# Patient Record
Sex: MALE | Race: WHITE | NOT HISPANIC OR LATINO | Employment: OTHER | URBAN - METROPOLITAN AREA
[De-identification: names, ages, dates, MRNs, and addresses within clinical notes are randomized per-mention and may not be internally consistent; named-entity substitution may affect disease eponyms.]

---

## 2017-05-25 ENCOUNTER — HISTORICAL (OUTPATIENT)
Dept: ADMINISTRATIVE | Facility: HOSPITAL | Age: 42
End: 2017-05-25

## 2017-05-25 LAB
ALBUMIN SERPL-MCNC: 3.9 GM/DL (ref 3.4–5)
ALBUMIN/GLOB SERPL: 1 {RATIO}
ALP SERPL-CCNC: 59 UNIT/L (ref 20–120)
ALT SERPL-CCNC: 127 UNIT/L
APPEARANCE, UA: CLEAR
AST SERPL-CCNC: 83 UNIT/L
BACTERIA #/AREA URNS AUTO: ABNORMAL /[HPF]
BILIRUB SERPL-MCNC: 0.7 MG/DL
BILIRUB UR QL STRIP: NEGATIVE
BILIRUBIN DIRECT+TOT PNL SERPL-MCNC: <0.1 MG/DL
BILIRUBIN DIRECT+TOT PNL SERPL-MCNC: >0.6 MG/DL
BUN SERPL-MCNC: 13 MG/DL (ref 7–25)
CALCIUM SERPL-MCNC: 9 MG/DL (ref 8.4–10.3)
CHLORIDE SERPL-SCNC: 103 MMOL/L (ref 96–110)
CO2 SERPL-SCNC: 28 MMOL/L (ref 24–32)
COLOR UR: YELLOW
CREAT SERPL-MCNC: 0.96 MG/DL (ref 0.7–1.4)
CREAT UR-MCNC: 427.7 MG/DL
EST. AVERAGE GLUCOSE BLD GHB EST-MCNC: 217 MG/DL
GLOBULIN SER-MCNC: 3.8 GM/ML (ref 2.3–3.5)
GLUCOSE (UA): 300 MG/DL
GLUCOSE SERPL-MCNC: 230 MG/DL (ref 65–99)
HBA1C MFR BLD: 9.2 % (ref 4.7–5.6)
HGB UR QL STRIP: 0.03 MG/DL
HYALINE CASTS #/AREA URNS LPF: ABNORMAL /[LPF]
KETONES UR QL STRIP: ABNORMAL
LEUKOCYTE ESTERASE UR QL STRIP: NEGATIVE
MICROALBUMIN UR-MCNC: 22.1 MG/L (ref 0–19)
MICROALBUMIN/CREAT RATIO PNL UR: 5.2 MCG/MG CR (ref 0–29)
NITRITE UR QL STRIP: NEGATIVE
PH UR STRIP: 5.5 [PH] (ref 4.5–8)
POTASSIUM SERPL-SCNC: 4 MMOL/L (ref 3.6–5.2)
PROT SERPL-MCNC: 7.7 GM/DL (ref 6–8)
PROT UR QL STRIP: 50 MG/DL
RBC #/AREA URNS AUTO: ABNORMAL /[HPF]
SODIUM SERPL-SCNC: 137 MMOL/L (ref 135–146)
SP GR UR STRIP: 1.04 (ref 1–1.03)
SQUAMOUS #/AREA URNS LPF: ABNORMAL /[LPF]
UROBILINOGEN UR STRIP-ACNC: 2 MG/DL
WBC #/AREA URNS AUTO: ABNORMAL /HPF

## 2019-10-31 ENCOUNTER — HISTORICAL (OUTPATIENT)
Dept: ENDOSCOPY | Facility: HOSPITAL | Age: 44
End: 2019-10-31

## 2020-01-22 ENCOUNTER — HISTORICAL (OUTPATIENT)
Dept: INFUSION THERAPY | Facility: HOSPITAL | Age: 45
End: 2020-01-22

## 2020-02-05 ENCOUNTER — HISTORICAL (OUTPATIENT)
Dept: INFUSION THERAPY | Facility: HOSPITAL | Age: 45
End: 2020-02-05

## 2020-03-04 ENCOUNTER — HISTORICAL (OUTPATIENT)
Dept: INFUSION THERAPY | Facility: HOSPITAL | Age: 45
End: 2020-03-04

## 2020-04-29 ENCOUNTER — HISTORICAL (OUTPATIENT)
Dept: INFUSION THERAPY | Facility: HOSPITAL | Age: 45
End: 2020-04-29

## 2020-06-24 ENCOUNTER — HISTORICAL (OUTPATIENT)
Dept: INFUSION THERAPY | Facility: HOSPITAL | Age: 45
End: 2020-06-24

## 2020-07-22 ENCOUNTER — HISTORICAL (OUTPATIENT)
Dept: INFUSION THERAPY | Facility: HOSPITAL | Age: 45
End: 2020-07-22

## 2020-08-19 ENCOUNTER — HISTORICAL (OUTPATIENT)
Dept: INFUSION THERAPY | Facility: HOSPITAL | Age: 45
End: 2020-08-19

## 2020-08-25 ENCOUNTER — HISTORICAL (OUTPATIENT)
Dept: ADMINISTRATIVE | Facility: HOSPITAL | Age: 45
End: 2020-08-25

## 2020-08-31 ENCOUNTER — HISTORICAL (OUTPATIENT)
Dept: RADIOLOGY | Facility: HOSPITAL | Age: 45
End: 2020-08-31

## 2020-08-31 LAB
FINAL CULTURE: NORMAL
FINAL CULTURE: NORMAL

## 2020-09-16 ENCOUNTER — HISTORICAL (OUTPATIENT)
Dept: INFUSION THERAPY | Facility: HOSPITAL | Age: 45
End: 2020-09-16

## 2020-09-29 ENCOUNTER — HISTORICAL (OUTPATIENT)
Dept: ADMINISTRATIVE | Facility: HOSPITAL | Age: 45
End: 2020-09-29

## 2020-10-14 ENCOUNTER — HISTORICAL (OUTPATIENT)
Dept: INFUSION THERAPY | Facility: HOSPITAL | Age: 45
End: 2020-10-14

## 2020-11-12 ENCOUNTER — HISTORICAL (OUTPATIENT)
Dept: INFUSION THERAPY | Facility: HOSPITAL | Age: 45
End: 2020-11-12

## 2020-12-09 ENCOUNTER — HISTORICAL (OUTPATIENT)
Dept: INFUSION THERAPY | Facility: HOSPITAL | Age: 45
End: 2020-12-09

## 2021-01-06 ENCOUNTER — HISTORICAL (OUTPATIENT)
Dept: INFUSION THERAPY | Facility: HOSPITAL | Age: 46
End: 2021-01-06

## 2021-02-03 ENCOUNTER — HISTORICAL (OUTPATIENT)
Dept: CARDIOLOGY | Facility: HOSPITAL | Age: 46
End: 2021-02-03

## 2021-02-03 ENCOUNTER — HISTORICAL (OUTPATIENT)
Dept: INFUSION THERAPY | Facility: HOSPITAL | Age: 46
End: 2021-02-03

## 2021-03-03 ENCOUNTER — HISTORICAL (OUTPATIENT)
Dept: INFUSION THERAPY | Facility: HOSPITAL | Age: 46
End: 2021-03-03

## 2021-03-31 ENCOUNTER — HISTORICAL (OUTPATIENT)
Dept: INFUSION THERAPY | Facility: HOSPITAL | Age: 46
End: 2021-03-31

## 2021-04-29 ENCOUNTER — HISTORICAL (OUTPATIENT)
Dept: INFUSION THERAPY | Facility: HOSPITAL | Age: 46
End: 2021-04-29

## 2021-05-26 ENCOUNTER — HISTORICAL (OUTPATIENT)
Dept: INFUSION THERAPY | Facility: HOSPITAL | Age: 46
End: 2021-05-26

## 2021-06-23 ENCOUNTER — HISTORICAL (OUTPATIENT)
Dept: INFUSION THERAPY | Facility: HOSPITAL | Age: 46
End: 2021-06-23

## 2021-07-16 ENCOUNTER — HISTORICAL (OUTPATIENT)
Dept: CARDIOLOGY | Facility: HOSPITAL | Age: 46
End: 2021-07-16

## 2021-07-21 ENCOUNTER — HISTORICAL (OUTPATIENT)
Dept: INFUSION THERAPY | Facility: HOSPITAL | Age: 46
End: 2021-07-21

## 2021-08-19 ENCOUNTER — HISTORICAL (OUTPATIENT)
Dept: ADMINISTRATIVE | Facility: HOSPITAL | Age: 46
End: 2021-08-19

## 2021-08-20 ENCOUNTER — HISTORICAL (OUTPATIENT)
Dept: INFUSION THERAPY | Facility: HOSPITAL | Age: 46
End: 2021-08-20

## 2021-08-21 LAB — FINAL CULTURE: NORMAL

## 2021-09-15 ENCOUNTER — HISTORICAL (OUTPATIENT)
Dept: INFUSION THERAPY | Facility: HOSPITAL | Age: 46
End: 2021-09-15

## 2021-10-13 ENCOUNTER — HISTORICAL (OUTPATIENT)
Dept: INFUSION THERAPY | Facility: HOSPITAL | Age: 46
End: 2021-10-13

## 2021-11-09 ENCOUNTER — HISTORICAL (OUTPATIENT)
Dept: INFUSION THERAPY | Facility: HOSPITAL | Age: 46
End: 2021-11-09

## 2021-12-08 ENCOUNTER — HISTORICAL (OUTPATIENT)
Dept: INFUSION THERAPY | Facility: HOSPITAL | Age: 46
End: 2021-12-08

## 2022-01-06 ENCOUNTER — HISTORICAL (OUTPATIENT)
Dept: INFUSION THERAPY | Facility: HOSPITAL | Age: 47
End: 2022-01-06

## 2022-02-07 ENCOUNTER — HISTORICAL (OUTPATIENT)
Dept: INFUSION THERAPY | Facility: HOSPITAL | Age: 47
End: 2022-02-07

## 2022-03-07 ENCOUNTER — HISTORICAL (OUTPATIENT)
Dept: INFUSION THERAPY | Facility: HOSPITAL | Age: 47
End: 2022-03-07

## 2022-03-15 ENCOUNTER — HISTORICAL (OUTPATIENT)
Dept: INFUSION THERAPY | Facility: HOSPITAL | Age: 47
End: 2022-03-15

## 2022-04-06 ENCOUNTER — HISTORICAL (OUTPATIENT)
Dept: ADMINISTRATIVE | Facility: HOSPITAL | Age: 47
End: 2022-04-06

## 2022-04-09 ENCOUNTER — HISTORICAL (OUTPATIENT)
Dept: ADMINISTRATIVE | Facility: HOSPITAL | Age: 47
End: 2022-04-09
Payer: MEDICARE

## 2022-04-11 ENCOUNTER — HISTORICAL (OUTPATIENT)
Dept: INFUSION THERAPY | Facility: HOSPITAL | Age: 47
End: 2022-04-11

## 2022-04-21 ENCOUNTER — HISTORICAL (OUTPATIENT)
Dept: ADMINISTRATIVE | Facility: HOSPITAL | Age: 47
End: 2022-04-21
Payer: MEDICARE

## 2022-04-25 VITALS
WEIGHT: 270.75 LBS | BODY MASS INDEX: 37.9 KG/M2 | SYSTOLIC BLOOD PRESSURE: 145 MMHG | HEIGHT: 71 IN | DIASTOLIC BLOOD PRESSURE: 83 MMHG | OXYGEN SATURATION: 97 %

## 2022-05-02 NOTE — HISTORICAL [facility] CERNER
This is a historical note converted from [employer]. Formatting and pictures may have been removed.  Please reference [employer] for original formatting and attached multimedia. Chief Complaint  check up - rx refills  History of Present Illness  41 year old  male here today for F/U. DM last A1c not at goal 8.4 previously at goal pt was walking alot then stopped. HTN at goal. Ulcerative colitis pt seen in gastro. Pt has no complaints today.  Review of Systems  All Systems Negative Except: See HPI  Physical Exam  Vitals & Measurements  T:?37? ?C ?(Oral)? HR:?69?(Peripheral)? RR:?18? BP:?127/83? HT:?177?cm? HT:?177?cm? HT:?177?cm? WT:?119?kg? WT:?119?kg? WT:?119?kg? BMI:?37.98?  General:? Alert and oriented, No acute distress.?  Eye:? Pupils are equal, round and reactive to light, Normal conjunctiva.?  HENT:? Normocephalic, Normal hearing, Oral mucosa is moist, No pharyngeal erythema.?  Neck:? Supple, Non-tender, No carotid bruit, No jugular venous distention, No lymphadenopathy, No thyromegaly.?  Respiratory:? Lungs are clear to auscultation, Respirations are non-labored, Breath sounds are equal, Symmetrical chest wall expansion.?  Cardiovascular:? Normal rate, Regular rhythm, No murmur, Good pulses equal in all extremities, Normal peripheral perfusion, No edema.?  Gastrointestinal:? Soft, Non-tender, Non-distended, Normal bowel sounds, No organomegaly.?Colostomy bag  Musculoskeletal: Normal range of motion, Normal strength, No tenderness, No deformity, Normal gait.?  Integumentary:? Warm, Dry.?  Neurologic:? Alert, Oriented.?  Psychiatric:? Cooperative, Appropriate mood & affect.?  ?  ?  Assessment/Plan  Alopecia areata  Seen in Dermatology  Ordered:  Clinic Follow up, *Est. 08/25/17 3:00:00 CDT, F/U 3 month, Order for future visit, Alopecia areata  Diabetes  Hypertension  Ulcerative colitis, [facility]  ?  Diabetes  A1c today  Last labs 8.4  Continue meds  Ordered:  Clinic Follow up, *Est. 08/25/17 3:00:00  CDT, F/U 3 month, Order for future visit, Alopecia areata  Diabetes  Hypertension  Ulcerative colitis, [facility]  Comprehensive Metabolic Panel, Routine collect, *Est. 05/25/17 3:00:00 CDT, Blood, Order for future visit, *Est. Stop date 05/25/17 3:00:00 CDT, Lab Collect, Diabetes, On Exactly, 05/25/17 7:39:00 CDT  Hemoglobin A1C [facility], Routine collect, *Est. 05/25/17 3:00:00 CDT, Blood, Order for future visit, *Est. Stop date 05/25/17 3:00:00 CDT, Lab Collect, Diabetes, On Exactly, 05/25/17 7:39:00 CDT  Microalbumin Level Urine, Routine collect, Urine, Order for future visit, *Est. 05/25/17 3:00:00 CDT, *Est. Stop date 05/25/17 3:00:00 CDT, Nurse collect, Diabetes, On Exactly, 05/25/17 7:39:00 CDT  Urinalysis with Microscopic if Indicated, Routine collect, Urine, Order for future visit, *Est. 05/25/17 3:00:00 CDT, *Est. Stop date 05/25/17 3:00:00 CDT, Nurse collect, Diabetes, On Exactly, 05/25/17 7:39:00 CDT  ?  Hypertension  ?At goal  ?Low sodium diet  Ordered:  Clinic Follow up, *Est. 08/25/17 3:00:00 CDT, F/U 3 month, Order for future visit, Alopecia areata  Diabetes  Hypertension  Ulcerative colitis, [facility]  ?  Ulcerative colitis  ?Keep F/U with gatro  Ordered:  Clinic Follow up, *Est. 08/25/17 3:00:00 CDT, F/U 3 month, Order for future visit, Alopecia areata  Diabetes  Hypertension  Ulcerative colitis, [facility]  ?   Problem List/Past Medical History  Colitis, ulcerative  Depression  Diabetes mellitus  Hypertension  Ileostomy - stoma  Obesity(  Probable Diagnosis  )  Ulcerative colitis  Historical  No historical problems  Procedure/Surgical History  Esophagogastroduodenoscopy (08/21/2015), Other endoscopy of small intestine (08/21/2015), Small intestinal endoscopy, enteroscopy beyond second portion of duodenum, including ileum; diagnostic, with or without collection of specimen(s) by brushing or washing (separate procedure) (08/21/2015), Colon resection, iliostomy, removal of large  colon.  Medications  Bentyl 20 mg oral tablet, 20 mg, 1 tab(s), Oral, QID, PRN, 3 refills  fluticasone 50 mcg/inh nasal spray, 2 spray(s), Nasal, BID, 5 refills  FLUTICASONE PROP 0.05% CREAM  glipiZIDE 10 mg oral tablet, 10 mg, 1 tab(s), Oral, Daily, 3 refills  lancets and strips for glucomter, See Instructions, 3 refills  Lidocaine Viscous 2% mucous membrane solution, 15 mL, N/A, Once  lisinopril 20 mg oral tablet, 20 mg, 1 tab(s), Oral, Daily, 3 refills  metFORMIN 1000 mg oral tablet, 1000 mg, 1 tab(s), Oral, BID, 3 refills  mometasone 0.1% topical ointment, 1 nat, TOP, Daily, 4 refills  multivitamin with minerals (Adult Tab), 1 tab(s), Oral, Daily  Pantoprazole 40 mg ORAL EC-Tablet, 40 mg, 1 tab(s), Oral, Daily, 3 refills  Vitamin B Complex oral tablet, 1 tab(s), Oral, Daily  Zantac 150 oral tablet, 150 mg, 1 tab(s), Oral, Once a day (at bedtime), 3 refills  Allergies  Bee Stings  No Known Medication Allergies  Social History  Alcohol  Past  Employment/School  Disabled, Highest education level: High school.  Exercise - Does not exercise  Home/Environment  Lives with Children, Spouse. Living situation: Home/Independent. Home equipment: Glucose monitoring. Alcohol abuse in household: No. Substance abuse in household: No. Smoker in household: No. Injuries/Abuse/Neglect in household: No. Feels unsafe at home: No. Safe place to go: Yes. Family/Friends available for support: Yes. Concern for family members at home: No. Major illness in household: No. Uatsdin restrictions/concerns: Blood products. Risks in environment: Pets/Animal exposure, Pool/[place]ake, Smoke/CO detectors absent.  Nutrition/Health  Type of diet: LOW RESIDUE. Diabetic, Caffeine intake amount: 1 TO 2 CUPS OF COFFEE 12 TO 20 OZ OF SODA A DAY. Wants to lose weight: Yes. Sleeping concerns: Yes. Feels highly stressed: Yes.  Substance Abuse  Never  Tobacco  Never smoker  Family History  Breast cancer: Grandmother.  Diabetes mellitus type 2: Mother and  Brother.  Heart disease: Mother.  Hyperlipidemia.: Mother.  Hypertension.: Sister and Brother.

## 2022-05-03 DIAGNOSIS — E11.9 TYPE 2 DIABETES MELLITUS WITHOUT COMPLICATION, UNSPECIFIED WHETHER LONG TERM INSULIN USE: Primary | ICD-10-CM

## 2022-05-04 DIAGNOSIS — R79.89 LOW TESTOSTERONE: Primary | ICD-10-CM

## 2022-05-04 RX ORDER — TESTOSTERONE CYPIONATE 200 MG/ML
200 INJECTION, SOLUTION INTRAMUSCULAR
Qty: 2 ML | Refills: 0 | Status: SHIPPED | OUTPATIENT
Start: 2022-05-04 | End: 2022-06-01

## 2022-05-06 DIAGNOSIS — K50.919 CROHN'S DISEASE WITH COMPLICATION, UNSPECIFIED GASTROINTESTINAL TRACT LOCATION: ICD-10-CM

## 2022-05-06 PROBLEM — K50.90 CROHN DISEASE: Status: ACTIVE | Noted: 2022-05-06

## 2022-05-06 RX ORDER — EPINEPHRINE 1 MG/ML
0.3 INJECTION, SOLUTION, CONCENTRATE INTRAVENOUS
Status: CANCELLED | OUTPATIENT
Start: 2022-05-09

## 2022-05-06 RX ORDER — DIPHENHYDRAMINE HYDROCHLORIDE 50 MG/ML
25 INJECTION INTRAMUSCULAR; INTRAVENOUS
Status: CANCELLED | OUTPATIENT
Start: 2022-05-09

## 2022-05-06 RX ORDER — IPRATROPIUM BROMIDE AND ALBUTEROL SULFATE 2.5; .5 MG/3ML; MG/3ML
3 SOLUTION RESPIRATORY (INHALATION)
Status: CANCELLED | OUTPATIENT
Start: 2022-05-09

## 2022-05-06 RX ORDER — METHYLPREDNISOLONE SOD SUCC 125 MG
40 VIAL (EA) INJECTION
Status: CANCELLED | OUTPATIENT
Start: 2022-05-09

## 2022-05-06 RX ORDER — HEPARIN 100 UNIT/ML
500 SYRINGE INTRAVENOUS
Status: CANCELLED | OUTPATIENT
Start: 2022-05-09

## 2022-05-06 RX ORDER — ACETAMINOPHEN 325 MG/1
650 TABLET ORAL
Status: CANCELLED | OUTPATIENT
Start: 2022-05-09

## 2022-05-06 RX ORDER — SODIUM CHLORIDE 0.9 % (FLUSH) 0.9 %
10 SYRINGE (ML) INJECTION
Status: CANCELLED | OUTPATIENT
Start: 2022-05-09

## 2022-05-09 ENCOUNTER — INFUSION (OUTPATIENT)
Dept: INFUSION THERAPY | Facility: HOSPITAL | Age: 47
End: 2022-05-09
Attending: STUDENT IN AN ORGANIZED HEALTH CARE EDUCATION/TRAINING PROGRAM
Payer: MEDICARE

## 2022-05-09 VITALS
DIASTOLIC BLOOD PRESSURE: 80 MMHG | BODY MASS INDEX: 41.31 KG/M2 | SYSTOLIC BLOOD PRESSURE: 145 MMHG | WEIGHT: 288.56 LBS | TEMPERATURE: 98 F | HEART RATE: 93 BPM | HEIGHT: 70 IN

## 2022-05-09 DIAGNOSIS — K50.919 CROHN'S DISEASE WITH COMPLICATION, UNSPECIFIED GASTROINTESTINAL TRACT LOCATION: Primary | ICD-10-CM

## 2022-05-09 PROCEDURE — 63600175 PHARM REV CODE 636 W HCPCS: Mod: JG | Performed by: INTERNAL MEDICINE

## 2022-05-09 PROCEDURE — 96365 THER/PROPH/DIAG IV INF INIT: CPT

## 2022-05-09 PROCEDURE — 96360 HYDRATION IV INFUSION INIT: CPT

## 2022-05-09 PROCEDURE — 25000003 PHARM REV CODE 250: Performed by: INTERNAL MEDICINE

## 2022-05-09 RX ORDER — SODIUM CHLORIDE 0.9 % (FLUSH) 0.9 %
10 SYRINGE (ML) INJECTION
Status: CANCELLED | OUTPATIENT
Start: 2022-06-06

## 2022-05-09 RX ORDER — METHYLPREDNISOLONE SOD SUCC 125 MG
40 VIAL (EA) INJECTION
Status: CANCELLED | OUTPATIENT
Start: 2022-06-06

## 2022-05-09 RX ORDER — EPINEPHRINE 1 MG/ML
0.3 INJECTION, SOLUTION, CONCENTRATE INTRAVENOUS
Status: CANCELLED | OUTPATIENT
Start: 2022-06-06

## 2022-05-09 RX ORDER — ACETAMINOPHEN 325 MG/1
650 TABLET ORAL
Status: CANCELLED | OUTPATIENT
Start: 2022-06-06

## 2022-05-09 RX ORDER — EPINEPHRINE 1 MG/ML
0.3 INJECTION, SOLUTION, CONCENTRATE INTRAVENOUS
Status: ACTIVE | OUTPATIENT
Start: 2022-05-09 | End: 2022-05-09

## 2022-05-09 RX ORDER — DIPHENHYDRAMINE HYDROCHLORIDE 50 MG/ML
25 INJECTION INTRAMUSCULAR; INTRAVENOUS
Status: CANCELLED | OUTPATIENT
Start: 2022-06-06

## 2022-05-09 RX ORDER — ACETAMINOPHEN 325 MG/1
650 TABLET ORAL
Status: ACTIVE | OUTPATIENT
Start: 2022-05-09 | End: 2022-05-09

## 2022-05-09 RX ORDER — SODIUM CHLORIDE 0.9 % (FLUSH) 0.9 %
10 SYRINGE (ML) INJECTION
Status: DISCONTINUED | OUTPATIENT
Start: 2022-05-09 | End: 2022-05-09 | Stop reason: HOSPADM

## 2022-05-09 RX ORDER — HEPARIN 100 UNIT/ML
500 SYRINGE INTRAVENOUS
Status: DISCONTINUED | OUTPATIENT
Start: 2022-05-09 | End: 2022-05-09 | Stop reason: HOSPADM

## 2022-05-09 RX ORDER — DIPHENHYDRAMINE HYDROCHLORIDE 50 MG/ML
25 INJECTION INTRAMUSCULAR; INTRAVENOUS
Status: ACTIVE | OUTPATIENT
Start: 2022-05-09 | End: 2022-05-09

## 2022-05-09 RX ORDER — IPRATROPIUM BROMIDE AND ALBUTEROL SULFATE 2.5; .5 MG/3ML; MG/3ML
3 SOLUTION RESPIRATORY (INHALATION)
Status: ACTIVE | OUTPATIENT
Start: 2022-05-09 | End: 2022-05-09

## 2022-05-09 RX ORDER — HEPARIN 100 UNIT/ML
500 SYRINGE INTRAVENOUS
Status: CANCELLED | OUTPATIENT
Start: 2022-06-06

## 2022-05-09 RX ORDER — IPRATROPIUM BROMIDE AND ALBUTEROL SULFATE 2.5; .5 MG/3ML; MG/3ML
3 SOLUTION RESPIRATORY (INHALATION)
Status: CANCELLED | OUTPATIENT
Start: 2022-06-06

## 2022-05-09 RX ADMIN — VEDOLIZUMAB 300 MG: 300 INJECTION, POWDER, LYOPHILIZED, FOR SOLUTION INTRAVENOUS at 01:05

## 2022-05-24 ENCOUNTER — NUTRITION (OUTPATIENT)
Dept: DIABETES | Facility: CLINIC | Age: 47
End: 2022-05-24
Payer: MEDICARE

## 2022-05-24 DIAGNOSIS — E11.9 TYPE 2 DIABETES MELLITUS WITHOUT COMPLICATION, UNSPECIFIED WHETHER LONG TERM INSULIN USE: ICD-10-CM

## 2022-05-24 PROCEDURE — G0108 DIAB MANAGE TRN  PER INDIV: HCPCS | Mod: PBBFAC | Performed by: DIETITIAN, REGISTERED

## 2022-05-24 PROCEDURE — 99212 OFFICE O/P EST SF 10 MIN: CPT | Mod: PBBFAC | Performed by: DIETITIAN, REGISTERED

## 2022-05-24 NOTE — PROGRESS NOTES
Diabetes Care Specialist Progress Note  Author: Trina Orellana RD  Date: 5/24/2022    Program Intake  Reason for Diabetes Program Visit:: Initial Diabetes Assessment  Current diabetes risk level::  (A1C unavailable)    Lab Results   Component Value Date    HGBA1C 9.2 (H) 05/25/2017       Clinical         Problem Review  Active comorbidities affecting diabetes self-care.: yes  Comorbidities: Other (comment) (Chron's disease)  Reviewed health maintenance: no (see comments) (reviewed in initial visit)    Clinical Assessment  Current Diabetes Treatment: Oral Medication, Injectable  Have you ever experienced hypoglycemia (low blood sugar)?: yes  In the last month, how often have you experienced low blood sugar?: once every other week  Are you able to tell when your blood sugar is low?: Yes  What symptoms do you experience?: Dizzy/Light-headed, Sweaty, Shaky  How do you treat hypoglycemia (low blood sugar)?: 4 glucose tablets  Have you ever experienced hyperglycemia (high blood sugar)?: yes  In the last month, how often have you experienced high blood sugar?: once every other week  Are you able to tell when your blood sugar is high?: Yes  What are your symptoms?: blurry vision    Medication Information  How do you obtain your medications?: Patient drives  How many days a week do you miss your medications?: Never  Do you sometimes have difficulty refilling your medications?: No  Medication adherence impacting ability to self-manage diabetes?: No    Labs  Do you have regular lab work to monitor your medications?: Yes  Type of Regular Lab Work: CBC  Lab Compliance Barriers: No    Nutritional Status  Diet: Diabetic diet  Meal Plan 24 Hour Recall: Breakfast, Lunch, Dinner  Meal Plan 24 Hour Recall - Breakfast: coffee with cream and sugar or 1/2 cup juice  Meal Plan 24 Hour Recall - Lunch: slim fast or glucerna  Meal Plan 24 Hour Recall - Dinner: meat sauce w/out spaghetti  Change in appetite?: Yes  Recent Changes in Weight:  Weight Loss (UBW - 266# in December 2021)  Was weight loss intentional or unintentional?: Unintentional  Current nutritional status an area of need that is impacting patient's ability to self-manage diabetes?: No    Additional Social History    Support  Does anyone support you with your diabetes care?: yes  Who supports you?: son/daughter, friend, spouse  Who takes you to your medical appointments?: self  Does the current support meet the patient's needs?: Yes  Is Support an area impacting ability to self-manage diabetes?: No    Access to Mass Media & Technology  Does the patient have access to any of the following devices or technologies?: Internet Access, Smart phone  Media or technology needs impacting ability to self-manage diabetes?: No    Cognitive/Behavioral Health  Alert and Oriented: Yes  Difficulty Thinking: No  Requires Prompting: No  Requires assistance for routine expression?: No  Cognitive or behavioral barriers impacting ability to self-manage diabetes?: No    Culture/Oriental orthodox  Culture or Baptist beliefs that may impact ability to access healthcare: No    Communication  Language preference: English  Hearing Problems: No  Vision Problems: Yes  Vision problem type:: Decreased Vision  Vision Assistance: Glasses    Health Literacy  Preferred Learning Method: Face to Face, Reading Materials, Hands On, Demonstration  How often do you need to have someone help you read instructions, pamphlets, or written material from your doctor or pharmacy?: Never  Health literacy needs impacting ability to self-manage diabetes?: No      Diabetes Self-Management Skills Assessment    Diabetes Disease Process/Treatment Options  Patient/caregiver able to state what happens when someone has diabetes.: yes  Patient/caregiver knows what type of diabetes they have.: yes  Diabetes Type : Type II  Patient/caregiver able to identify at least three signs and symptoms of diabetes.: yes  Identified signs and symptoms:: blurred  vision, frequent urination, fatigue  Diabetes Disease Process/Treatment Options: Skills Assessment Completed: No (completed on initial visit)  Area of need?: No    Nutrition/Healthy Eating  Challenges to healthy eating:: other (see comments) (binges on occassion)  Patient can identify foods that impact blood sugar.: yes  Patient-identified foods:: fruit/fruit juice, starchy vegetables (corn, peas, beans), starches (bread, pasta, rice, cereal), soda, sweets  Nutrition/Healthy Eating Skills Assessment Completed:: Yes  Assessment indicates:: Adequate understanding  Area of need?: No    Physical Activity/Exercise  Patient's daily activity level:: lightly active  Patient formally exercises outside of work.: no  Reasons for not exercising:: physically unable to exercise currently  Physical Activity/Exercise Skills Assessment Completed: : Yes  Assessment indicates:: Adequate understanding  Area of need?: No    Medications  Patient is able to describe current diabetes management routine.: yes  Diabetes management routine:: diet, injectable medications, oral medications  Patient is able to identify current diabetes medications, dosages, and appropriate timing of medications.: yes (Trulicity weekly, Janumet BID)  Patient understands the purpose of the medications taken for diabetes.: yes  Patient reports problems or concerns with current medication regimen.: no  Medication Skills Assessment Completed:: Yes  Assessment indicates:: Adequate understanding  Area of need?: No    Home Blood Glucose Monitoring  Patient states that blood sugar is checked at home daily.: no  Reasons for not monitoring:: finger pain  Home Blood Glucose Monitoring Skills Assessment Completed: : Yes  Assessment indicates:: Adequate understanding  Area of need?: No    Acute Complications  Patient is able to identify types of acute complications: Yes  Patient Identified:: Hypoglycemia  Patient is able to state the basic meaning of hypoglycemia?: Yes  Able  to state the blood sugar range for hypoglycemia?: yes  Patient stated range:: <70  Patient can identify general symptoms of hypoglycemia: yes  Patient identified:: dizziness, sweating  Able to state proper treatment of hypoglycemia?: yes  Patient identified:: 4 glucose tablets  Acute Complications Skills Assessment Completed: : Yes  Assessment indicates:: Adequate understanding  Area of need?: No    Chronic Complications  Chronic Complications Skills Assessment Completed: : No (completed at initial visit)  Area of need?: No    Psychosocial/Coping  Patient can identify ways of coping with chronic disease.: yes  Patient-stated ways of coping with chronic disease:: support from loved ones  Psychosocial/Coping Skills Assessment Completed: : Yes  Assessment indicates:: Adequate understanding  Area of need?: No      Diabetes Self Support Plan         Assessment Summary and Plan    Based on today's diabetes care assessment, the following areas of need were identified:      Social 5/24/2022   Support No   Access to Mass Media/Tech No   Cognitive/Behavioral Health No   Culture/[place] No   Health Literacy No        Clinical 5/24/2022   Medication Adherence No   Lab Compliance No   Nutritional Status No        Diabetes Self-Management Skills 5/24/2022   Diabetes Disease Process/Treatment Options No   Nutrition/Healthy Eating No   Physical Activity/Exercise No   Medication No   Home Blood Glucose Monitoring No   Acute Complications No   Chronic Complications No   Psychosocial/Coping No          Today's interventions were provided through individual discussion, instruction, and written materials were provided.      Patient verbalized understanding of instruction and written materials.  Pt was able to return back demonstration of instructions today. Patient understood key points, needs reinforcement and further instruction.     Diabetes Self-Management Care Plan:    Today's Diabetes Self-Management Care Plan was developed with  Sheila's input. Sheila has agreed to work toward the following goal(s) to improve his/her overall diabetes control.      There are no recently modified care plans to display for this patient.      Follow Up Plan     Follow up if symptoms worsen or fail to improve. Pt declines follow-up with diabetes ed and will reach out to diabetes education, as needed.    Today's care plan and follow up schedule was discussed with patient.  Sheila verbalized understanding of the care plan, goals, and agrees to follow up plan.        The patient was encouraged to communicate with his/her health care provider/physician and care team regarding his/her condition(s) and treatment.  I provided the patient with my contact information today and encouraged to contact me via phone or [facility] Patient Portal as needed.     Length of Visit   Total Time: 30 Minutes

## 2022-06-06 ENCOUNTER — INFUSION (OUTPATIENT)
Dept: INFUSION THERAPY | Facility: HOSPITAL | Age: 47
End: 2022-06-06
Attending: INTERNAL MEDICINE
Payer: MEDICARE

## 2022-06-06 VITALS — DIASTOLIC BLOOD PRESSURE: 76 MMHG | HEART RATE: 85 BPM | SYSTOLIC BLOOD PRESSURE: 123 MMHG | TEMPERATURE: 99 F

## 2022-06-06 DIAGNOSIS — K50.919 CROHN'S DISEASE WITH COMPLICATION, UNSPECIFIED GASTROINTESTINAL TRACT LOCATION: Primary | ICD-10-CM

## 2022-06-06 PROCEDURE — 96365 THER/PROPH/DIAG IV INF INIT: CPT

## 2022-06-06 PROCEDURE — 63600175 PHARM REV CODE 636 W HCPCS: Mod: JG | Performed by: INTERNAL MEDICINE

## 2022-06-06 PROCEDURE — 25000003 PHARM REV CODE 250: Performed by: INTERNAL MEDICINE

## 2022-06-06 RX ORDER — IPRATROPIUM BROMIDE AND ALBUTEROL SULFATE 2.5; .5 MG/3ML; MG/3ML
3 SOLUTION RESPIRATORY (INHALATION)
Status: DISCONTINUED | OUTPATIENT
Start: 2022-06-06 | End: 2022-06-06

## 2022-06-06 RX ORDER — HEPARIN 100 UNIT/ML
500 SYRINGE INTRAVENOUS
Status: CANCELLED | OUTPATIENT
Start: 2022-07-04

## 2022-06-06 RX ORDER — SODIUM CHLORIDE 0.9 % (FLUSH) 0.9 %
10 SYRINGE (ML) INJECTION
Status: CANCELLED | OUTPATIENT
Start: 2022-07-04

## 2022-06-06 RX ORDER — EPINEPHRINE 0.3 MG/.3ML
0.3 INJECTION SUBCUTANEOUS
Status: ACTIVE | OUTPATIENT
Start: 2022-06-06 | End: 2022-06-06

## 2022-06-06 RX ORDER — METHYLPREDNISOLONE SOD SUCC 125 MG
40 VIAL (EA) INJECTION
Status: CANCELLED | OUTPATIENT
Start: 2022-07-04

## 2022-06-06 RX ORDER — ACETAMINOPHEN 325 MG/1
650 TABLET ORAL
Status: CANCELLED | OUTPATIENT
Start: 2022-07-04

## 2022-06-06 RX ORDER — ACETAMINOPHEN 325 MG/1
650 TABLET ORAL
Status: ACTIVE | OUTPATIENT
Start: 2022-06-06 | End: 2022-06-06

## 2022-06-06 RX ORDER — EPINEPHRINE 1 MG/ML
0.3 INJECTION, SOLUTION, CONCENTRATE INTRAVENOUS
Status: CANCELLED | OUTPATIENT
Start: 2022-07-04

## 2022-06-06 RX ORDER — IPRATROPIUM BROMIDE AND ALBUTEROL SULFATE 2.5; .5 MG/3ML; MG/3ML
3 SOLUTION RESPIRATORY (INHALATION)
Status: CANCELLED | OUTPATIENT
Start: 2022-07-04

## 2022-06-06 RX ORDER — DIPHENHYDRAMINE HYDROCHLORIDE 50 MG/ML
25 INJECTION INTRAMUSCULAR; INTRAVENOUS
Status: CANCELLED | OUTPATIENT
Start: 2022-07-04

## 2022-06-06 RX ORDER — DIPHENHYDRAMINE HYDROCHLORIDE 50 MG/ML
25 INJECTION INTRAMUSCULAR; INTRAVENOUS
Status: ACTIVE | OUTPATIENT
Start: 2022-06-06 | End: 2022-06-06

## 2022-06-06 RX ADMIN — VEDOLIZUMAB 300 MG: 300 INJECTION, POWDER, LYOPHILIZED, FOR SOLUTION INTRAVENOUS at 12:06

## 2022-06-08 ENCOUNTER — TELEPHONE (OUTPATIENT)
Dept: FAMILY MEDICINE | Facility: CLINIC | Age: 47
End: 2022-06-08

## 2022-06-13 RX ORDER — DULAGLUTIDE 0.75 MG/.5ML
INJECTION, SOLUTION SUBCUTANEOUS
COMMUNITY
Start: 2022-06-04 | End: 2022-06-13 | Stop reason: SDUPTHER

## 2022-06-13 RX ORDER — TESTOSTERONE CYPIONATE 200 MG/ML
100 INJECTION, SOLUTION INTRAMUSCULAR
COMMUNITY
End: 2022-06-13 | Stop reason: SDUPTHER

## 2022-06-13 RX ORDER — TESTOSTERONE CYPIONATE 200 MG/ML
100 INJECTION, SOLUTION INTRAMUSCULAR
Qty: 1 ML | Refills: 3 | OUTPATIENT
Start: 2022-06-13

## 2022-06-13 RX ORDER — DULAGLUTIDE 0.75 MG/.5ML
0.75 INJECTION, SOLUTION SUBCUTANEOUS
Qty: 4 PEN | Refills: 3 | Status: SHIPPED | OUTPATIENT
Start: 2022-06-13 | End: 2022-11-09

## 2022-06-14 RX ORDER — TESTOSTERONE CYPIONATE 200 MG/ML
100 INJECTION, SOLUTION INTRAMUSCULAR
Qty: 4 ML | Refills: 4 | Status: SHIPPED | OUTPATIENT
Start: 2022-06-14 | End: 2022-06-20 | Stop reason: SDUPTHER

## 2022-06-16 ENCOUNTER — TELEPHONE (OUTPATIENT)
Dept: FAMILY MEDICINE | Facility: CLINIC | Age: 47
End: 2022-06-16
Payer: MEDICARE

## 2022-06-16 NOTE — TELEPHONE ENCOUNTER
"Spoke with [facility]" will resend for refill Testosterone Cypionate .  "

## 2022-06-20 DIAGNOSIS — R79.89 LOW TESTOSTERONE: Primary | ICD-10-CM

## 2022-06-20 RX ORDER — TESTOSTERONE CYPIONATE 200 MG/ML
100 INJECTION, SOLUTION INTRAMUSCULAR
Qty: 4 ML | Refills: 4 | Status: SHIPPED | OUTPATIENT
Start: 2022-06-20 | End: 2022-06-30 | Stop reason: SDUPTHER

## 2022-06-22 ENCOUNTER — TELEPHONE (OUTPATIENT)
Dept: FAMILY MEDICINE | Facility: CLINIC | Age: 47
End: 2022-06-22

## 2022-06-29 DIAGNOSIS — Z79.4 TYPE 2 DIABETES MELLITUS WITHOUT COMPLICATION, WITH LONG-TERM CURRENT USE OF INSULIN: Primary | ICD-10-CM

## 2022-06-29 DIAGNOSIS — E11.9 TYPE 2 DIABETES MELLITUS WITHOUT COMPLICATION, WITH LONG-TERM CURRENT USE OF INSULIN: Primary | ICD-10-CM

## 2022-06-29 RX ORDER — DULAGLUTIDE 0.75 MG/.5ML
0.75 INJECTION, SOLUTION SUBCUTANEOUS
Qty: 12 PEN | Refills: 3 | Status: SHIPPED | OUTPATIENT
Start: 2022-06-29 | End: 2022-11-09

## 2022-06-30 ENCOUNTER — TELEPHONE (OUTPATIENT)
Dept: FAMILY MEDICINE | Facility: CLINIC | Age: 47
End: 2022-06-30

## 2022-06-30 DIAGNOSIS — R79.89 LOW TESTOSTERONE: ICD-10-CM

## 2022-06-30 RX ORDER — TESTOSTERONE CYPIONATE 200 MG/ML
100 INJECTION, SOLUTION INTRAMUSCULAR
Qty: 4 ML | Refills: 4 | Status: SHIPPED | OUTPATIENT
Start: 2022-06-30 | End: 2023-09-29

## 2022-07-25 ENCOUNTER — OFFICE VISIT (OUTPATIENT)
Dept: FAMILY MEDICINE | Facility: CLINIC | Age: 47
End: 2022-07-25
Payer: MEDICARE

## 2022-07-25 VITALS
WEIGHT: 219.38 LBS | HEART RATE: 93 BPM | TEMPERATURE: 98 F | DIASTOLIC BLOOD PRESSURE: 76 MMHG | SYSTOLIC BLOOD PRESSURE: 109 MMHG | BODY MASS INDEX: 31.47 KG/M2 | OXYGEN SATURATION: 100 %

## 2022-07-25 DIAGNOSIS — Z79.4 TYPE 2 DIABETES MELLITUS WITHOUT COMPLICATION, WITH LONG-TERM CURRENT USE OF INSULIN: ICD-10-CM

## 2022-07-25 DIAGNOSIS — R79.89 LOW TESTOSTERONE: Primary | ICD-10-CM

## 2022-07-25 DIAGNOSIS — E11.9 TYPE 2 DIABETES MELLITUS WITHOUT COMPLICATION, WITHOUT LONG-TERM CURRENT USE OF INSULIN: ICD-10-CM

## 2022-07-25 DIAGNOSIS — E03.4 HYPOTHYROIDISM DUE TO ACQUIRED ATROPHY OF THYROID: ICD-10-CM

## 2022-07-25 DIAGNOSIS — E11.9 TYPE 2 DIABETES MELLITUS WITHOUT COMPLICATION, WITH LONG-TERM CURRENT USE OF INSULIN: ICD-10-CM

## 2022-07-25 DIAGNOSIS — E03.9 HYPOTHYROIDISM, UNSPECIFIED TYPE: ICD-10-CM

## 2022-07-25 LAB
EST. AVERAGE GLUCOSE BLD GHB EST-MCNC: 125.5 MG/DL
HBA1C MFR BLD: 6 %
T4 FREE SERPL-MCNC: 1.16 NG/DL (ref 0.7–1.48)
TSH SERPL-ACNC: 2.82 UIU/ML (ref 0.35–4.94)

## 2022-07-25 PROCEDURE — 36415 COLL VENOUS BLD VENIPUNCTURE: CPT | Performed by: STUDENT IN AN ORGANIZED HEALTH CARE EDUCATION/TRAINING PROGRAM

## 2022-07-25 PROCEDURE — 1159F PR MEDICATION LIST DOCUMENTED IN MEDICAL RECORD: ICD-10-PCS | Mod: CPTII,,, | Performed by: STUDENT IN AN ORGANIZED HEALTH CARE EDUCATION/TRAINING PROGRAM

## 2022-07-25 PROCEDURE — 3078F DIAST BP <80 MM HG: CPT | Mod: CPTII,,, | Performed by: STUDENT IN AN ORGANIZED HEALTH CARE EDUCATION/TRAINING PROGRAM

## 2022-07-25 PROCEDURE — 99214 OFFICE O/P EST MOD 30 MIN: CPT | Mod: S$PBB,,, | Performed by: STUDENT IN AN ORGANIZED HEALTH CARE EDUCATION/TRAINING PROGRAM

## 2022-07-25 PROCEDURE — 3008F BODY MASS INDEX DOCD: CPT | Mod: CPTII,,, | Performed by: STUDENT IN AN ORGANIZED HEALTH CARE EDUCATION/TRAINING PROGRAM

## 2022-07-25 PROCEDURE — 3074F PR MOST RECENT SYSTOLIC BLOOD PRESSURE < 130 MM HG: ICD-10-PCS | Mod: CPTII,,, | Performed by: STUDENT IN AN ORGANIZED HEALTH CARE EDUCATION/TRAINING PROGRAM

## 2022-07-25 PROCEDURE — 1159F MED LIST DOCD IN RCRD: CPT | Mod: CPTII,,, | Performed by: STUDENT IN AN ORGANIZED HEALTH CARE EDUCATION/TRAINING PROGRAM

## 2022-07-25 PROCEDURE — 99214 PR OFFICE/OUTPT VISIT, EST, LEVL IV, 30-39 MIN: ICD-10-PCS | Mod: S$PBB,,, | Performed by: STUDENT IN AN ORGANIZED HEALTH CARE EDUCATION/TRAINING PROGRAM

## 2022-07-25 PROCEDURE — 3008F PR BODY MASS INDEX (BMI) DOCUMENTED: ICD-10-PCS | Mod: CPTII,,, | Performed by: STUDENT IN AN ORGANIZED HEALTH CARE EDUCATION/TRAINING PROGRAM

## 2022-07-25 PROCEDURE — 3078F PR MOST RECENT DIASTOLIC BLOOD PRESSURE < 80 MM HG: ICD-10-PCS | Mod: CPTII,,, | Performed by: STUDENT IN AN ORGANIZED HEALTH CARE EDUCATION/TRAINING PROGRAM

## 2022-07-25 PROCEDURE — 84439 ASSAY OF FREE THYROXINE: CPT | Performed by: STUDENT IN AN ORGANIZED HEALTH CARE EDUCATION/TRAINING PROGRAM

## 2022-07-25 PROCEDURE — 83036 HEMOGLOBIN GLYCOSYLATED A1C: CPT | Performed by: STUDENT IN AN ORGANIZED HEALTH CARE EDUCATION/TRAINING PROGRAM

## 2022-07-25 PROCEDURE — 99214 OFFICE O/P EST MOD 30 MIN: CPT | Mod: PBBFAC,PN | Performed by: STUDENT IN AN ORGANIZED HEALTH CARE EDUCATION/TRAINING PROGRAM

## 2022-07-25 PROCEDURE — 3074F SYST BP LT 130 MM HG: CPT | Mod: CPTII,,, | Performed by: STUDENT IN AN ORGANIZED HEALTH CARE EDUCATION/TRAINING PROGRAM

## 2022-07-25 PROCEDURE — 84443 ASSAY THYROID STIM HORMONE: CPT | Performed by: STUDENT IN AN ORGANIZED HEALTH CARE EDUCATION/TRAINING PROGRAM

## 2022-07-25 PROCEDURE — 4010F PR ACE/ARB THEARPY RXD/TAKEN: ICD-10-PCS | Mod: CPTII,,, | Performed by: STUDENT IN AN ORGANIZED HEALTH CARE EDUCATION/TRAINING PROGRAM

## 2022-07-25 PROCEDURE — 4010F ACE/ARB THERAPY RXD/TAKEN: CPT | Mod: CPTII,,, | Performed by: STUDENT IN AN ORGANIZED HEALTH CARE EDUCATION/TRAINING PROGRAM

## 2022-07-25 RX ORDER — FUROSEMIDE 40 MG/1
40 TABLET ORAL DAILY
COMMUNITY
Start: 2022-02-17

## 2022-07-25 RX ORDER — ROSUVASTATIN CALCIUM 10 MG/1
10 TABLET, COATED ORAL
COMMUNITY
End: 2023-09-28 | Stop reason: SDUPTHER

## 2022-07-25 RX ORDER — CETIRIZINE HYDROCHLORIDE 10 MG/1
10 TABLET ORAL
COMMUNITY
Start: 2022-07-12 | End: 2023-01-08

## 2022-07-25 RX ORDER — AZELASTINE 1 MG/ML
SPRAY, METERED NASAL
COMMUNITY
Start: 2022-06-27

## 2022-07-25 RX ORDER — OXYCODONE AND ACETAMINOPHEN 10; 325 MG/1; MG/1
1 TABLET ORAL EVERY 4 HOURS PRN
COMMUNITY
Start: 2022-07-12

## 2022-07-25 RX ORDER — OXCARBAZEPINE 300 MG/1
300 TABLET, FILM COATED ORAL 2 TIMES DAILY
COMMUNITY
Start: 2022-07-10

## 2022-07-25 RX ORDER — ALBUTEROL SULFATE 90 UG/1
AEROSOL, METERED RESPIRATORY (INHALATION)
COMMUNITY
Start: 2022-07-10

## 2022-07-25 RX ORDER — HYDROCODONE BITARTRATE AND ACETAMINOPHEN 5; 325 MG/1; MG/1
1 TABLET ORAL EVERY 4 HOURS PRN
COMMUNITY
Start: 2022-07-01

## 2022-07-25 RX ORDER — METRONIDAZOLE 500 MG/1
500 TABLET ORAL
COMMUNITY
Start: 2022-07-23 | End: 2022-07-30

## 2022-07-25 RX ORDER — PROMETHAZINE HYDROCHLORIDE 25 MG/1
25 TABLET ORAL EVERY 6 HOURS PRN
COMMUNITY
Start: 2022-07-12

## 2022-07-25 RX ORDER — GLIPIZIDE 10 MG/1
10 TABLET ORAL
COMMUNITY
Start: 2021-07-16 | End: 2023-09-28

## 2022-07-25 RX ORDER — CLOPIDOGREL BISULFATE 75 MG/1
75 TABLET ORAL
COMMUNITY
Start: 2021-11-12

## 2022-07-25 RX ORDER — PANTOPRAZOLE SODIUM 40 MG/1
40 TABLET, DELAYED RELEASE ORAL
COMMUNITY
Start: 2021-11-12 | End: 2023-11-14 | Stop reason: SDUPTHER

## 2022-07-25 RX ORDER — ESCITALOPRAM OXALATE 10 MG/1
10 TABLET ORAL
COMMUNITY
Start: 2021-07-16

## 2022-07-25 RX ORDER — TESTOSTERONE CYPIONATE 200 MG/ML
100 INJECTION, SOLUTION INTRAMUSCULAR
COMMUNITY
End: 2022-10-21 | Stop reason: SDUPTHER

## 2022-07-25 RX ORDER — CIPROFLOXACIN 500 MG/1
500 TABLET ORAL
COMMUNITY
Start: 2022-07-23 | End: 2022-07-30

## 2022-07-25 RX ORDER — ONDANSETRON 4 MG/1
4 TABLET, ORALLY DISINTEGRATING ORAL EVERY 8 HOURS PRN
COMMUNITY
Start: 2022-07-12

## 2022-07-25 RX ORDER — LISINOPRIL 20 MG/1
20 TABLET ORAL
COMMUNITY
End: 2022-08-22 | Stop reason: SDUPTHER

## 2022-07-25 RX ORDER — DULAGLUTIDE 0.75 MG/.5ML
0.75 INJECTION, SOLUTION SUBCUTANEOUS
COMMUNITY
Start: 2022-06-13 | End: 2022-11-09

## 2022-07-25 RX ORDER — DICYCLOMINE HYDROCHLORIDE 20 MG/1
20 TABLET ORAL 3 TIMES DAILY
COMMUNITY
Start: 2022-04-02

## 2022-07-25 RX ORDER — LEVOTHYROXINE SODIUM 150 UG/1
150 TABLET ORAL
COMMUNITY
End: 2023-05-17

## 2022-07-25 RX ORDER — BUPROPION HYDROCHLORIDE 150 MG/1
150 TABLET, EXTENDED RELEASE ORAL 2 TIMES DAILY
COMMUNITY
Start: 2022-07-21

## 2022-07-25 RX ORDER — FLUTICASONE PROPIONATE 50 MCG
2 SPRAY, SUSPENSION (ML) NASAL
COMMUNITY

## 2022-07-25 RX ORDER — TRAZODONE HYDROCHLORIDE 100 MG/1
100 TABLET ORAL
COMMUNITY

## 2022-07-25 RX ORDER — POTASSIUM CHLORIDE 750 MG/1
20 CAPSULE, EXTENDED RELEASE ORAL
COMMUNITY

## 2022-07-25 RX ORDER — FLUCONAZOLE 150 MG/1
TABLET ORAL
COMMUNITY
Start: 2022-06-05

## 2022-07-25 RX ORDER — ARIPIPRAZOLE 10 MG/1
10 TABLET ORAL DAILY
COMMUNITY
Start: 2022-06-29

## 2022-07-25 NOTE — PROGRESS NOTES
Subjective:       Patient ID: Sheila Hamilton is a 46 y.o. male.    Chief Complaint: Follow-up    HPI   Patient was seen nurse practitioner Jalil moved away BLANCA has been signed  Patient is still receiving Testosterone injections at clinic in [place]. States that they are more convenient than for him to come here.   Patient lives in [place]  Interval history  Patient states he recently hospital stay secondary to bowel surgery and cholecystectomy    Crohn's disease  sees Dr. Trent has infusion every month at [facility] has an ileostomy  States he does not need any medical supplies at this time but his PCP was the one who ordered all of his ostomy supplies for him  Protonix 40 meq daily      Hypothyroidism  takes levothyroxine 125 mcg states he has an endocrinologist does not recall name and sees him once a year  TSH 4.168    diabetes  Reports last A1c 8.1 which was taken last week is on glipizide 10 mg twice daily  Janumet 50/1000 mg twice daily  States he was given prescription for insulin but cannot bring himself to use his needle  Is amenable to starting a weekly injection    Anxiety  Sees a psychiatrist who has prescribed diazepam 10 mg 3 times daily  Bupropion 150 mg twice daily  Trileptal 300 mg twice daily  Trazodone 50 mg at bedtime    Reactive airway disease  Uses albuterol as needed  Also uses Flonase twice daily for allergies    Hypertension  Is on lisinopril 20  States that this was recently changed however he does not know the name of the new medication until it comes in the mail  Also takes potassium chloride 20 mEq a day    History of stent placement in legs  sees [facility]  Takes clopidogrel 75 mg daily    HLD  Rosuvastatin 10 at bedtime    Low testosterone  testosterone 200 mg IN. Patient reports that he takes every week.  Long discussion as patient receiving these injections from provider in [place]. Does not manage just gives injections.  States that the drive here is too far. Has  been getting them weekly but reports missing several due to hospitalizations.     yeast infection  still present  around urethra and groin states previous physician has given him a Diflucan tablet and cream to put on area  Also reports having urologist that he sees and had recent cystoscope    HM  Will revisit once records arrive    Review of Systems      Objective:      Physical Exam    Assessment:       Problem List Items Addressed This Visit    None     Visit Diagnoses     Low testosterone    -  Primary    Relevant Orders    Testosterone    Hypothyroidism, unspecified type        Relevant Orders    T4, Free    TSH    Type 2 diabetes mellitus without complication, with long-term current use of insulin        Relevant Medications    glipiZIDE (GLUCOTROL) 10 MG tablet    SITagliptan-metformin (JANUMET) 50-1,000 mg per tablet    dulaglutide (TRULICITY) 0.75 mg/0.5 mL pen injector    Other Relevant Orders    Hemoglobin A1C    Testosterone          Plan:       Problem List Items Addressed This Visit        Endocrine    Type 2 diabetes mellitus without complication, without long-term current use of insulin    Overview     A1c today   Glipizide,  Janumet  Trulicity            Relevant Medications    glipiZIDE (GLUCOTROL) 10 MG tablet    SITagliptan-metformin (JANUMET) 50-1,000 mg per tablet    dulaglutide (TRULICITY) 0.75 mg/0.5 mL pen injector    Low testosterone - Primary    Overview     Testosterone level today            Relevant Orders    Testosterone    Hypothyroidism due to acquired atrophy of thyroid    Overview     Continue synthroid 150 mg  TSH and T4 today. Unclear why patient reported endocrinologist but does not see for diabetes             Other Visit Diagnoses     Hypothyroidism, unspecified type        Relevant Orders    T4, Free    TSH    Type 2 diabetes mellitus without complication, with long-term current use of insulin        Relevant Medications    glipiZIDE (GLUCOTROL) 10 MG tablet     SITagliptan-metformin (JANUMET) 50-1,000 mg per tablet    dulaglutide (TRULICITY) 0.75 mg/0.5 mL pen injector    Other Relevant Orders    Hemoglobin A1C    Testosterone      Follow up in about 3 months (around 10/25/2022) for Testosterone.

## 2022-08-11 ENCOUNTER — INFUSION (OUTPATIENT)
Dept: INFUSION THERAPY | Facility: HOSPITAL | Age: 47
End: 2022-08-11
Attending: INTERNAL MEDICINE
Payer: MEDICARE

## 2022-08-11 VITALS
OXYGEN SATURATION: 98 % | DIASTOLIC BLOOD PRESSURE: 82 MMHG | RESPIRATION RATE: 18 BRPM | TEMPERATURE: 99 F | HEART RATE: 84 BPM | SYSTOLIC BLOOD PRESSURE: 137 MMHG | WEIGHT: 216.69 LBS | BODY MASS INDEX: 31.09 KG/M2

## 2022-08-11 DIAGNOSIS — K50.919 CROHN'S DISEASE WITH COMPLICATION, UNSPECIFIED GASTROINTESTINAL TRACT LOCATION: Primary | ICD-10-CM

## 2022-08-11 PROCEDURE — 63600175 PHARM REV CODE 636 W HCPCS: Mod: JG | Performed by: INTERNAL MEDICINE

## 2022-08-11 PROCEDURE — 96365 THER/PROPH/DIAG IV INF INIT: CPT

## 2022-08-11 PROCEDURE — 25000003 PHARM REV CODE 250: Performed by: INTERNAL MEDICINE

## 2022-08-11 RX ORDER — EPINEPHRINE 1 MG/ML
0.3 INJECTION, SOLUTION, CONCENTRATE INTRAVENOUS
Status: CANCELLED | OUTPATIENT
Start: 2022-09-01

## 2022-08-11 RX ORDER — HEPARIN 100 UNIT/ML
500 SYRINGE INTRAVENOUS
Status: CANCELLED | OUTPATIENT
Start: 2022-09-01

## 2022-08-11 RX ORDER — SODIUM CHLORIDE 0.9 % (FLUSH) 0.9 %
10 SYRINGE (ML) INJECTION
Status: CANCELLED | OUTPATIENT
Start: 2022-09-01

## 2022-08-11 RX ORDER — EPINEPHRINE 0.3 MG/.3ML
0.3 INJECTION SUBCUTANEOUS
Status: ACTIVE | OUTPATIENT
Start: 2022-08-11 | End: 2022-08-11

## 2022-08-11 RX ORDER — HEPARIN 100 UNIT/ML
500 SYRINGE INTRAVENOUS
Status: DISCONTINUED | OUTPATIENT
Start: 2022-08-11 | End: 2022-08-11 | Stop reason: HOSPADM

## 2022-08-11 RX ORDER — METHYLPREDNISOLONE SOD SUCC 125 MG
40 VIAL (EA) INJECTION
Status: CANCELLED | OUTPATIENT
Start: 2022-09-01

## 2022-08-11 RX ORDER — IPRATROPIUM BROMIDE AND ALBUTEROL SULFATE 2.5; .5 MG/3ML; MG/3ML
3 SOLUTION RESPIRATORY (INHALATION)
Status: CANCELLED | OUTPATIENT
Start: 2022-09-01

## 2022-08-11 RX ORDER — SODIUM CHLORIDE 0.9 % (FLUSH) 0.9 %
10 SYRINGE (ML) INJECTION
Status: DISCONTINUED | OUTPATIENT
Start: 2022-08-11 | End: 2022-08-11 | Stop reason: HOSPADM

## 2022-08-11 RX ORDER — ACETAMINOPHEN 325 MG/1
650 TABLET ORAL
Status: ACTIVE | OUTPATIENT
Start: 2022-08-11 | End: 2022-08-11

## 2022-08-11 RX ORDER — IPRATROPIUM BROMIDE AND ALBUTEROL SULFATE 2.5; .5 MG/3ML; MG/3ML
3 SOLUTION RESPIRATORY (INHALATION)
Status: DISCONTINUED | OUTPATIENT
Start: 2022-08-11 | End: 2022-08-11

## 2022-08-11 RX ORDER — ACETAMINOPHEN 325 MG/1
650 TABLET ORAL
Status: CANCELLED | OUTPATIENT
Start: 2022-09-01

## 2022-08-11 RX ORDER — DIPHENHYDRAMINE HYDROCHLORIDE 50 MG/ML
25 INJECTION INTRAMUSCULAR; INTRAVENOUS
Status: ACTIVE | OUTPATIENT
Start: 2022-08-11 | End: 2022-08-11

## 2022-08-11 RX ORDER — METHYLPREDNISOLONE SOD SUCC 125 MG
40 VIAL (EA) INJECTION
Status: ACTIVE | OUTPATIENT
Start: 2022-08-11 | End: 2022-08-11

## 2022-08-11 RX ORDER — DIPHENHYDRAMINE HYDROCHLORIDE 50 MG/ML
25 INJECTION INTRAMUSCULAR; INTRAVENOUS
Status: CANCELLED | OUTPATIENT
Start: 2022-09-01

## 2022-08-11 RX ADMIN — VEDOLIZUMAB 300 MG: 300 INJECTION, POWDER, LYOPHILIZED, FOR SOLUTION INTRAVENOUS at 12:08

## 2022-08-22 RX ORDER — LISINOPRIL 20 MG/1
20 TABLET ORAL DAILY
Qty: 30 TABLET | Refills: 11 | Status: SHIPPED | OUTPATIENT
Start: 2022-08-22 | End: 2023-03-27

## 2022-09-19 ENCOUNTER — INFUSION (OUTPATIENT)
Dept: INFUSION THERAPY | Facility: HOSPITAL | Age: 47
End: 2022-09-19
Attending: INTERNAL MEDICINE
Payer: MEDICARE

## 2022-09-19 VITALS
SYSTOLIC BLOOD PRESSURE: 146 MMHG | OXYGEN SATURATION: 97 % | RESPIRATION RATE: 18 BRPM | DIASTOLIC BLOOD PRESSURE: 79 MMHG | HEART RATE: 79 BPM | TEMPERATURE: 98 F

## 2022-09-19 DIAGNOSIS — K50.919 CROHN'S DISEASE WITH COMPLICATION, UNSPECIFIED GASTROINTESTINAL TRACT LOCATION: Primary | ICD-10-CM

## 2022-09-19 PROCEDURE — 63600175 PHARM REV CODE 636 W HCPCS: Mod: JG | Performed by: INTERNAL MEDICINE

## 2022-09-19 PROCEDURE — 96365 THER/PROPH/DIAG IV INF INIT: CPT

## 2022-09-19 PROCEDURE — 25000003 PHARM REV CODE 250: Performed by: INTERNAL MEDICINE

## 2022-09-19 RX ORDER — HEPARIN 100 UNIT/ML
500 SYRINGE INTRAVENOUS
Status: DISCONTINUED | OUTPATIENT
Start: 2022-09-19 | End: 2022-09-19 | Stop reason: HOSPADM

## 2022-09-19 RX ORDER — EPINEPHRINE 1 MG/ML
0.3 INJECTION, SOLUTION, CONCENTRATE INTRAVENOUS
Status: CANCELLED | OUTPATIENT
Start: 2022-10-03

## 2022-09-19 RX ORDER — DIPHENHYDRAMINE HYDROCHLORIDE 50 MG/ML
25 INJECTION INTRAMUSCULAR; INTRAVENOUS
Status: ACTIVE | OUTPATIENT
Start: 2022-09-19 | End: 2022-09-19

## 2022-09-19 RX ORDER — METHYLPREDNISOLONE SOD SUCC 125 MG
40 VIAL (EA) INJECTION
Status: CANCELLED | OUTPATIENT
Start: 2022-10-03

## 2022-09-19 RX ORDER — SODIUM CHLORIDE 0.9 % (FLUSH) 0.9 %
10 SYRINGE (ML) INJECTION
Status: DISCONTINUED | OUTPATIENT
Start: 2022-09-19 | End: 2022-09-19 | Stop reason: HOSPADM

## 2022-09-19 RX ORDER — HEPARIN 100 UNIT/ML
500 SYRINGE INTRAVENOUS
Status: CANCELLED | OUTPATIENT
Start: 2022-10-03

## 2022-09-19 RX ORDER — METHYLPREDNISOLONE SOD SUCC 125 MG
40 VIAL (EA) INJECTION
Status: ACTIVE | OUTPATIENT
Start: 2022-09-19 | End: 2022-09-19

## 2022-09-19 RX ORDER — ACETAMINOPHEN 325 MG/1
650 TABLET ORAL
Status: ACTIVE | OUTPATIENT
Start: 2022-09-19 | End: 2022-09-19

## 2022-09-19 RX ORDER — ACETAMINOPHEN 325 MG/1
650 TABLET ORAL
Status: CANCELLED | OUTPATIENT
Start: 2022-10-03

## 2022-09-19 RX ORDER — SODIUM CHLORIDE 0.9 % (FLUSH) 0.9 %
10 SYRINGE (ML) INJECTION
Status: CANCELLED | OUTPATIENT
Start: 2022-10-03

## 2022-09-19 RX ORDER — DIPHENHYDRAMINE HYDROCHLORIDE 50 MG/ML
25 INJECTION INTRAMUSCULAR; INTRAVENOUS
Status: CANCELLED | OUTPATIENT
Start: 2022-10-03

## 2022-09-19 RX ORDER — EPINEPHRINE 1 MG/ML
0.3 INJECTION, SOLUTION, CONCENTRATE INTRAVENOUS
Status: ACTIVE | OUTPATIENT
Start: 2022-09-19 | End: 2022-09-19

## 2022-09-19 RX ADMIN — VEDOLIZUMAB 300 MG: 300 INJECTION, POWDER, LYOPHILIZED, FOR SOLUTION INTRAVENOUS at 10:09

## 2022-09-19 NOTE — PLAN OF CARE
Pt tolerated infusion well. Next appt reviewed with pt. Pt denied questions or further needs at the time of discharge.

## 2022-10-17 ENCOUNTER — INFUSION (OUTPATIENT)
Dept: INFUSION THERAPY | Facility: HOSPITAL | Age: 47
End: 2022-10-17
Attending: INTERNAL MEDICINE
Payer: MEDICARE

## 2022-10-17 VITALS
HEART RATE: 74 BPM | DIASTOLIC BLOOD PRESSURE: 66 MMHG | SYSTOLIC BLOOD PRESSURE: 138 MMHG | RESPIRATION RATE: 20 BRPM | OXYGEN SATURATION: 98 % | TEMPERATURE: 99 F

## 2022-10-17 DIAGNOSIS — K50.919 CROHN'S DISEASE WITH COMPLICATION, UNSPECIFIED GASTROINTESTINAL TRACT LOCATION: Primary | ICD-10-CM

## 2022-10-17 PROCEDURE — 63600175 PHARM REV CODE 636 W HCPCS: Mod: JG | Performed by: INTERNAL MEDICINE

## 2022-10-17 PROCEDURE — 96413 CHEMO IV INFUSION 1 HR: CPT

## 2022-10-17 PROCEDURE — 25000003 PHARM REV CODE 250: Performed by: INTERNAL MEDICINE

## 2022-10-17 RX ORDER — ACETAMINOPHEN 325 MG/1
650 TABLET ORAL
Status: ACTIVE | OUTPATIENT
Start: 2022-10-17 | End: 2022-10-17

## 2022-10-17 RX ORDER — EPINEPHRINE 1 MG/ML
0.3 INJECTION, SOLUTION, CONCENTRATE INTRAVENOUS
Status: DISPENSED | OUTPATIENT
Start: 2022-10-17 | End: 2022-10-17

## 2022-10-17 RX ORDER — HEPARIN 100 UNIT/ML
500 SYRINGE INTRAVENOUS
Status: DISCONTINUED | OUTPATIENT
Start: 2022-10-17 | End: 2022-10-17 | Stop reason: HOSPADM

## 2022-10-17 RX ORDER — DIPHENHYDRAMINE HYDROCHLORIDE 50 MG/ML
25 INJECTION INTRAMUSCULAR; INTRAVENOUS
Status: ACTIVE | OUTPATIENT
Start: 2022-10-17 | End: 2022-10-17

## 2022-10-17 RX ORDER — DIPHENHYDRAMINE HYDROCHLORIDE 50 MG/ML
25 INJECTION INTRAMUSCULAR; INTRAVENOUS
Status: CANCELLED | OUTPATIENT
Start: 2022-11-14

## 2022-10-17 RX ORDER — METHYLPREDNISOLONE SOD SUCC 125 MG
40 VIAL (EA) INJECTION
Status: ACTIVE | OUTPATIENT
Start: 2022-10-17 | End: 2022-10-17

## 2022-10-17 RX ORDER — HEPARIN 100 UNIT/ML
500 SYRINGE INTRAVENOUS
Status: CANCELLED | OUTPATIENT
Start: 2022-11-14

## 2022-10-17 RX ORDER — EPINEPHRINE 1 MG/ML
0.3 INJECTION, SOLUTION, CONCENTRATE INTRAVENOUS
Status: CANCELLED | OUTPATIENT
Start: 2022-11-14

## 2022-10-17 RX ORDER — SODIUM CHLORIDE 0.9 % (FLUSH) 0.9 %
10 SYRINGE (ML) INJECTION
Status: DISCONTINUED | OUTPATIENT
Start: 2022-10-17 | End: 2022-10-17 | Stop reason: HOSPADM

## 2022-10-17 RX ORDER — ACETAMINOPHEN 325 MG/1
650 TABLET ORAL
Status: CANCELLED | OUTPATIENT
Start: 2022-11-14

## 2022-10-17 RX ORDER — METHYLPREDNISOLONE SOD SUCC 125 MG
40 VIAL (EA) INJECTION
Status: CANCELLED | OUTPATIENT
Start: 2022-11-14

## 2022-10-17 RX ORDER — SODIUM CHLORIDE 0.9 % (FLUSH) 0.9 %
10 SYRINGE (ML) INJECTION
Status: CANCELLED | OUTPATIENT
Start: 2022-11-14

## 2022-10-17 RX ADMIN — SODIUM CHLORIDE: 9 INJECTION, SOLUTION INTRAVENOUS at 11:10

## 2022-10-17 RX ADMIN — VEDOLIZUMAB 300 MG: 300 INJECTION, POWDER, LYOPHILIZED, FOR SOLUTION INTRAVENOUS at 10:10

## 2022-10-21 DIAGNOSIS — R79.89 LOW TESTOSTERONE IN MALE: Primary | ICD-10-CM

## 2022-10-21 RX ORDER — TESTOSTERONE CYPIONATE 200 MG/ML
100 INJECTION, SOLUTION INTRAMUSCULAR
Qty: 5 ML | Refills: 2 | Status: SHIPPED | OUTPATIENT
Start: 2022-10-21 | End: 2022-10-21 | Stop reason: SDUPTHER

## 2022-10-21 RX ORDER — TESTOSTERONE CYPIONATE 200 MG/ML
100 INJECTION, SOLUTION INTRAMUSCULAR
Qty: 5 ML | Refills: 2 | Status: SHIPPED | OUTPATIENT
Start: 2022-10-21 | End: 2023-05-05 | Stop reason: SDUPTHER

## 2022-10-25 ENCOUNTER — OFFICE VISIT (OUTPATIENT)
Dept: FAMILY MEDICINE | Facility: CLINIC | Age: 47
End: 2022-10-25
Payer: MEDICARE

## 2022-10-25 VITALS
BODY MASS INDEX: 34.36 KG/M2 | HEIGHT: 70 IN | RESPIRATION RATE: 20 BRPM | WEIGHT: 240 LBS | TEMPERATURE: 99 F | OXYGEN SATURATION: 96 % | SYSTOLIC BLOOD PRESSURE: 127 MMHG | HEART RATE: 81 BPM | DIASTOLIC BLOOD PRESSURE: 86 MMHG

## 2022-10-25 DIAGNOSIS — E11.59 TYPE 2 DIABETES MELLITUS WITH OTHER CIRCULATORY COMPLICATION, WITHOUT LONG-TERM CURRENT USE OF INSULIN: ICD-10-CM

## 2022-10-25 DIAGNOSIS — E03.4 HYPOTHYROIDISM DUE TO ACQUIRED ATROPHY OF THYROID: ICD-10-CM

## 2022-10-25 DIAGNOSIS — R35.0 URINARY FREQUENCY: ICD-10-CM

## 2022-10-25 DIAGNOSIS — Z00.00 WELLNESS EXAMINATION: ICD-10-CM

## 2022-10-25 DIAGNOSIS — R79.89 LOW TESTOSTERONE: Primary | ICD-10-CM

## 2022-10-25 DIAGNOSIS — R79.9 ABNORMAL FINDING OF BLOOD CHEMISTRY, UNSPECIFIED: ICD-10-CM

## 2022-10-25 DIAGNOSIS — E03.9 HYPOTHYROIDISM, UNSPECIFIED TYPE: ICD-10-CM

## 2022-10-25 LAB
ALBUMIN SERPL-MCNC: 4.1 GM/DL (ref 3.5–5)
ALBUMIN/GLOB SERPL: 1.1 RATIO (ref 1.1–2)
ALP SERPL-CCNC: 62 UNIT/L (ref 40–150)
ALT SERPL-CCNC: 80 UNIT/L (ref 0–55)
AMORPH URATE CRY URNS QL MICRO: ABNORMAL /UL
APPEARANCE UR: ABNORMAL
AST SERPL-CCNC: 54 UNIT/L (ref 5–34)
BACTERIA #/AREA URNS AUTO: ABNORMAL /HPF
BASOPHILS # BLD AUTO: 0.07 X10(3)/MCL (ref 0–0.2)
BASOPHILS NFR BLD AUTO: 0.9 %
BILIRUB UR QL STRIP.AUTO: NEGATIVE MG/DL
BILIRUBIN DIRECT+TOT PNL SERPL-MCNC: 0.4 MG/DL
BUN SERPL-MCNC: 13.5 MG/DL (ref 8.9–20.6)
CALCIUM SERPL-MCNC: 9.3 MG/DL (ref 8.4–10.2)
CHLORIDE SERPL-SCNC: 106 MMOL/L (ref 98–107)
CHOLEST SERPL-MCNC: 124 MG/DL
CHOLEST/HDLC SERPL: 4 {RATIO} (ref 0–5)
CO2 SERPL-SCNC: 25 MMOL/L (ref 22–29)
COLOR UR AUTO: YELLOW
CREAT SERPL-MCNC: 1.02 MG/DL (ref 0.73–1.18)
EOSINOPHIL # BLD AUTO: 0.53 X10(3)/MCL (ref 0–0.9)
EOSINOPHIL NFR BLD AUTO: 6.9 %
ERYTHROCYTE [DISTWIDTH] IN BLOOD BY AUTOMATED COUNT: 15.5 % (ref 11.5–17)
EST. AVERAGE GLUCOSE BLD GHB EST-MCNC: 148.5 MG/DL
GFR SERPLBLD CREATININE-BSD FMLA CKD-EPI: >60 MLS/MIN/1.73/M2
GLOBULIN SER-MCNC: 3.6 GM/DL (ref 2.4–3.5)
GLUCOSE SERPL-MCNC: 89 MG/DL (ref 74–100)
GLUCOSE UR QL STRIP.AUTO: NEGATIVE MG/DL
HBA1C MFR BLD: 6.8 %
HCT VFR BLD AUTO: 38.3 % (ref 42–52)
HDLC SERPL-MCNC: 32 MG/DL (ref 35–60)
HGB BLD-MCNC: 12.1 GM/DL (ref 14–18)
HIV 1+2 AB+HIV1 P24 AG SERPL QL IA: NONREACTIVE
IMM GRANULOCYTES # BLD AUTO: 0.06 X10(3)/MCL (ref 0–0.04)
IMM GRANULOCYTES NFR BLD AUTO: 0.8 %
KETONES UR QL STRIP.AUTO: NEGATIVE MG/DL
LDLC SERPL CALC-MCNC: 57 MG/DL (ref 50–140)
LEUKOCYTE ESTERASE UR QL STRIP.AUTO: NEGATIVE UNIT/L
LYMPHOCYTES # BLD AUTO: 2.47 X10(3)/MCL (ref 0.6–4.6)
LYMPHOCYTES NFR BLD AUTO: 32.1 %
MCH RBC QN AUTO: 24.4 PG (ref 27–31)
MCHC RBC AUTO-ENTMCNC: 31.6 MG/DL (ref 33–36)
MCV RBC AUTO: 77.4 FL (ref 80–94)
MONOCYTES # BLD AUTO: 0.45 X10(3)/MCL (ref 0.1–1.3)
MONOCYTES NFR BLD AUTO: 5.9 %
MUCOUS THREADS URNS QL MICRO: ABNORMAL /LPF
NEUTROPHILS # BLD AUTO: 4.1 X10(3)/MCL (ref 2.1–9.2)
NEUTROPHILS NFR BLD AUTO: 53.4 %
NITRITE UR QL STRIP.AUTO: NEGATIVE
NRBC BLD AUTO-RTO: 0 %
PH UR STRIP.AUTO: 5.5 [PH]
PLATELET # BLD AUTO: 238 X10(3)/MCL (ref 130–400)
PMV BLD AUTO: 9.4 FL (ref 7.4–10.4)
POTASSIUM SERPL-SCNC: 4.8 MMOL/L (ref 3.5–5.1)
PROT SERPL-MCNC: 7.7 GM/DL (ref 6.4–8.3)
PROT UR QL STRIP.AUTO: NEGATIVE MG/DL
RBC # BLD AUTO: 4.95 X10(6)/MCL (ref 4.7–6.1)
RBC #/AREA URNS AUTO: ABNORMAL /HPF
RBC UR QL AUTO: NEGATIVE UNIT/L
SODIUM SERPL-SCNC: 140 MMOL/L (ref 136–145)
SP GR UR STRIP.AUTO: 1.02
SQUAMOUS #/AREA URNS LPF: ABNORMAL /HPF
T4 FREE SERPL-MCNC: 0.81 NG/DL (ref 0.7–1.48)
TRIGL SERPL-MCNC: 177 MG/DL (ref 34–140)
TSH SERPL-ACNC: 2.56 UIU/ML (ref 0.35–4.94)
UROBILINOGEN UR STRIP-ACNC: NORMAL MG/DL
VLDLC SERPL CALC-MCNC: 35 MG/DL
WBC # SPEC AUTO: 7.7 X10(3)/MCL (ref 4.5–11.5)
WBC #/AREA URNS AUTO: ABNORMAL /HPF

## 2022-10-25 PROCEDURE — 80061 LIPID PANEL: CPT | Performed by: STUDENT IN AN ORGANIZED HEALTH CARE EDUCATION/TRAINING PROGRAM

## 2022-10-25 PROCEDURE — 87389 HIV-1 AG W/HIV-1&-2 AB AG IA: CPT | Performed by: STUDENT IN AN ORGANIZED HEALTH CARE EDUCATION/TRAINING PROGRAM

## 2022-10-25 PROCEDURE — 36415 COLL VENOUS BLD VENIPUNCTURE: CPT | Performed by: STUDENT IN AN ORGANIZED HEALTH CARE EDUCATION/TRAINING PROGRAM

## 2022-10-25 PROCEDURE — 3074F SYST BP LT 130 MM HG: CPT | Mod: CPTII,,, | Performed by: STUDENT IN AN ORGANIZED HEALTH CARE EDUCATION/TRAINING PROGRAM

## 2022-10-25 PROCEDURE — 83036 HEMOGLOBIN GLYCOSYLATED A1C: CPT | Performed by: STUDENT IN AN ORGANIZED HEALTH CARE EDUCATION/TRAINING PROGRAM

## 2022-10-25 PROCEDURE — 3079F DIAST BP 80-89 MM HG: CPT | Mod: CPTII,,, | Performed by: STUDENT IN AN ORGANIZED HEALTH CARE EDUCATION/TRAINING PROGRAM

## 2022-10-25 PROCEDURE — 99214 PR OFFICE/OUTPT VISIT, EST, LEVL IV, 30-39 MIN: ICD-10-PCS | Mod: S$PBB,,, | Performed by: STUDENT IN AN ORGANIZED HEALTH CARE EDUCATION/TRAINING PROGRAM

## 2022-10-25 PROCEDURE — 99214 OFFICE O/P EST MOD 30 MIN: CPT | Mod: S$PBB,,, | Performed by: STUDENT IN AN ORGANIZED HEALTH CARE EDUCATION/TRAINING PROGRAM

## 2022-10-25 PROCEDURE — 85025 COMPLETE CBC W/AUTO DIFF WBC: CPT | Performed by: STUDENT IN AN ORGANIZED HEALTH CARE EDUCATION/TRAINING PROGRAM

## 2022-10-25 PROCEDURE — 3079F PR MOST RECENT DIASTOLIC BLOOD PRESSURE 80-89 MM HG: ICD-10-PCS | Mod: CPTII,,, | Performed by: STUDENT IN AN ORGANIZED HEALTH CARE EDUCATION/TRAINING PROGRAM

## 2022-10-25 PROCEDURE — 84443 ASSAY THYROID STIM HORMONE: CPT | Performed by: STUDENT IN AN ORGANIZED HEALTH CARE EDUCATION/TRAINING PROGRAM

## 2022-10-25 PROCEDURE — 1159F PR MEDICATION LIST DOCUMENTED IN MEDICAL RECORD: ICD-10-PCS | Mod: CPTII,,, | Performed by: STUDENT IN AN ORGANIZED HEALTH CARE EDUCATION/TRAINING PROGRAM

## 2022-10-25 PROCEDURE — 84439 ASSAY OF FREE THYROXINE: CPT | Performed by: STUDENT IN AN ORGANIZED HEALTH CARE EDUCATION/TRAINING PROGRAM

## 2022-10-25 PROCEDURE — 81001 URINALYSIS AUTO W/SCOPE: CPT | Performed by: STUDENT IN AN ORGANIZED HEALTH CARE EDUCATION/TRAINING PROGRAM

## 2022-10-25 PROCEDURE — 4010F PR ACE/ARB THEARPY RXD/TAKEN: ICD-10-PCS | Mod: CPTII,,, | Performed by: STUDENT IN AN ORGANIZED HEALTH CARE EDUCATION/TRAINING PROGRAM

## 2022-10-25 PROCEDURE — 3074F PR MOST RECENT SYSTOLIC BLOOD PRESSURE < 130 MM HG: ICD-10-PCS | Mod: CPTII,,, | Performed by: STUDENT IN AN ORGANIZED HEALTH CARE EDUCATION/TRAINING PROGRAM

## 2022-10-25 PROCEDURE — 4010F ACE/ARB THERAPY RXD/TAKEN: CPT | Mod: CPTII,,, | Performed by: STUDENT IN AN ORGANIZED HEALTH CARE EDUCATION/TRAINING PROGRAM

## 2022-10-25 PROCEDURE — 80053 COMPREHEN METABOLIC PANEL: CPT | Performed by: STUDENT IN AN ORGANIZED HEALTH CARE EDUCATION/TRAINING PROGRAM

## 2022-10-25 PROCEDURE — 1159F MED LIST DOCD IN RCRD: CPT | Mod: CPTII,,, | Performed by: STUDENT IN AN ORGANIZED HEALTH CARE EDUCATION/TRAINING PROGRAM

## 2022-10-25 PROCEDURE — 99215 OFFICE O/P EST HI 40 MIN: CPT | Mod: PBBFAC,PN | Performed by: STUDENT IN AN ORGANIZED HEALTH CARE EDUCATION/TRAINING PROGRAM

## 2022-10-25 RX ORDER — BUSPIRONE HYDROCHLORIDE 7.5 MG/1
7.5 TABLET ORAL 3 TIMES DAILY
COMMUNITY

## 2022-10-26 PROBLEM — R35.0 URINARY FREQUENCY: Status: ACTIVE | Noted: 2022-10-26

## 2022-10-26 NOTE — PROGRESS NOTES
Subjective:       Patient ID: Sheila Hamilton is a 46 y.o. male.    Chief Complaint: Follow-up    HPI   Patient was seen nurse practitioner Jalil moved away BLANCA has been signed  Patient is still receiving Testosterone injections at clinic in [place]. States that they are more convenient than for him to come here.   Patient lives in [place]  Interval history  Patient states he recently hospital stay secondary to bowel surgery and cholecystectomy    Crohn's disease  sees Dr. Trent has infusion every month at [facility] has an ileostomy  States he does not need any medical supplies at this time but his PCP was the one who ordered all of his ostomy supplies for him  Protonix 40 meq daily      Hypothyroidism  takes levothyroxine 125 mcg states he has an endocrinologist does not recall name. This visit states he does not have an endocrinologist    diabetes  Reports last A1c 8.1 which was taken last week is on glipizide 10 mg twice daily  Janumet 50/1000 mg twice daily  States he was given prescription for insulin has only been able to use Trulicty 0.75 mg weekly  Does not check blood sugar at home and has had urinary frequency    Anxiety  Sees a psychiatrist who has prescribed diazepam 10 mg 3 times daily  Bupropion 150 mg twice daily  Trileptal 300 mg twice daily  Trazodone 50 mg at bedtime  Bsupar 5 mg     Reactive airway disease  Uses albuterol as needed  Also uses Flonase twice daily for allergies    Hypertension  Is on lisinopril 20  States that this was recently changed however he does not know the name of the new medication until it comes in the mail  Also takes potassium chloride 20 mEq a day    History of stent placement in legs  sees [facility]  Takes clopidogrel 75 mg daily    HLD  Rosuvastatin 10 at bedtime    Low testosterone  testosterone 200 mg IN. Patient reports that he takes every week.  Long discussion as patient receiving these injections from provider in [place]. Does not manage just  gives injections.  States that the drive here is too far. Has been getting them weekly but reports missing several due to hospitalizations.         Will revisit once records arrive    Review of Systems      Objective:      Physical Exam  Constitutional:       General: He is not in acute distress.     Appearance: He is ill-appearing.   Cardiovascular:      Rate and Rhythm: Normal rate and regular rhythm.      Heart sounds: No murmur heard.  Abdominal:      Comments: colostomy   Musculoskeletal:         General: Normal range of motion.   Skin:     General: Skin is warm and dry.   Neurological:      General: No focal deficit present.      Mental Status: He is oriented to person, place, and time.   Psychiatric:         Mood and Affect: Mood normal.       Assessment:       Problem List Items Addressed This Visit          Renal/    Urinary frequency    Relevant Orders    Urinalysis (Completed)       Endocrine    Low testosterone - Primary    Relevant Orders    Testosterone    Hypothyroidism due to acquired atrophy of thyroid     Other Visit Diagnoses       Wellness examination        Relevant Orders    HIV 1/2 Ag/Ab (4th Gen) (Completed)    CBC Auto Differential (Completed)    Comprehensive Metabolic Panel (Completed)    Lipid Panel (Completed)    Hypothyroidism, unspecified type        Relevant Orders    T4, Free (Completed)    TSH (Completed)    Type 2 diabetes mellitus with other circulatory complication, without long-term current use of insulin        Relevant Orders    Hemoglobin A1C (Completed)    Body mass index (BMI) 30.0-30.9, adult        Relevant Orders    CBC Auto Differential (Completed)    Abnormal finding of blood chemistry, unspecified        Relevant Orders    Lipid Panel (Completed)            Plan:       Problem List Items Addressed This Visit          Renal/    Urinary frequency    Overview     UA today          Relevant Orders    Urinalysis (Completed)       Endocrine    Low testosterone -  Primary    Overview     Testosterone level today          Relevant Orders    Testosterone    Hypothyroidism due to acquired atrophy of thyroid    Overview     Continue synthroid 150 mg  TSH and T4 today. Unclear why patient reported endocrinologist          Other Visit Diagnoses       Wellness examination        Relevant Orders    HIV 1/2 Ag/Ab (4th Gen) (Completed)    CBC Auto Differential (Completed)    Comprehensive Metabolic Panel (Completed)    Lipid Panel (Completed)    Hypothyroidism, unspecified type        Relevant Orders    T4, Free (Completed)    TSH (Completed)    Type 2 diabetes mellitus with other circulatory complication, without long-term current use of insulin        Relevant Orders    Hemoglobin A1C (Completed)    Body mass index (BMI) 30.0-30.9, adult        Relevant Orders    CBC Auto Differential (Completed)    Abnormal finding of blood chemistry, unspecified        Relevant Orders    Lipid Panel (Completed)           Problem List Items Addressed This Visit          Renal/    Urinary frequency    Overview     UA today          Relevant Orders    Urinalysis (Completed)       Endocrine    Low testosterone - Primary    Overview     Testosterone level today          Relevant Orders    Testosterone    Hypothyroidism due to acquired atrophy of thyroid    Overview     Continue synthroid 150 mg  TSH and T4 today. Unclear why patient reported endocrinologist          Other Visit Diagnoses       Wellness examination        Relevant Orders    HIV 1/2 Ag/Ab (4th Gen) (Completed)    CBC Auto Differential (Completed)    Comprehensive Metabolic Panel (Completed)    Lipid Panel (Completed)    Hypothyroidism, unspecified type        Relevant Orders    T4, Free (Completed)    TSH (Completed)    Type 2 diabetes mellitus with other circulatory complication, without long-term current use of insulin        Relevant Orders    Hemoglobin A1C (Completed)    Body mass index (BMI) 30.0-30.9, adult        Relevant Orders     CBC Auto Differential (Completed)    Abnormal finding of blood chemistry, unspecified        Relevant Orders    Lipid Panel (Completed)        Follow up in about 3 months (around 1/25/2023) for Diabetes.

## 2022-11-09 ENCOUNTER — OFFICE VISIT (OUTPATIENT)
Dept: FAMILY MEDICINE | Facility: CLINIC | Age: 47
End: 2022-11-09
Payer: MEDICARE

## 2022-11-09 DIAGNOSIS — E03.4 HYPOTHYROIDISM DUE TO ACQUIRED ATROPHY OF THYROID: ICD-10-CM

## 2022-11-09 DIAGNOSIS — E11.59 TYPE 2 DIABETES MELLITUS WITH OTHER CIRCULATORY COMPLICATION, WITHOUT LONG-TERM CURRENT USE OF INSULIN: ICD-10-CM

## 2022-11-09 DIAGNOSIS — R79.89 LOW TESTOSTERONE: Primary | ICD-10-CM

## 2022-11-09 DIAGNOSIS — E11.9 TYPE 2 DIABETES MELLITUS WITHOUT COMPLICATION, WITHOUT LONG-TERM CURRENT USE OF INSULIN: ICD-10-CM

## 2022-11-09 PROBLEM — R35.0 URINARY FREQUENCY: Status: RESOLVED | Noted: 2022-10-26 | Resolved: 2022-11-09

## 2022-11-09 PROCEDURE — 4010F ACE/ARB THERAPY RXD/TAKEN: CPT | Mod: CPTII,95,, | Performed by: STUDENT IN AN ORGANIZED HEALTH CARE EDUCATION/TRAINING PROGRAM

## 2022-11-09 PROCEDURE — 99214 OFFICE O/P EST MOD 30 MIN: CPT | Mod: 95,,, | Performed by: STUDENT IN AN ORGANIZED HEALTH CARE EDUCATION/TRAINING PROGRAM

## 2022-11-09 PROCEDURE — 99214 PR OFFICE/OUTPT VISIT, EST, LEVL IV, 30-39 MIN: ICD-10-PCS | Mod: 95,,, | Performed by: STUDENT IN AN ORGANIZED HEALTH CARE EDUCATION/TRAINING PROGRAM

## 2022-11-09 PROCEDURE — 4010F PR ACE/ARB THEARPY RXD/TAKEN: ICD-10-PCS | Mod: CPTII,95,, | Performed by: STUDENT IN AN ORGANIZED HEALTH CARE EDUCATION/TRAINING PROGRAM

## 2022-11-09 RX ORDER — DULAGLUTIDE 0.75 MG/.5ML
1.5 INJECTION, SOLUTION SUBCUTANEOUS
Qty: 4 PEN | Refills: 11 | Status: SHIPPED | OUTPATIENT
Start: 2022-11-09 | End: 2022-11-09

## 2022-11-09 RX ORDER — DULAGLUTIDE 1.5 MG/.5ML
1.5 INJECTION, SOLUTION SUBCUTANEOUS
Qty: 4 PEN | Refills: 11 | Status: SHIPPED | OUTPATIENT
Start: 2022-11-09 | End: 2023-06-27

## 2022-11-09 NOTE — PROGRESS NOTES
Audio Only Telehealth Visit     The patient location is: home  The chief complaint leading to consultation is: Lab results, Testosterone  Visit type: Virtual visit with audio only (telephone)  Total time spent with patient: 15 minutes      The reason for the audio only service rather than synchronous audio and video virtual visit was related to technical difficulties or patient preference/necessity.     Each patient to whom I provide medical services by telemedicine is:  (1) informed of the relationship between the physician and patient and the respective role of any other health care provider with respect to management of the patient; and (2) notified that they may decline to receive medical services by telemedicine and may withdraw from such care at any time. Patient verbally consented to receive this service via voice-only telephone call.       HPI:   Patient was seen nurse practitioner Jalil moved away BLANCA has been signed  Patient is still receiving Testosterone injections at clinic in [place]. States that they are more convenient than for him to come here.   Patient lives in [place]    Crohn's disease  sees Dr. Trent has infusion every month at [facility] has an ileostomy  States he does not need any medical supplies at this time but his PCP was the one who ordered all of his ostomy supplies for him  Protonix 40 meq daily      Hypothyroidism  takes levothyroxine 125 mcg   TSH and T4 WNL    diabetes  A1c 6.8   week is on glipizide 10 mg twice daily  Janumet 50/1000 mg twice daily  States he was given prescription for insulin has only been able to use Trulicty 0.75 mg weekly  Does not check blood sugar at home     Anxiety  Sees a psychiatrist who has prescribed diazepam 10 mg 3 times daily  Bupropion 150 mg twice daily  Trileptal 300 mg twice daily  Trazodone 50 mg at bedtime  Bsupar 5 mg     Reactive airway disease  Uses albuterol as needed  Also uses Flonase twice daily for  allergies    Hypertension  Is on lisinopril 20  States that this was recently changed however he does not know the name of the new medication until it comes in the mail  Also takes potassium chloride 20 mEq a day    History of stent placement in legs  sees [facility]  Takes clopidogrel 75 mg daily    HLD  Rosuvastatin 10 at bedtime    Low testosterone  testosterone 200 mg IN. Patient reports that he takes every week.  Long discussion as patient receiving these injections from provider in [place]. Does not manage just gives injections.  States that the drive here is too far. Has been getting them weekly but reports missing several due to hospitalizations.         Assessment and plan:         Problem List Items Addressed This Visit          Endocrine    Type 2 diabetes mellitus without complication, without long-term current use of insulin    Overview     A1c 6.8  Glipizide,  Janumet  Trulicity increasing to 1.5 mg weekly. Encouraged CBG checks         Relevant Medications    dulaglutide (TRULICITY) 1.5 mg/0.5 mL pen injector    Low testosterone - Primary    Overview     Testosterone level not drawn.  Have placed order for patient to have completed at next infusion         Relevant Orders    Testosterone    Hypothyroidism due to acquired atrophy of thyroid    Overview     Continue synthroid 150 mg  TSH and T4 WNL          Other Visit Diagnoses       Type 2 diabetes mellitus with other circulatory complication, without long-term current use of insulin        Relevant Medications    dulaglutide (TRULICITY) 1.5 mg/0.5 mL pen injector               Follow up in about 6 weeks (around 12/21/2022) for Diabetes foot exam, eye exam, lab results .            This service was not originating from a related E/M service provided within the previous 7 days nor will  to an E/M service or procedure within the next 24 hours or my soonest available appointment.  Prevailing standard of care was able to be met in this audio-only visit.

## 2022-11-14 ENCOUNTER — INFUSION (OUTPATIENT)
Dept: INFUSION THERAPY | Facility: HOSPITAL | Age: 47
End: 2022-11-14
Attending: INTERNAL MEDICINE
Payer: MEDICARE

## 2022-11-14 VITALS
WEIGHT: 240 LBS | HEIGHT: 70 IN | HEART RATE: 67 BPM | SYSTOLIC BLOOD PRESSURE: 113 MMHG | DIASTOLIC BLOOD PRESSURE: 68 MMHG | RESPIRATION RATE: 18 BRPM | TEMPERATURE: 99 F | BODY MASS INDEX: 34.36 KG/M2

## 2022-11-14 DIAGNOSIS — K50.919 CROHN'S DISEASE WITH COMPLICATION, UNSPECIFIED GASTROINTESTINAL TRACT LOCATION: Primary | ICD-10-CM

## 2022-11-14 PROCEDURE — 25000003 PHARM REV CODE 250: Performed by: INTERNAL MEDICINE

## 2022-11-14 PROCEDURE — 96365 THER/PROPH/DIAG IV INF INIT: CPT

## 2022-11-14 PROCEDURE — 63600175 PHARM REV CODE 636 W HCPCS: Mod: JG | Performed by: INTERNAL MEDICINE

## 2022-11-14 RX ORDER — METHYLPREDNISOLONE SOD SUCC 125 MG
40 VIAL (EA) INJECTION
Status: CANCELLED | OUTPATIENT
Start: 2022-12-12

## 2022-11-14 RX ORDER — ACETAMINOPHEN 325 MG/1
650 TABLET ORAL
Status: ACTIVE | OUTPATIENT
Start: 2022-11-14 | End: 2022-11-14

## 2022-11-14 RX ORDER — SODIUM CHLORIDE 0.9 % (FLUSH) 0.9 %
10 SYRINGE (ML) INJECTION
Status: DISCONTINUED | OUTPATIENT
Start: 2022-11-14 | End: 2022-11-14 | Stop reason: HOSPADM

## 2022-11-14 RX ORDER — EPINEPHRINE 1 MG/ML
0.3 INJECTION, SOLUTION, CONCENTRATE INTRAVENOUS
Status: CANCELLED | OUTPATIENT
Start: 2022-12-12

## 2022-11-14 RX ORDER — SODIUM CHLORIDE 0.9 % (FLUSH) 0.9 %
10 SYRINGE (ML) INJECTION
Status: CANCELLED | OUTPATIENT
Start: 2022-12-12

## 2022-11-14 RX ORDER — EPINEPHRINE 1 MG/ML
0.3 INJECTION, SOLUTION, CONCENTRATE INTRAVENOUS
Status: ACTIVE | OUTPATIENT
Start: 2022-11-14 | End: 2022-11-14

## 2022-11-14 RX ORDER — HEPARIN 100 UNIT/ML
500 SYRINGE INTRAVENOUS
Status: DISCONTINUED | OUTPATIENT
Start: 2022-11-14 | End: 2022-11-14 | Stop reason: HOSPADM

## 2022-11-14 RX ORDER — METHYLPREDNISOLONE SOD SUCC 125 MG
40 VIAL (EA) INJECTION
Status: ACTIVE | OUTPATIENT
Start: 2022-11-14 | End: 2022-11-14

## 2022-11-14 RX ORDER — ACETAMINOPHEN 325 MG/1
650 TABLET ORAL
Status: CANCELLED | OUTPATIENT
Start: 2022-12-12

## 2022-11-14 RX ORDER — DIPHENHYDRAMINE HYDROCHLORIDE 50 MG/ML
25 INJECTION INTRAMUSCULAR; INTRAVENOUS
Status: ACTIVE | OUTPATIENT
Start: 2022-11-14 | End: 2022-11-14

## 2022-11-14 RX ORDER — DIPHENHYDRAMINE HYDROCHLORIDE 50 MG/ML
25 INJECTION INTRAMUSCULAR; INTRAVENOUS
Status: CANCELLED | OUTPATIENT
Start: 2022-12-12

## 2022-11-14 RX ORDER — HEPARIN 100 UNIT/ML
500 SYRINGE INTRAVENOUS
Status: CANCELLED | OUTPATIENT
Start: 2022-12-12

## 2022-11-14 RX ADMIN — VEDOLIZUMAB 300 MG: 300 INJECTION, POWDER, LYOPHILIZED, FOR SOLUTION INTRAVENOUS at 10:11

## 2022-12-12 ENCOUNTER — INFUSION (OUTPATIENT)
Dept: INFUSION THERAPY | Facility: HOSPITAL | Age: 47
End: 2022-12-12
Attending: INTERNAL MEDICINE
Payer: MEDICARE

## 2022-12-12 VITALS
HEART RATE: 83 BPM | OXYGEN SATURATION: 98 % | TEMPERATURE: 97 F | DIASTOLIC BLOOD PRESSURE: 80 MMHG | SYSTOLIC BLOOD PRESSURE: 135 MMHG | RESPIRATION RATE: 18 BRPM

## 2022-12-12 DIAGNOSIS — K50.919 CROHN'S DISEASE WITH COMPLICATION, UNSPECIFIED GASTROINTESTINAL TRACT LOCATION: Primary | ICD-10-CM

## 2022-12-12 PROCEDURE — 63600175 PHARM REV CODE 636 W HCPCS: Mod: JG | Performed by: INTERNAL MEDICINE

## 2022-12-12 PROCEDURE — 96365 THER/PROPH/DIAG IV INF INIT: CPT

## 2022-12-12 PROCEDURE — 25000003 PHARM REV CODE 250: Performed by: INTERNAL MEDICINE

## 2022-12-12 RX ORDER — METHYLPREDNISOLONE SOD SUCC 125 MG
40 VIAL (EA) INJECTION
Status: CANCELLED | OUTPATIENT
Start: 2023-01-09

## 2022-12-12 RX ORDER — DIPHENHYDRAMINE HYDROCHLORIDE 50 MG/ML
25 INJECTION INTRAMUSCULAR; INTRAVENOUS
Status: ACTIVE | OUTPATIENT
Start: 2022-12-12 | End: 2022-12-12

## 2022-12-12 RX ORDER — SODIUM CHLORIDE 0.9 % (FLUSH) 0.9 %
10 SYRINGE (ML) INJECTION
Status: DISCONTINUED | OUTPATIENT
Start: 2022-12-12 | End: 2022-12-12 | Stop reason: HOSPADM

## 2022-12-12 RX ORDER — EPINEPHRINE 1 MG/ML
0.3 INJECTION, SOLUTION, CONCENTRATE INTRAVENOUS
Status: CANCELLED | OUTPATIENT
Start: 2023-01-09

## 2022-12-12 RX ORDER — HEPARIN 100 UNIT/ML
500 SYRINGE INTRAVENOUS
Status: CANCELLED | OUTPATIENT
Start: 2023-01-09

## 2022-12-12 RX ORDER — EPINEPHRINE 1 MG/ML
0.3 INJECTION, SOLUTION, CONCENTRATE INTRAVENOUS
Status: DISPENSED | OUTPATIENT
Start: 2022-12-12 | End: 2022-12-12

## 2022-12-12 RX ORDER — ACETAMINOPHEN 325 MG/1
650 TABLET ORAL
Status: CANCELLED | OUTPATIENT
Start: 2023-01-09

## 2022-12-12 RX ORDER — HEPARIN 100 UNIT/ML
500 SYRINGE INTRAVENOUS
Status: DISCONTINUED | OUTPATIENT
Start: 2022-12-12 | End: 2022-12-12 | Stop reason: HOSPADM

## 2022-12-12 RX ORDER — DIPHENHYDRAMINE HYDROCHLORIDE 50 MG/ML
25 INJECTION INTRAMUSCULAR; INTRAVENOUS
Status: CANCELLED | OUTPATIENT
Start: 2023-01-09

## 2022-12-12 RX ORDER — SODIUM CHLORIDE 0.9 % (FLUSH) 0.9 %
10 SYRINGE (ML) INJECTION
Status: CANCELLED | OUTPATIENT
Start: 2023-01-09

## 2022-12-12 RX ORDER — ACETAMINOPHEN 325 MG/1
650 TABLET ORAL
Status: ACTIVE | OUTPATIENT
Start: 2022-12-12 | End: 2022-12-12

## 2022-12-12 RX ADMIN — VEDOLIZUMAB 300 MG: 300 INJECTION, POWDER, LYOPHILIZED, FOR SOLUTION INTRAVENOUS at 11:12

## 2022-12-23 ENCOUNTER — OFFICE VISIT (OUTPATIENT)
Dept: FAMILY MEDICINE | Facility: CLINIC | Age: 47
End: 2022-12-23
Payer: MEDICARE

## 2022-12-23 DIAGNOSIS — B37.9 YEAST INFECTION: Primary | ICD-10-CM

## 2022-12-23 DIAGNOSIS — B37.2 SKIN YEAST INFECTION: ICD-10-CM

## 2022-12-23 PROCEDURE — 4010F ACE/ARB THERAPY RXD/TAKEN: CPT | Mod: CPTII,95,, | Performed by: STUDENT IN AN ORGANIZED HEALTH CARE EDUCATION/TRAINING PROGRAM

## 2022-12-23 PROCEDURE — 4010F PR ACE/ARB THEARPY RXD/TAKEN: ICD-10-PCS | Mod: CPTII,95,, | Performed by: STUDENT IN AN ORGANIZED HEALTH CARE EDUCATION/TRAINING PROGRAM

## 2022-12-23 PROCEDURE — 99214 PR OFFICE/OUTPT VISIT, EST, LEVL IV, 30-39 MIN: ICD-10-PCS | Mod: 95,,, | Performed by: STUDENT IN AN ORGANIZED HEALTH CARE EDUCATION/TRAINING PROGRAM

## 2022-12-23 PROCEDURE — 99214 OFFICE O/P EST MOD 30 MIN: CPT | Mod: 95,,, | Performed by: STUDENT IN AN ORGANIZED HEALTH CARE EDUCATION/TRAINING PROGRAM

## 2022-12-23 RX ORDER — FLUCONAZOLE 150 MG/1
TABLET ORAL
Qty: 1 TABLET | Refills: 1 | Status: SHIPPED | OUTPATIENT
Start: 2022-12-23

## 2022-12-23 RX ORDER — CLOTRIMAZOLE 1 %
CREAM (GRAM) TOPICAL 2 TIMES DAILY
Qty: 113 G | Refills: 1 | Status: SHIPPED | OUTPATIENT
Start: 2022-12-23 | End: 2023-06-27

## 2022-12-23 NOTE — PROGRESS NOTES
Audio Only Telehealth Visit     The patient location is: home  The chief complaint leading to consultation is:  yeast infection/lab results  Visit type: Virtual visit with audio only (telephone)  Total time spent with patient: 15 minutes  The reason for the audio only service rather than synchronous audio and video virtual visit was related to technical difficulties or patient preference/necessity.     Each patient to whom I provide medical services by telemedicine is:  (1) informed of the relationship between the physician and patient and the respective role of any other health care provider with respect to management of the patient; and (2) notified that they may decline to receive medical services by telemedicine and may withdraw from such care at any time. Patient verbally consented to receive this service via voice-only telephone call.       HPI:     Patient with DM and has had URI but no abx use. Presenting via telemed for issues with yeast infection around penis. Cracked and peeling, itchy.     Reviewed testosterone and A1c with patient     Assessment and plan:      Problem List Items Addressed This Visit          ID    Skin yeast infection    Overview     Diflucan 150 mg x 2 days three days apart. Clotrimazole cream          Other Visit Diagnoses       Yeast infection    -  Primary    Relevant Medications    fluconazole (DIFLUCAN) 150 MG Tab    clotrimazole (LOTRIMIN) 1 % cream                                 This service was not originating from a related E/M service provided within the previous 7 days nor will  to an E/M service or procedure within the next 24 hours or my soonest available appointment.  Prevailing standard of care was able to be met in this audio-only visit.         Problem List Items Addressed This Visit    None  Visit Diagnoses       Yeast infection    -  Primary    Relevant Medications    fluconazole (DIFLUCAN) 150 MG Tab    clotrimazole (LOTRIMIN) 1 % cream         Follow up in  about 3 months (around 3/23/2023) for eye and foot exam and labs, Diabetes.

## 2023-01-09 ENCOUNTER — INFUSION (OUTPATIENT)
Dept: INFUSION THERAPY | Facility: HOSPITAL | Age: 48
End: 2023-01-09
Attending: INTERNAL MEDICINE
Payer: MEDICARE

## 2023-01-09 VITALS
RESPIRATION RATE: 18 BRPM | OXYGEN SATURATION: 96 % | DIASTOLIC BLOOD PRESSURE: 90 MMHG | TEMPERATURE: 99 F | SYSTOLIC BLOOD PRESSURE: 152 MMHG | HEART RATE: 70 BPM

## 2023-01-09 DIAGNOSIS — K50.919 CROHN'S DISEASE WITH COMPLICATION, UNSPECIFIED GASTROINTESTINAL TRACT LOCATION: Primary | ICD-10-CM

## 2023-01-09 PROCEDURE — 63600175 PHARM REV CODE 636 W HCPCS: Mod: JG | Performed by: INTERNAL MEDICINE

## 2023-01-09 PROCEDURE — 25000003 PHARM REV CODE 250: Performed by: INTERNAL MEDICINE

## 2023-01-09 PROCEDURE — 96413 CHEMO IV INFUSION 1 HR: CPT

## 2023-01-09 RX ORDER — METHYLPREDNISOLONE SOD SUCC 125 MG
40 VIAL (EA) INJECTION
Status: CANCELLED | OUTPATIENT
Start: 2023-01-09

## 2023-01-09 RX ORDER — ACETAMINOPHEN 325 MG/1
650 TABLET ORAL
Status: CANCELLED | OUTPATIENT
Start: 2023-01-09

## 2023-01-09 RX ORDER — DIPHENHYDRAMINE HYDROCHLORIDE 50 MG/ML
25 INJECTION INTRAMUSCULAR; INTRAVENOUS
Status: CANCELLED | OUTPATIENT
Start: 2023-01-09

## 2023-01-09 RX ORDER — SODIUM CHLORIDE 0.9 % (FLUSH) 0.9 %
10 SYRINGE (ML) INJECTION
Status: CANCELLED | OUTPATIENT
Start: 2023-01-09

## 2023-01-09 RX ORDER — EPINEPHRINE 1 MG/ML
0.3 INJECTION, SOLUTION, CONCENTRATE INTRAVENOUS
Status: ACTIVE | OUTPATIENT
Start: 2023-01-09 | End: 2023-01-09

## 2023-01-09 RX ORDER — ACETAMINOPHEN 325 MG/1
650 TABLET ORAL
Status: ACTIVE | OUTPATIENT
Start: 2023-01-09 | End: 2023-01-09

## 2023-01-09 RX ORDER — METHYLPREDNISOLONE SOD SUCC 125 MG
40 VIAL (EA) INJECTION
Status: ACTIVE | OUTPATIENT
Start: 2023-01-09 | End: 2023-01-09

## 2023-01-09 RX ORDER — HEPARIN 100 UNIT/ML
500 SYRINGE INTRAVENOUS
Status: DISCONTINUED | OUTPATIENT
Start: 2023-01-09 | End: 2023-01-09 | Stop reason: HOSPADM

## 2023-01-09 RX ORDER — SODIUM CHLORIDE 0.9 % (FLUSH) 0.9 %
10 SYRINGE (ML) INJECTION
Status: DISCONTINUED | OUTPATIENT
Start: 2023-01-09 | End: 2023-01-09 | Stop reason: HOSPADM

## 2023-01-09 RX ORDER — HEPARIN 100 UNIT/ML
500 SYRINGE INTRAVENOUS
Status: CANCELLED | OUTPATIENT
Start: 2023-01-09

## 2023-01-09 RX ORDER — EPINEPHRINE 1 MG/ML
0.3 INJECTION, SOLUTION, CONCENTRATE INTRAVENOUS
Status: CANCELLED | OUTPATIENT
Start: 2023-01-09

## 2023-01-09 RX ORDER — DIPHENHYDRAMINE HYDROCHLORIDE 50 MG/ML
25 INJECTION INTRAMUSCULAR; INTRAVENOUS
Status: ACTIVE | OUTPATIENT
Start: 2023-01-09 | End: 2023-01-09

## 2023-01-09 RX ADMIN — VEDOLIZUMAB 300 MG: 300 INJECTION, POWDER, LYOPHILIZED, FOR SOLUTION INTRAVENOUS at 09:01

## 2023-02-06 ENCOUNTER — INFUSION (OUTPATIENT)
Dept: INFUSION THERAPY | Facility: HOSPITAL | Age: 48
End: 2023-02-06
Attending: INTERNAL MEDICINE
Payer: MEDICARE

## 2023-02-06 VITALS
HEART RATE: 85 BPM | DIASTOLIC BLOOD PRESSURE: 84 MMHG | SYSTOLIC BLOOD PRESSURE: 148 MMHG | TEMPERATURE: 99 F | RESPIRATION RATE: 16 BRPM | OXYGEN SATURATION: 98 %

## 2023-02-06 DIAGNOSIS — K50.919 CROHN'S DISEASE WITH COMPLICATION, UNSPECIFIED GASTROINTESTINAL TRACT LOCATION: Primary | ICD-10-CM

## 2023-02-06 PROCEDURE — 63600175 PHARM REV CODE 636 W HCPCS: Mod: JG | Performed by: INTERNAL MEDICINE

## 2023-02-06 PROCEDURE — 25000003 PHARM REV CODE 250: Performed by: INTERNAL MEDICINE

## 2023-02-06 PROCEDURE — 96365 THER/PROPH/DIAG IV INF INIT: CPT

## 2023-02-06 RX ORDER — DIPHENHYDRAMINE HYDROCHLORIDE 50 MG/ML
25 INJECTION INTRAMUSCULAR; INTRAVENOUS EVERY 4 HOURS PRN
Status: CANCELLED | OUTPATIENT
Start: 2023-03-06

## 2023-02-06 RX ORDER — DIPHENHYDRAMINE HYDROCHLORIDE 50 MG/ML
25 INJECTION INTRAMUSCULAR; INTRAVENOUS
Status: CANCELLED | OUTPATIENT
Start: 2023-02-06

## 2023-02-06 RX ORDER — EPINEPHRINE 1 MG/ML
0.3 INJECTION, SOLUTION, CONCENTRATE INTRAVENOUS
Status: ACTIVE | OUTPATIENT
Start: 2023-02-06 | End: 2023-02-06

## 2023-02-06 RX ORDER — SODIUM CHLORIDE 0.9 % (FLUSH) 0.9 %
10 SYRINGE (ML) INJECTION
Status: CANCELLED | OUTPATIENT
Start: 2023-03-06

## 2023-02-06 RX ORDER — HEPARIN 100 UNIT/ML
500 SYRINGE INTRAVENOUS
Status: DISCONTINUED | OUTPATIENT
Start: 2023-02-06 | End: 2023-02-06 | Stop reason: HOSPADM

## 2023-02-06 RX ORDER — SODIUM CHLORIDE 0.9 % (FLUSH) 0.9 %
10 SYRINGE (ML) INJECTION
Status: CANCELLED | OUTPATIENT
Start: 2023-02-06

## 2023-02-06 RX ORDER — SODIUM CHLORIDE 0.9 % (FLUSH) 0.9 %
10 SYRINGE (ML) INJECTION
Status: DISCONTINUED | OUTPATIENT
Start: 2023-02-06 | End: 2023-02-06 | Stop reason: HOSPADM

## 2023-02-06 RX ORDER — HEPARIN 100 UNIT/ML
500 SYRINGE INTRAVENOUS
Status: CANCELLED | OUTPATIENT
Start: 2023-02-06

## 2023-02-06 RX ORDER — HEPARIN 100 UNIT/ML
500 SYRINGE INTRAVENOUS
Status: CANCELLED | OUTPATIENT
Start: 2023-03-06

## 2023-02-06 RX ORDER — ACETAMINOPHEN 325 MG/1
650 TABLET ORAL
Status: ACTIVE | OUTPATIENT
Start: 2023-02-06 | End: 2023-02-06

## 2023-02-06 RX ORDER — DIPHENHYDRAMINE HYDROCHLORIDE 50 MG/ML
25 INJECTION INTRAMUSCULAR; INTRAVENOUS
Status: DISCONTINUED | OUTPATIENT
Start: 2023-02-06 | End: 2023-02-06 | Stop reason: HOSPADM

## 2023-02-06 RX ORDER — DIPHENHYDRAMINE HYDROCHLORIDE 50 MG/ML
25 INJECTION INTRAMUSCULAR; INTRAVENOUS
Status: CANCELLED | OUTPATIENT
Start: 2023-03-06

## 2023-02-06 RX ORDER — ACETAMINOPHEN 325 MG/1
650 TABLET ORAL
Status: CANCELLED | OUTPATIENT
Start: 2023-03-06

## 2023-02-06 RX ORDER — METHYLPREDNISOLONE SOD SUCC 125 MG
40 VIAL (EA) INJECTION
Status: CANCELLED | OUTPATIENT
Start: 2023-03-06

## 2023-02-06 RX ORDER — EPINEPHRINE 1 MG/ML
0.3 INJECTION, SOLUTION, CONCENTRATE INTRAVENOUS
Status: CANCELLED | OUTPATIENT
Start: 2023-03-06

## 2023-02-06 RX ORDER — ACETAMINOPHEN 325 MG/1
650 TABLET ORAL
Status: CANCELLED | OUTPATIENT
Start: 2023-02-06

## 2023-02-06 RX ORDER — EPINEPHRINE 1 MG/ML
0.3 INJECTION, SOLUTION, CONCENTRATE INTRAVENOUS
Status: CANCELLED | OUTPATIENT
Start: 2023-02-06

## 2023-02-06 RX ORDER — METHYLPREDNISOLONE SOD SUCC 125 MG
40 VIAL (EA) INJECTION
Status: CANCELLED | OUTPATIENT
Start: 2023-02-06

## 2023-02-06 RX ADMIN — VEDOLIZUMAB 300 MG: 300 INJECTION, POWDER, LYOPHILIZED, FOR SOLUTION INTRAVENOUS at 10:02

## 2023-02-13 ENCOUNTER — PATIENT MESSAGE (OUTPATIENT)
Dept: ADMINISTRATIVE | Facility: HOSPITAL | Age: 48
End: 2023-02-13
Payer: MEDICARE

## 2023-02-14 NOTE — TELEPHONE ENCOUNTER
Care Due:                  Date            Visit Type   Department     Provider  --------------------------------------------------------------------------------                                ESTABLISHED                              PATIENT -    [facility]  Last Visit: 12-      VIRTUAL      MEDICINE       Alena Mcqueen                               -                              PRIMARY      [facility]  Next Visit: 03-      CARE ([facility])   MEDICINE       Alena Mcqueen                                                            Last  Test          Frequency    Reason                     Performed    Due Date  --------------------------------------------------------------------------------    HBA1C.......  6 months...  SITagliptan-metformin,     10-   04-                             dulaglutide..............    [employer] Embedded Care Gaps. Reference number: [id number]. 2/14/2023   12:06:09 PM CST

## 2023-02-14 NOTE — TELEPHONE ENCOUNTER
Refill Routing Note   Medication(s) are not appropriate for processing by [facility] for the following reason(s):       Outside of [facility] protocol    [facility] action(s):  Route                   Appointments  past 12m or future 3m with PCP    Date Provider   Last Visit   12/23/2022 Alena Mcqueen MD   Next Visit   3/27/2023 Alena Mcqueen MD   ED visits in past 90 days: 0        Note composed:12:05 PM 02/14/2023

## 2023-02-15 RX ORDER — ARIPIPRAZOLE 10 MG/1
10 TABLET ORAL DAILY
OUTPATIENT
Start: 2023-02-15

## 2023-03-06 ENCOUNTER — INFUSION (OUTPATIENT)
Dept: INFUSION THERAPY | Facility: HOSPITAL | Age: 48
End: 2023-03-06
Attending: INTERNAL MEDICINE
Payer: MEDICARE

## 2023-03-06 VITALS
HEART RATE: 86 BPM | RESPIRATION RATE: 18 BRPM | TEMPERATURE: 99 F | OXYGEN SATURATION: 98 % | HEIGHT: 70 IN | DIASTOLIC BLOOD PRESSURE: 91 MMHG | BODY MASS INDEX: 35.93 KG/M2 | WEIGHT: 251 LBS | SYSTOLIC BLOOD PRESSURE: 147 MMHG

## 2023-03-06 DIAGNOSIS — K50.919 CROHN'S DISEASE WITH COMPLICATION, UNSPECIFIED GASTROINTESTINAL TRACT LOCATION: Primary | ICD-10-CM

## 2023-03-06 PROCEDURE — 96365 THER/PROPH/DIAG IV INF INIT: CPT

## 2023-03-06 PROCEDURE — 63600175 PHARM REV CODE 636 W HCPCS: Mod: JZ,JG | Performed by: INTERNAL MEDICINE

## 2023-03-06 PROCEDURE — 25000003 PHARM REV CODE 250: Performed by: INTERNAL MEDICINE

## 2023-03-06 RX ORDER — HEPARIN 100 UNIT/ML
500 SYRINGE INTRAVENOUS
Status: CANCELLED | OUTPATIENT
Start: 2023-04-03

## 2023-03-06 RX ORDER — DIPHENHYDRAMINE HYDROCHLORIDE 50 MG/ML
25 INJECTION INTRAMUSCULAR; INTRAVENOUS EVERY 4 HOURS PRN
Status: DISCONTINUED | OUTPATIENT
Start: 2023-03-06 | End: 2023-03-06 | Stop reason: HOSPADM

## 2023-03-06 RX ORDER — ACETAMINOPHEN 325 MG/1
650 TABLET ORAL
Status: ACTIVE | OUTPATIENT
Start: 2023-03-06 | End: 2023-03-06

## 2023-03-06 RX ORDER — EPINEPHRINE 1 MG/ML
0.3 INJECTION, SOLUTION, CONCENTRATE INTRAVENOUS
Status: ACTIVE | OUTPATIENT
Start: 2023-03-06 | End: 2023-03-06

## 2023-03-06 RX ORDER — SODIUM CHLORIDE 0.9 % (FLUSH) 0.9 %
10 SYRINGE (ML) INJECTION
Status: CANCELLED | OUTPATIENT
Start: 2023-04-03

## 2023-03-06 RX ORDER — ACETAMINOPHEN 325 MG/1
650 TABLET ORAL
Status: CANCELLED | OUTPATIENT
Start: 2023-04-03

## 2023-03-06 RX ORDER — HEPARIN 100 UNIT/ML
500 SYRINGE INTRAVENOUS
Status: DISCONTINUED | OUTPATIENT
Start: 2023-03-06 | End: 2023-03-06 | Stop reason: HOSPADM

## 2023-03-06 RX ORDER — EPINEPHRINE 1 MG/ML
0.3 INJECTION, SOLUTION, CONCENTRATE INTRAVENOUS
Status: CANCELLED | OUTPATIENT
Start: 2023-04-03

## 2023-03-06 RX ORDER — SODIUM CHLORIDE 0.9 % (FLUSH) 0.9 %
10 SYRINGE (ML) INJECTION
Status: DISCONTINUED | OUTPATIENT
Start: 2023-03-06 | End: 2023-03-06 | Stop reason: HOSPADM

## 2023-03-06 RX ORDER — METHYLPREDNISOLONE SOD SUCC 125 MG
40 VIAL (EA) INJECTION
Status: CANCELLED | OUTPATIENT
Start: 2023-04-03

## 2023-03-06 RX ORDER — DIPHENHYDRAMINE HYDROCHLORIDE 50 MG/ML
25 INJECTION INTRAMUSCULAR; INTRAVENOUS EVERY 4 HOURS PRN
Status: CANCELLED | OUTPATIENT
Start: 2023-04-03

## 2023-03-06 RX ADMIN — SODIUM CHLORIDE: 9 INJECTION, SOLUTION INTRAVENOUS at 09:03

## 2023-03-06 RX ADMIN — VEDOLIZUMAB 300 MG: 300 INJECTION, POWDER, LYOPHILIZED, FOR SOLUTION INTRAVENOUS at 09:03

## 2023-03-27 ENCOUNTER — OFFICE VISIT (OUTPATIENT)
Dept: FAMILY MEDICINE | Facility: CLINIC | Age: 48
End: 2023-03-27
Payer: MEDICARE

## 2023-03-27 VITALS
OXYGEN SATURATION: 98 % | WEIGHT: 243.88 LBS | RESPIRATION RATE: 20 BRPM | SYSTOLIC BLOOD PRESSURE: 135 MMHG | TEMPERATURE: 99 F | BODY MASS INDEX: 34.91 KG/M2 | HEIGHT: 70 IN | DIASTOLIC BLOOD PRESSURE: 82 MMHG | HEART RATE: 103 BPM

## 2023-03-27 DIAGNOSIS — E03.4 HYPOTHYROIDISM DUE TO ACQUIRED ATROPHY OF THYROID: ICD-10-CM

## 2023-03-27 DIAGNOSIS — R74.8 ELEVATED LIVER ENZYMES: ICD-10-CM

## 2023-03-27 DIAGNOSIS — R16.0 HEPATOMEGALY: ICD-10-CM

## 2023-03-27 DIAGNOSIS — R79.89 LOW TESTOSTERONE: Primary | ICD-10-CM

## 2023-03-27 DIAGNOSIS — E11.9 TYPE 2 DIABETES MELLITUS WITHOUT COMPLICATION, WITHOUT LONG-TERM CURRENT USE OF INSULIN: ICD-10-CM

## 2023-03-27 LAB
ALBUMIN SERPL-MCNC: 4.6 G/DL (ref 3.5–5)
ALBUMIN/GLOB SERPL: 1.1 RATIO (ref 1.1–2)
ALP SERPL-CCNC: 81 UNIT/L (ref 40–150)
ALT SERPL-CCNC: 86 UNIT/L (ref 0–55)
AST SERPL-CCNC: 58 UNIT/L (ref 5–34)
BASOPHILS # BLD AUTO: 0.12 X10(3)/MCL (ref 0–0.2)
BASOPHILS NFR BLD AUTO: 1.1 %
BILIRUBIN DIRECT+TOT PNL SERPL-MCNC: 0.4 MG/DL
BUN SERPL-MCNC: 19.2 MG/DL (ref 8.9–20.6)
CALCIUM SERPL-MCNC: 9.9 MG/DL (ref 8.4–10.2)
CHLORIDE SERPL-SCNC: 104 MMOL/L (ref 98–107)
CO2 SERPL-SCNC: 21 MMOL/L (ref 22–29)
CREAT SERPL-MCNC: 1.47 MG/DL (ref 0.73–1.18)
EOSINOPHIL # BLD AUTO: 0.43 X10(3)/MCL (ref 0–0.9)
EOSINOPHIL NFR BLD AUTO: 4 %
ERYTHROCYTE [DISTWIDTH] IN BLOOD BY AUTOMATED COUNT: 15.2 % (ref 11.5–17)
EST. AVERAGE GLUCOSE BLD GHB EST-MCNC: 203 MG/DL
GFR SERPLBLD CREATININE-BSD FMLA CKD-EPI: 60 MLS/MIN/1.73/M2
GLOBULIN SER-MCNC: 4.3 GM/DL (ref 2.4–3.5)
GLUCOSE SERPL-MCNC: 287 MG/DL (ref 74–100)
HBA1C MFR BLD: 8.7 %
HCT VFR BLD AUTO: 43.8 % (ref 42–52)
HGB BLD-MCNC: 14.4 G/DL (ref 14–18)
IMM GRANULOCYTES # BLD AUTO: 0.16 X10(3)/MCL (ref 0–0.04)
IMM GRANULOCYTES NFR BLD AUTO: 1.5 %
LYMPHOCYTES # BLD AUTO: 2.93 X10(3)/MCL (ref 0.6–4.6)
LYMPHOCYTES NFR BLD AUTO: 27.6 %
MCH RBC QN AUTO: 24.4 PG (ref 27–31)
MCHC RBC AUTO-ENTMCNC: 32.9 G/DL (ref 33–36)
MCV RBC AUTO: 74.4 FL (ref 80–94)
MONOCYTES # BLD AUTO: 0.81 X10(3)/MCL (ref 0.1–1.3)
MONOCYTES NFR BLD AUTO: 7.6 %
NEUTROPHILS # BLD AUTO: 6.18 X10(3)/MCL (ref 2.1–9.2)
NEUTROPHILS NFR BLD AUTO: 58.2 %
NRBC BLD AUTO-RTO: 0 %
PLATELET # BLD AUTO: 311 X10(3)/MCL (ref 130–400)
PMV BLD AUTO: 9.8 FL (ref 7.4–10.4)
POTASSIUM SERPL-SCNC: 4.5 MMOL/L (ref 3.5–5.1)
PROT SERPL-MCNC: 8.9 GM/DL (ref 6.4–8.3)
RBC # BLD AUTO: 5.89 X10(6)/MCL (ref 4.7–6.1)
SODIUM SERPL-SCNC: 134 MMOL/L (ref 136–145)
T4 FREE SERPL-MCNC: 1.17 NG/DL (ref 0.7–1.48)
TESTOST SERPL-MCNC: 164.83 NG/DL (ref 240.24–870.68)
TSH SERPL-ACNC: 1.31 UIU/ML (ref 0.35–4.94)
WBC # SPEC AUTO: 10.6 X10(3)/MCL (ref 4.5–11.5)

## 2023-03-27 PROCEDURE — 3008F PR BODY MASS INDEX (BMI) DOCUMENTED: ICD-10-PCS | Mod: CPTII,,, | Performed by: STUDENT IN AN ORGANIZED HEALTH CARE EDUCATION/TRAINING PROGRAM

## 2023-03-27 PROCEDURE — 36415 COLL VENOUS BLD VENIPUNCTURE: CPT | Performed by: STUDENT IN AN ORGANIZED HEALTH CARE EDUCATION/TRAINING PROGRAM

## 2023-03-27 PROCEDURE — 84439 ASSAY OF FREE THYROXINE: CPT | Performed by: STUDENT IN AN ORGANIZED HEALTH CARE EDUCATION/TRAINING PROGRAM

## 2023-03-27 PROCEDURE — 3008F BODY MASS INDEX DOCD: CPT | Mod: CPTII,,, | Performed by: STUDENT IN AN ORGANIZED HEALTH CARE EDUCATION/TRAINING PROGRAM

## 2023-03-27 PROCEDURE — 84403 ASSAY OF TOTAL TESTOSTERONE: CPT | Performed by: STUDENT IN AN ORGANIZED HEALTH CARE EDUCATION/TRAINING PROGRAM

## 2023-03-27 PROCEDURE — 99214 OFFICE O/P EST MOD 30 MIN: CPT | Mod: S$PBB,25,, | Performed by: STUDENT IN AN ORGANIZED HEALTH CARE EDUCATION/TRAINING PROGRAM

## 2023-03-27 PROCEDURE — 83036 HEMOGLOBIN GLYCOSYLATED A1C: CPT | Performed by: STUDENT IN AN ORGANIZED HEALTH CARE EDUCATION/TRAINING PROGRAM

## 2023-03-27 PROCEDURE — 85025 COMPLETE CBC W/AUTO DIFF WBC: CPT | Performed by: STUDENT IN AN ORGANIZED HEALTH CARE EDUCATION/TRAINING PROGRAM

## 2023-03-27 PROCEDURE — 3075F PR MOST RECENT SYSTOLIC BLOOD PRESS GE 130-139MM HG: ICD-10-PCS | Mod: CPTII,,, | Performed by: STUDENT IN AN ORGANIZED HEALTH CARE EDUCATION/TRAINING PROGRAM

## 2023-03-27 PROCEDURE — 3079F DIAST BP 80-89 MM HG: CPT | Mod: CPTII,,, | Performed by: STUDENT IN AN ORGANIZED HEALTH CARE EDUCATION/TRAINING PROGRAM

## 2023-03-27 PROCEDURE — 99215 OFFICE O/P EST HI 40 MIN: CPT | Mod: PBBFAC,PN | Performed by: STUDENT IN AN ORGANIZED HEALTH CARE EDUCATION/TRAINING PROGRAM

## 2023-03-27 PROCEDURE — 84443 ASSAY THYROID STIM HORMONE: CPT | Performed by: STUDENT IN AN ORGANIZED HEALTH CARE EDUCATION/TRAINING PROGRAM

## 2023-03-27 PROCEDURE — 80053 COMPREHEN METABOLIC PANEL: CPT | Performed by: STUDENT IN AN ORGANIZED HEALTH CARE EDUCATION/TRAINING PROGRAM

## 2023-03-27 PROCEDURE — 3075F SYST BP GE 130 - 139MM HG: CPT | Mod: CPTII,,, | Performed by: STUDENT IN AN ORGANIZED HEALTH CARE EDUCATION/TRAINING PROGRAM

## 2023-03-27 PROCEDURE — 99214 PR OFFICE/OUTPT VISIT, EST, LEVL IV, 30-39 MIN: ICD-10-PCS | Mod: S$PBB,25,, | Performed by: STUDENT IN AN ORGANIZED HEALTH CARE EDUCATION/TRAINING PROGRAM

## 2023-03-27 PROCEDURE — 3079F PR MOST RECENT DIASTOLIC BLOOD PRESSURE 80-89 MM HG: ICD-10-PCS | Mod: CPTII,,, | Performed by: STUDENT IN AN ORGANIZED HEALTH CARE EDUCATION/TRAINING PROGRAM

## 2023-03-27 PROCEDURE — 1159F PR MEDICATION LIST DOCUMENTED IN MEDICAL RECORD: ICD-10-PCS | Mod: CPTII,,, | Performed by: STUDENT IN AN ORGANIZED HEALTH CARE EDUCATION/TRAINING PROGRAM

## 2023-03-27 PROCEDURE — 1159F MED LIST DOCD IN RCRD: CPT | Mod: CPTII,,, | Performed by: STUDENT IN AN ORGANIZED HEALTH CARE EDUCATION/TRAINING PROGRAM

## 2023-03-27 NOTE — PROGRESS NOTES
Patient Name: Sheila Hamilton   : 1975  MRN: [id number]     Subjective:   Patient ID: Sheila Hamilton is a 47 y.o. male.    Chief Complaint:   Chief Complaint   Patient presents with    Follow-up     Wanted to know about liver enzyme results from cardio        HPI: HPI  Patient was seen nurse practitioner aJlil moved away BLANCA has been signed  Patient is still receiving Testosterone injections at clinic in [place]. States that they are more convenient than for him to come here.   Patient lives in [place]    Crohn's disease  sees Dr. Trent has infusion every month at [facility] has an ileostomy  States he does not need any medical supplies at this time but his PCP was the one who ordered all of his ostomy supplies for him  Protonix 40 meq daily    Elevated Liver Enzymes  Noted previously. Also states his cardiologist asked him to discuss this with PCP  Patient reports that he has not seen his GI doc in several months and has not discussed this with him   Hep panel negative 2022    Hypothyroidism  takes levothyroxine 125 mcg   TSH and T4 WNL    diabetes  A1c 6.8   week is on glipizide 10 mg twice daily  Janumet  mg twice daily  Trulicty 0.75 mg weekly  Does not check blood sugar at home     Anxiety  Sees a psychiatrist who has prescribed diazepam 10 mg 3 times daily  Bupropion 150 mg twice daily  Trileptal 300 mg twice daily  Trazodone 50 mg at bedtime  Bsupar 5 mg     Reactive airway disease  Uses albuterol as needed  Also uses Flonase twice daily for allergies    Hypertension  Was on lisinopril 20 mg stopped in 2022  States that this was recently changed however he does not know the name of the new medication until it comes in the mail  Also takes potassium chloride 20 mEq a day    History of stent placement in legs  sees [facility]  Takes clopidogrel 75 mg daily    HLD  Rosuvastatin 10 at bedtime    Low testosterone  testosterone 200 mg injections Patient reports that he  "takes every 14 days week.  Long discussion as patient receiving these injections from provider in [place]. Does not manage just gives injections.  States that the drive here is too far. Has been getting them weekly but reports missing several due to hospitalizations.           ROS:  ROS   History:   History reviewed. No pertinent past medical history.   Past Surgical History:   Procedure Laterality Date    CHOLECYSTECTOMY      HERNIA REPAIR       History reviewed. No pertinent family history.   Social History     Tobacco Use    Smoking status: Never     Passive exposure: Never    Smokeless tobacco: Never   Substance and Sexual Activity    Alcohol use: Not Currently    Drug use: Never    Sexual activity: Yes     Partners: Female        Allergies:   Review of patient's allergies indicates:   Allergen Reactions    Bee venom protein (honey bee)     Insect venom      Objective:     Vitals:    03/27/23 0845   BP: 135/82   Pulse: 103   Resp: 20   Temp: 98.7 °F (37.1 °C)   TempSrc: Oral   SpO2: 98%   Weight: 110.6 kg (243 lb 14.4 oz)   Height: 5' 10" (1.778 m)     Body mass index is 35 kg/m².     Physical Examination:   Physical Exam  Cardiovascular:      Pulses:           Dorsalis pedis pulses are 2+ on the right side and 2+ on the left side.        Posterior tibial pulses are 2+ on the right side and 2+ on the left side.   Musculoskeletal:      Right foot: Normal range of motion. No deformity, bunion, Charcot foot, foot drop or prominent metatarsal heads.      Left foot: Normal range of motion. No deformity, bunion, Charcot foot, foot drop or prominent metatarsal heads.   Feet:      Right foot:      Protective Sensation: 5 sites tested.  5 sites sensed.      Skin integrity: Dry skin present. No ulcer, blister, skin breakdown, erythema, warmth, callus or fissure.      Toenail Condition: Right toenails are normal.      Left foot:      Protective Sensation: 5 sites tested.  4 sites sensed.      Skin integrity: Dry skin and " fissure present.      Toenail Condition: Left toenails are normal.      Comments: Decreased sensation left great toe       Assessment:   No diagnosis found.    Plan:     Problem List Items Addressed This Visit          Endocrine    Type 2 diabetes mellitus without complication, without long-term current use of insulin    Overview     A1c today   Glipizide,10 mg   Janumet 50/1000  Trulicity  1.5 mg weekly. Encouraged CBG checks         Relevant Orders    Hemoglobin A1C (Completed)    Ambulatory referral/consult to Diabetes Education    Low testosterone - Primary    Overview     Testosterone level today          Relevant Orders    CBC Auto Differential (Completed)    Testosterone (Completed)    Hypothyroidism due to acquired atrophy of thyroid    Overview     Continue synthroid 150 mg  TSH and T4 today          Relevant Orders    T4, Free (Completed)    TSH (Completed)       GI    Hepatomegaly    Overview     US abdomen  Referring to DE for discussion of hepatic steatosis  States diet is fatty   Hep panel negative 6/2022         Relevant Orders    Comprehensive Metabolic Panel (Completed)    Ambulatory referral/consult to Diabetes Education     Other Visit Diagnoses       Elevated liver enzymes        Relevant Orders    US Abdomen Limited         Reports having eye exam completed in [place]. Asked patient to call with name of eye doctor. States he may have macular degeneration.   Problem List Items Addressed This Visit    None   Follow up in about 3 months (around 6/27/2023) for Diabetes, hypothyroid.

## 2023-03-28 ENCOUNTER — TELEPHONE (OUTPATIENT)
Dept: DIABETES | Facility: CLINIC | Age: 48
End: 2023-03-28
Payer: MEDICARE

## 2023-03-31 NOTE — PROGRESS NOTES
Can you please see if we can get a telemed for this patient in the next week or so to discuss his labs? He isn't scheduled until 6/27 and I would like to see him sooner.

## 2023-04-03 ENCOUNTER — INFUSION (OUTPATIENT)
Dept: INFUSION THERAPY | Facility: HOSPITAL | Age: 48
End: 2023-04-03
Attending: INTERNAL MEDICINE
Payer: MEDICARE

## 2023-04-03 VITALS
BODY MASS INDEX: 35.48 KG/M2 | RESPIRATION RATE: 16 BRPM | TEMPERATURE: 99 F | SYSTOLIC BLOOD PRESSURE: 129 MMHG | HEART RATE: 87 BPM | WEIGHT: 247.31 LBS | DIASTOLIC BLOOD PRESSURE: 86 MMHG | OXYGEN SATURATION: 98 %

## 2023-04-03 DIAGNOSIS — K50.919 CROHN'S DISEASE WITH COMPLICATION, UNSPECIFIED GASTROINTESTINAL TRACT LOCATION: Primary | ICD-10-CM

## 2023-04-03 PROCEDURE — 25000003 PHARM REV CODE 250: Performed by: INTERNAL MEDICINE

## 2023-04-03 PROCEDURE — 63600175 PHARM REV CODE 636 W HCPCS: Mod: JZ,JG | Performed by: INTERNAL MEDICINE

## 2023-04-03 PROCEDURE — 96365 THER/PROPH/DIAG IV INF INIT: CPT

## 2023-04-03 RX ORDER — HEPARIN 100 UNIT/ML
500 SYRINGE INTRAVENOUS
Status: CANCELLED | OUTPATIENT
Start: 2023-05-01

## 2023-04-03 RX ORDER — DIPHENHYDRAMINE HYDROCHLORIDE 50 MG/ML
25 INJECTION INTRAMUSCULAR; INTRAVENOUS EVERY 4 HOURS PRN
Status: CANCELLED | OUTPATIENT
Start: 2023-05-01

## 2023-04-03 RX ORDER — EPINEPHRINE 1 MG/ML
0.3 INJECTION, SOLUTION, CONCENTRATE INTRAVENOUS
Status: ACTIVE | OUTPATIENT
Start: 2023-04-03 | End: 2023-04-03

## 2023-04-03 RX ORDER — EPINEPHRINE 1 MG/ML
0.3 INJECTION, SOLUTION, CONCENTRATE INTRAVENOUS
Status: CANCELLED | OUTPATIENT
Start: 2023-05-01

## 2023-04-03 RX ORDER — HEPARIN 100 UNIT/ML
500 SYRINGE INTRAVENOUS
Status: DISCONTINUED | OUTPATIENT
Start: 2023-04-03 | End: 2023-04-03 | Stop reason: HOSPADM

## 2023-04-03 RX ORDER — METHYLPREDNISOLONE SOD SUCC 125 MG
40 VIAL (EA) INJECTION
Status: CANCELLED | OUTPATIENT
Start: 2023-05-01

## 2023-04-03 RX ORDER — ACETAMINOPHEN 325 MG/1
650 TABLET ORAL
Status: ACTIVE | OUTPATIENT
Start: 2023-04-03 | End: 2023-04-03

## 2023-04-03 RX ORDER — SODIUM CHLORIDE 0.9 % (FLUSH) 0.9 %
10 SYRINGE (ML) INJECTION
Status: DISCONTINUED | OUTPATIENT
Start: 2023-04-03 | End: 2023-04-03 | Stop reason: HOSPADM

## 2023-04-03 RX ORDER — SODIUM CHLORIDE 0.9 % (FLUSH) 0.9 %
10 SYRINGE (ML) INJECTION
Status: CANCELLED | OUTPATIENT
Start: 2023-05-01

## 2023-04-03 RX ORDER — ACETAMINOPHEN 325 MG/1
650 TABLET ORAL
Status: CANCELLED | OUTPATIENT
Start: 2023-05-01

## 2023-04-03 RX ADMIN — VEDOLIZUMAB 300 MG: 300 INJECTION, POWDER, LYOPHILIZED, FOR SOLUTION INTRAVENOUS at 08:04

## 2023-04-03 RX ADMIN — SODIUM CHLORIDE: 9 INJECTION, SOLUTION INTRAVENOUS at 08:04

## 2023-04-05 ENCOUNTER — TELEPHONE (OUTPATIENT)
Dept: DIABETES | Facility: CLINIC | Age: 48
End: 2023-04-05
Payer: MEDICARE

## 2023-04-06 ENCOUNTER — HOSPITAL ENCOUNTER (OUTPATIENT)
Dept: RADIOLOGY | Facility: HOSPITAL | Age: 48
Discharge: HOME | End: 2023-04-06
Attending: STUDENT IN AN ORGANIZED HEALTH CARE EDUCATION/TRAINING PROGRAM
Payer: MEDICARE

## 2023-04-06 DIAGNOSIS — R74.8 ELEVATED LIVER ENZYMES: ICD-10-CM

## 2023-04-06 PROCEDURE — 76705 ECHO EXAM OF ABDOMEN: CPT | Mod: TC

## 2023-04-18 ENCOUNTER — TELEPHONE (OUTPATIENT)
Dept: DIABETES | Facility: CLINIC | Age: 48
End: 2023-04-18
Payer: MEDICARE

## 2023-05-02 ENCOUNTER — INFUSION (OUTPATIENT)
Dept: INFUSION THERAPY | Facility: HOSPITAL | Age: 48
End: 2023-05-02
Attending: INTERNAL MEDICINE
Payer: MEDICARE

## 2023-05-02 VITALS
HEIGHT: 70 IN | DIASTOLIC BLOOD PRESSURE: 90 MMHG | BODY MASS INDEX: 35.41 KG/M2 | WEIGHT: 247.31 LBS | SYSTOLIC BLOOD PRESSURE: 149 MMHG | HEART RATE: 84 BPM | TEMPERATURE: 98 F | RESPIRATION RATE: 20 BRPM

## 2023-05-02 DIAGNOSIS — K50.919 CROHN'S DISEASE WITH COMPLICATION, UNSPECIFIED GASTROINTESTINAL TRACT LOCATION: Primary | ICD-10-CM

## 2023-05-02 PROCEDURE — 25000003 PHARM REV CODE 250: Performed by: INTERNAL MEDICINE

## 2023-05-02 PROCEDURE — 96413 CHEMO IV INFUSION 1 HR: CPT

## 2023-05-02 PROCEDURE — 63600175 PHARM REV CODE 636 W HCPCS: Mod: JZ,JG | Performed by: INTERNAL MEDICINE

## 2023-05-02 RX ORDER — ACETAMINOPHEN 325 MG/1
650 TABLET ORAL
Status: CANCELLED | OUTPATIENT
Start: 2023-05-30

## 2023-05-02 RX ORDER — DIPHENHYDRAMINE HYDROCHLORIDE 50 MG/ML
25 INJECTION INTRAMUSCULAR; INTRAVENOUS EVERY 4 HOURS PRN
Status: CANCELLED | OUTPATIENT
Start: 2023-05-30

## 2023-05-02 RX ORDER — SODIUM CHLORIDE 0.9 % (FLUSH) 0.9 %
10 SYRINGE (ML) INJECTION
Status: CANCELLED | OUTPATIENT
Start: 2023-05-30

## 2023-05-02 RX ORDER — METHYLPREDNISOLONE SOD SUCC 125 MG
40 VIAL (EA) INJECTION
Status: CANCELLED | OUTPATIENT
Start: 2023-05-30

## 2023-05-02 RX ORDER — EPINEPHRINE 1 MG/ML
0.3 INJECTION, SOLUTION, CONCENTRATE INTRAVENOUS
Status: CANCELLED | OUTPATIENT
Start: 2023-05-30

## 2023-05-02 RX ORDER — EPINEPHRINE 0.3 MG/.3ML
0.3 INJECTION SUBCUTANEOUS
Status: CANCELLED
Start: 2023-05-02

## 2023-05-02 RX ORDER — HEPARIN 100 UNIT/ML
500 SYRINGE INTRAVENOUS
Status: DISCONTINUED | OUTPATIENT
Start: 2023-05-02 | End: 2023-05-02 | Stop reason: HOSPADM

## 2023-05-02 RX ORDER — HEPARIN 100 UNIT/ML
500 SYRINGE INTRAVENOUS
Status: CANCELLED | OUTPATIENT
Start: 2023-05-30

## 2023-05-02 RX ORDER — EPINEPHRINE 0.3 MG/.3ML
0.3 INJECTION SUBCUTANEOUS
Status: CANCELLED
Start: 2023-05-30

## 2023-05-02 RX ORDER — ACETAMINOPHEN 325 MG/1
650 TABLET ORAL
Status: ACTIVE | OUTPATIENT
Start: 2023-05-02 | End: 2023-05-02

## 2023-05-02 RX ORDER — SODIUM CHLORIDE 0.9 % (FLUSH) 0.9 %
10 SYRINGE (ML) INJECTION
Status: DISCONTINUED | OUTPATIENT
Start: 2023-05-02 | End: 2023-05-02 | Stop reason: HOSPADM

## 2023-05-02 RX ORDER — EPINEPHRINE 1 MG/ML
0.3 INJECTION, SOLUTION, CONCENTRATE INTRAVENOUS
Status: DISCONTINUED | OUTPATIENT
Start: 2023-05-02 | End: 2023-05-02

## 2023-05-02 RX ADMIN — VEDOLIZUMAB 300 MG: 300 INJECTION, POWDER, LYOPHILIZED, FOR SOLUTION INTRAVENOUS at 12:05

## 2023-05-03 ENCOUNTER — CLINICAL SUPPORT (OUTPATIENT)
Dept: DIABETES | Facility: CLINIC | Age: 48
End: 2023-05-03
Payer: MEDICARE

## 2023-05-03 VITALS — BODY MASS INDEX: 36.22 KG/M2 | WEIGHT: 253 LBS

## 2023-05-03 DIAGNOSIS — E11.9 TYPE 2 DIABETES MELLITUS WITHOUT COMPLICATION, WITHOUT LONG-TERM CURRENT USE OF INSULIN: ICD-10-CM

## 2023-05-03 DIAGNOSIS — R16.0 HEPATOMEGALY: ICD-10-CM

## 2023-05-03 PROCEDURE — G0108 DIAB MANAGE TRN  PER INDIV: HCPCS | Mod: PBBFAC,PN | Performed by: DIETITIAN, REGISTERED

## 2023-05-03 PROCEDURE — 99212 OFFICE O/P EST SF 10 MIN: CPT | Mod: PBBFAC,PN | Performed by: DIETITIAN, REGISTERED

## 2023-05-03 NOTE — PROGRESS NOTES
Diabetes Care Specialist Progress Note  Author: Trina Orellana RD  Date: 5/3/2023    Program Intake  Reason for Diabetes Program Visit:: Initial Diabetes Assessment  Current diabetes risk level:: low    Lab Results   Component Value Date    HGBA1C 8.7 (H) 03/27/2023       Clinical         Problem Review  Reviewed Problem List with Patient: yes  Active comorbidities affecting diabetes self-care.: yes  Reviewed health maintenance: yes    Clinical Assessment  Current Diabetes Treatment: Oral Medication, Injectable  Have you ever experienced hypoglycemia (low blood sugar)?: yes  Are you able to tell when your blood sugar is low?: Yes  What symptoms do you experience?: Shaky, Sweaty  Have you ever been hospitalized because your blood sugar was too low?: no  Have you ever experienced hyperglycemia (high blood sugar)?: yes  Are you able to tell when your blood sugar is high?: Yes  What are your symptoms?: thirst, blurry vision, fatigue    Medication Information  How do you obtain your medications?: Patient drives  Do you sometimes have difficulty refilling your medications?: Yes (see comment) (There is a delay from when he is out of his meds to when he receives from mail order pharmacy)  Medication adherence impacting ability to self-manage diabetes?: Yes    Labs  Do you have regular lab work to monitor your medications?: Yes  Lab Compliance Barriers: No    Nutritional Status  Diet: Regular  Meal Plan 24 Hour Recall: Lunch, Breakfast, Dinner  Meal Plan 24 Hour Recall - Breakfast: slimfast  Meal Plan 24 Hour Recall - Lunch: sandwich  Meal Plan 24 Hour Recall - Dinner: 1 cup rice, chicken and mushrooms. Drinks V8 Splash, milk, sometimes soda and juice  Change in appetite?: Yes  Recent Changes in Weight: No Recent Weight Change  Current nutritional status an area of need that is impacting patient's ability to self-manage diabetes?: No    Additional Social History    Support  Does anyone support you with your diabetes care?:  yes  Who supports you?: spouse  Who takes you to your medical appointments?: self  Does the current support meet the patient's needs?: Yes  Is Support an area impacting ability to self-manage diabetes?: No    Access to [employer] Media & Technology  Does the patient have access to any of the following devices or technologies?: Smart phone  Media or technology needs impacting ability to self-manage diabetes?: No    Cognitive/Behavioral Health  Alert and Oriented: Yes  Difficulty Thinking: No  Requires Prompting: No  Requires assistance for routine expression?: No  Cognitive or behavioral barriers impacting ability to self-manage diabetes?: No         Communication  Language preference: English  Hearing Problems: No  Vision Problems: Yes  Vision problem type:: Decreased Vision  Vision Assistance: Glasses  Communication needs impacting ability to self-manage diabetes?: No    Health Literacy  Preferred Learning Method: Face to Face  How often do you need to have someone help you read instructions, pamphlets, or written material from your doctor or pharmacy?: Never  Health literacy needs impacting ability to self-manage diabetes?: No      Diabetes Self-Management Skills Assessment    Diabetes Disease Process/Treatment Options  Patient/caregiver able to identify at least three signs and symptoms of diabetes.: yes  Identified signs and symptoms:: blurred vision, fatigue, increased thirst  Diabetes Disease Process/Treatment Options: Skills Assessment Completed: No  Deferred due to:: Time  Area of need?: Yes    Nutrition/Healthy Eating  Method of carbohydrate measurement:: no method  Patient can identify foods that impact blood sugar.: yes  Patient-identified foods:: fruit/fruit juice, starches (bread, pasta, rice, cereal), starchy vegetables (corn, peas, beans), sweets  Nutrition/Healthy Eating Skills Assessment Completed:: Yes  Assessment indicates:: Instruction Needed  Area of need?: Yes    Physical Activity/Exercise  Patient  formally exercises outside of work.: no  Physical Activity/Exercise Skills Assessment Completed: : Yes  Assessment indicates:: Instruction Needed  Area of need?: Yes    Medications  Patient is able to describe current diabetes management routine.: yes  Diabetes management routine:: injectable medications, oral medications  Patient is able to identify current diabetes medications, dosages, and appropriate timing of medications.: yes (Trulicity 1.5 mg weekly, Janumet 50/1000mg and Glipizide 10 mg BID)  Patient understands the purpose of the medications taken for diabetes.: no  Medication Skills Assessment Completed:: Yes  Assessment indicates:: Instruction Needed  Area of need?: Yes    Home Blood Glucose Monitoring  Patient states that blood sugar is checked at home daily.: no  Reasons for not monitoring:: equipment outdated  Home Blood Glucose Monitoring Skills Assessment Completed: : Yes  Assessment indicates:: Instruction Needed  Area of need?: Yes    Acute Complications  Patient is able to identify types of acute complications: Yes  Patient Identified:: Hypoglycemia  Patient can identify general symptoms of hypoglycemia: yes  Patient identified:: sweating, shakiness  Able to state proper treatment of hypoglycemia?: no (see comments)  Acute Complications Skills Assessment Completed: : Yes  Assessment indicates:: Instruction Needed  Area of need?: Yes    Chronic Complications  Patient can identify major chronic complications of diabetes.: no  Patient is taking statin?: Yes  Chronic Complications Skills Assessment Completed: : Yes  Assessment indicates:: Instruction Needed  Area of need?: Yes    Psychosocial/Coping  Psychosocial/Coping Skills Assessment Completed: : No  Deffered due to:: Time      Diabetes Self Support Plan         Assessment Summary and Plan    Based on today's diabetes care assessment, the following areas of need were identified:      Social 5/3/2023   Support No   Access to [employer] No    Cognitive/Behavioral Health No   Culture/Confucianism -   Communication No   Health Literacy No        Clinical 5/3/2023   Medication Adherence Yes   Lab Compliance No   Nutritional Status No        Diabetes Self-Management Skills 5/3/2023   Diabetes Disease Process/Treatment Options Yes - will address at follow-up   Nutrition/Healthy Eating Yes - Reviewed the sources and role of Carbohydrate, Protein, and Fat and how each nutrient impact blood sugar. Reviewed label reading for Total Carbohydrates and how sugars impact total carbohydrates. Reviewed serving sizes of starches, milk, fruit, starchy vegetables and snacks. Reviewed non-starchy vegetable list. Provided meal-planning instruction via foodlists, plate method and measuring cups. Discussed strategies for replacing sugary beverages with water and other noncaloric, sugar free options.  Provided sample menus and snack ideas.    Physical Activity/Exercise Yes - Reviewed glycemic benefits of activity. Pt's wife states that she will encourage dog walking.   Medication Yes - Provided review of current diabetes medication action, dosage, frequency, side effects, and storage guidelines. Discussed guidelines for preventing, detecting and treating hypoglycemia and hyperglycemia. Reviewed the importance of meal and medication timing with diabetes mediations for prevention of acute complications and maximum drug benefit.     Home Blood Glucose Monitoring Yes - Meter is > 1 year. He will request meter/supplies from provider. Provided glucose logs.   Acute Complications Yes - Discussed prevention, identification signs/symptoms and treatment of hyperglycemia and hypoglycemia and when to contact clinic.    Chronic Complications Yes - Reviewed diabetes complications and preventative screenings including annual dilated eye exams and daily foot self-exams. Reviewed foot care guidelines.   Discussed current A1c, Blood Pressure, and Cholesterol results (ABC's of Diabetes) and ADA  "target guidelines.    Psychosocial/Coping -          Today's interventions were provided through individual discussion, instruction, and written materials were provided.  Pt states that he is being evaluated for elevated liver enzymes. Reviewed healthy eating and limiting fatty foods. Encouraged fruit/non-starchy vegetable intake with low sodium. Currently wife cooks and prepares lean meats. He does eat sweets and drinks high carb beverages. Goal is to reduce carbs in beverages. He currently does not exercise. Will try walking the dog for exercise. C/o fatigue limiting his desire for activity. Hx of Chron's disease and ileostomy. Reports that it has been well-controlled and does alter his diet based on food intolerances. No seeds/nuts/insoluble fibers. Reports symptoms of hypoglycemia, infrequently. He does not check blood sugar to confirm. Reviewed "Rule of 15s" to treat. States he will consider asking provider for new meter as his is > 1 year old and his supplies are likely . Provided blood sugar log. Reviewed health maintenance plan, A1C goals, complications and carbohydrate education. Expressed concern over food insecurity. Provided information on [employer].    Patient verbalized understanding of instruction and written materials.  Pt was able to return back demonstration of instructions today. Patient understood key points, needs reinforcement and further instruction.     Diabetes Self-Management Care Plan:    Today's Diabetes Self-Management Care Plan was developed with Sheila's input. Sheila has agreed to work toward the following goal(s) to improve his/her overall diabetes control.      Care Plan: Diabetes Management   Updates made since 4/3/2023 12:00 AM        Problem: Blood Glucose Self-Monitoring Resolved 5/3/2023        Goal: Patient agrees to check and record blood sugars 2 times per day. Completed 5/3/2023   Start Date: 2022   This Visit's Progress: No change "   Priority: High   Barriers: Pain Associated with Finger Sticks        Problem: Healthy Eating         Goal: Avoid beverages with carbohydrates    Start Date: 5/3/2023   Priority: High        Task: Reviewed the sources and role of Carbohydrate, Protein, and Fat and how each nutrient impacts blood sugar. Completed 5/3/2023        Task: Explained how to count carbohydrates using the food label and the use of dry measuring cups for accurate carb counting. Completed 5/3/2023        Task: Recommended replacing beverages containing high sugar content with noncaloric/sugar free options and/or water. Completed 5/3/2023        Task: Review the importance of balancing carbohydrates with each meal using portion control techniques to count servings of carbohydrate and label reading to identify serving size and amount of total carbs per serving. Completed 5/3/2023          Follow Up Plan     Follow up if symptoms worsen or fail to improve. Pt will contact clinic for follow-up.    Today's care plan and follow up schedule was discussed with patient.  [place] verbalized understanding of the care plan, goals, and agrees to follow up plan.        The patient was encouraged to communicate with his/her health care provider/physician and care team regarding his/her condition(s) and treatment.  I provided the patient with my contact information today and encouraged to contact me via phone or [facility] Portal as needed.     Length of Visit   Total Time: 90 Minutes

## 2023-05-05 ENCOUNTER — PATIENT MESSAGE (OUTPATIENT)
Dept: FAMILY MEDICINE | Facility: CLINIC | Age: 48
End: 2023-05-05
Payer: MEDICARE

## 2023-05-05 DIAGNOSIS — R79.89 LOW TESTOSTERONE IN MALE: ICD-10-CM

## 2023-05-05 RX ORDER — TESTOSTERONE CYPIONATE 200 MG/ML
100 INJECTION, SOLUTION INTRAMUSCULAR
Qty: 5 ML | Refills: 2 | Status: SHIPPED | OUTPATIENT
Start: 2023-05-05 | End: 2023-06-28 | Stop reason: SDUPTHER

## 2023-05-05 NOTE — TELEPHONE ENCOUNTER
Refill Routing Note   Medication(s) are not appropriate for processing by [facility] for the following reason(s):      Medication outside of protocol    [facility] action(s):  Route          Medication reconciliation completed: No     Appointments  past 12m or future 3m with PCP    Date Provider   Last Visit   3/27/2023 Alena Mcqueen MD   Next Visit   6/27/2023 Alena Mcqueen MD   ED visits in past 90 days: 0        Note composed:8:46 AM 05/05/2023

## 2023-05-05 NOTE — TELEPHONE ENCOUNTER
No care due was identified.  [facility] Embedded Care Due Messages. Reference number: [id number].   5/05/2023 8:34:57 AM CDT

## 2023-05-16 ENCOUNTER — PATIENT MESSAGE (OUTPATIENT)
Dept: ADMINISTRATIVE | Facility: HOSPITAL | Age: 48
End: 2023-05-16
Payer: MEDICARE

## 2023-05-16 NOTE — TELEPHONE ENCOUNTER
No care due was identified.  [facility] Embedded Care Due Messages. Reference number: [id number].   5/15/2023 10:07:07 PM CDT

## 2023-05-16 NOTE — TELEPHONE ENCOUNTER
Refill Routing Note   Medication(s) are not appropriate for processing by [facility] for the following reason(s):      No active prescription written by PCP    [facility] action(s):  Defer None identified          Appointments  past 12m or future 3m with PCP    Date Provider   Last Visit   3/27/2023 Alena Mcqueen MD   Next Visit   6/27/2023 Alena Mcqueen MD   ED visits in past 90 days: 0        Note composed:7:36 AM 05/16/2023

## 2023-05-17 RX ORDER — LEVOTHYROXINE SODIUM 150 UG/1
TABLET ORAL
Qty: 90 TABLET | Refills: 3 | Status: SHIPPED | OUTPATIENT
Start: 2023-05-17 | End: 2024-01-30

## 2023-05-30 ENCOUNTER — INFUSION (OUTPATIENT)
Dept: INFUSION THERAPY | Facility: HOSPITAL | Age: 48
End: 2023-05-30
Attending: INTERNAL MEDICINE
Payer: MEDICARE

## 2023-05-30 VITALS
HEART RATE: 97 BPM | TEMPERATURE: 99 F | RESPIRATION RATE: 16 BRPM | DIASTOLIC BLOOD PRESSURE: 83 MMHG | SYSTOLIC BLOOD PRESSURE: 125 MMHG | WEIGHT: 252.81 LBS | BODY MASS INDEX: 36.19 KG/M2 | OXYGEN SATURATION: 97 %

## 2023-05-30 DIAGNOSIS — K50.919 CROHN'S DISEASE WITH COMPLICATION, UNSPECIFIED GASTROINTESTINAL TRACT LOCATION: Primary | ICD-10-CM

## 2023-05-30 PROCEDURE — 63600175 PHARM REV CODE 636 W HCPCS: Mod: JZ,JG | Performed by: INTERNAL MEDICINE

## 2023-05-30 PROCEDURE — 96365 THER/PROPH/DIAG IV INF INIT: CPT

## 2023-05-30 PROCEDURE — 25000003 PHARM REV CODE 250: Performed by: INTERNAL MEDICINE

## 2023-05-30 RX ORDER — METHYLPREDNISOLONE SOD SUCC 125 MG
40 VIAL (EA) INJECTION
Status: CANCELLED | OUTPATIENT
Start: 2023-06-27

## 2023-05-30 RX ORDER — ACETAMINOPHEN 325 MG/1
650 TABLET ORAL
Status: ACTIVE | OUTPATIENT
Start: 2023-05-30 | End: 2023-05-30

## 2023-05-30 RX ORDER — DIPHENHYDRAMINE HYDROCHLORIDE 50 MG/ML
25 INJECTION INTRAMUSCULAR; INTRAVENOUS EVERY 4 HOURS PRN
Status: DISCONTINUED | OUTPATIENT
Start: 2023-05-30 | End: 2023-05-30 | Stop reason: HOSPADM

## 2023-05-30 RX ORDER — EPINEPHRINE 0.3 MG/.3ML
0.3 INJECTION SUBCUTANEOUS
Status: CANCELLED
Start: 2023-06-27

## 2023-05-30 RX ORDER — HEPARIN 100 UNIT/ML
500 SYRINGE INTRAVENOUS
Status: DISCONTINUED | OUTPATIENT
Start: 2023-05-30 | End: 2023-05-30 | Stop reason: HOSPADM

## 2023-05-30 RX ORDER — DIPHENHYDRAMINE HYDROCHLORIDE 50 MG/ML
25 INJECTION INTRAMUSCULAR; INTRAVENOUS EVERY 4 HOURS PRN
Status: CANCELLED | OUTPATIENT
Start: 2023-06-27

## 2023-05-30 RX ORDER — HEPARIN 100 UNIT/ML
500 SYRINGE INTRAVENOUS
Status: CANCELLED | OUTPATIENT
Start: 2023-06-27

## 2023-05-30 RX ORDER — ACETAMINOPHEN 325 MG/1
650 TABLET ORAL
Status: CANCELLED | OUTPATIENT
Start: 2023-06-27

## 2023-05-30 RX ORDER — SODIUM CHLORIDE 0.9 % (FLUSH) 0.9 %
10 SYRINGE (ML) INJECTION
Status: CANCELLED | OUTPATIENT
Start: 2023-06-27

## 2023-05-30 RX ORDER — SODIUM CHLORIDE 0.9 % (FLUSH) 0.9 %
10 SYRINGE (ML) INJECTION
Status: DISCONTINUED | OUTPATIENT
Start: 2023-05-30 | End: 2023-05-30 | Stop reason: HOSPADM

## 2023-05-30 RX ADMIN — VEDOLIZUMAB 300 MG: 300 INJECTION, POWDER, LYOPHILIZED, FOR SOLUTION INTRAVENOUS at 01:05

## 2023-05-30 NOTE — NURSING
IV catheter discontinued intact. Site without signs and symptoms of complications. Dressing and pressure applied.  No complaints. Pt tolerated treatment with no complaints.

## 2023-06-06 ENCOUNTER — PATIENT MESSAGE (OUTPATIENT)
Dept: ADMINISTRATIVE | Facility: HOSPITAL | Age: 48
End: 2023-06-06
Payer: MEDICARE

## 2023-06-27 ENCOUNTER — INFUSION (OUTPATIENT)
Dept: INFUSION THERAPY | Facility: HOSPITAL | Age: 48
End: 2023-06-27
Attending: INTERNAL MEDICINE
Payer: MEDICARE

## 2023-06-27 ENCOUNTER — OFFICE VISIT (OUTPATIENT)
Dept: FAMILY MEDICINE | Facility: CLINIC | Age: 48
End: 2023-06-27
Payer: MEDICARE

## 2023-06-27 VITALS
SYSTOLIC BLOOD PRESSURE: 138 MMHG | OXYGEN SATURATION: 99 % | WEIGHT: 254.81 LBS | DIASTOLIC BLOOD PRESSURE: 86 MMHG | BODY MASS INDEX: 36.48 KG/M2 | TEMPERATURE: 98 F | HEART RATE: 88 BPM

## 2023-06-27 VITALS
BODY MASS INDEX: 36.64 KG/M2 | SYSTOLIC BLOOD PRESSURE: 136 MMHG | WEIGHT: 255.88 LBS | TEMPERATURE: 99 F | DIASTOLIC BLOOD PRESSURE: 82 MMHG | HEART RATE: 71 BPM | OXYGEN SATURATION: 100 %

## 2023-06-27 DIAGNOSIS — R79.89 LOW TESTOSTERONE IN MALE: ICD-10-CM

## 2023-06-27 DIAGNOSIS — R79.89 LOW TESTOSTERONE: ICD-10-CM

## 2023-06-27 DIAGNOSIS — K50.919 CROHN'S DISEASE WITH COMPLICATION, UNSPECIFIED GASTROINTESTINAL TRACT LOCATION: Primary | ICD-10-CM

## 2023-06-27 DIAGNOSIS — E03.4 HYPOTHYROIDISM DUE TO ACQUIRED ATROPHY OF THYROID: ICD-10-CM

## 2023-06-27 DIAGNOSIS — E11.9 TYPE 2 DIABETES MELLITUS WITHOUT COMPLICATION, WITHOUT LONG-TERM CURRENT USE OF INSULIN: Primary | ICD-10-CM

## 2023-06-27 LAB
CREAT UR-MCNC: 183.8 MG/DL (ref 63–166)
MICROALBUMIN UR-MCNC: 19.9 UG/ML
MICROALBUMIN/CREAT RATIO PNL UR: 10.8 MG/GM CR (ref 0–30)

## 2023-06-27 PROCEDURE — 3008F BODY MASS INDEX DOCD: CPT | Mod: CPTII,,, | Performed by: STUDENT IN AN ORGANIZED HEALTH CARE EDUCATION/TRAINING PROGRAM

## 2023-06-27 PROCEDURE — A4216 STERILE WATER/SALINE, 10 ML: HCPCS | Performed by: INTERNAL MEDICINE

## 2023-06-27 PROCEDURE — 99214 PR OFFICE/OUTPT VISIT, EST, LEVL IV, 30-39 MIN: ICD-10-PCS | Mod: S$PBB,,, | Performed by: STUDENT IN AN ORGANIZED HEALTH CARE EDUCATION/TRAINING PROGRAM

## 2023-06-27 PROCEDURE — 63600175 PHARM REV CODE 636 W HCPCS: Mod: JZ,JG | Performed by: INTERNAL MEDICINE

## 2023-06-27 PROCEDURE — 96365 THER/PROPH/DIAG IV INF INIT: CPT

## 2023-06-27 PROCEDURE — 3066F PR DOCUMENTATION OF TREATMENT FOR NEPHROPATHY: ICD-10-PCS | Mod: CPTII,,, | Performed by: STUDENT IN AN ORGANIZED HEALTH CARE EDUCATION/TRAINING PROGRAM

## 2023-06-27 PROCEDURE — 3079F DIAST BP 80-89 MM HG: CPT | Mod: CPTII,,, | Performed by: STUDENT IN AN ORGANIZED HEALTH CARE EDUCATION/TRAINING PROGRAM

## 2023-06-27 PROCEDURE — 82043 UR ALBUMIN QUANTITATIVE: CPT | Performed by: STUDENT IN AN ORGANIZED HEALTH CARE EDUCATION/TRAINING PROGRAM

## 2023-06-27 PROCEDURE — 25000003 PHARM REV CODE 250: Performed by: INTERNAL MEDICINE

## 2023-06-27 PROCEDURE — 3008F PR BODY MASS INDEX (BMI) DOCUMENTED: ICD-10-PCS | Mod: CPTII,,, | Performed by: STUDENT IN AN ORGANIZED HEALTH CARE EDUCATION/TRAINING PROGRAM

## 2023-06-27 PROCEDURE — 99214 OFFICE O/P EST MOD 30 MIN: CPT | Mod: S$PBB,,, | Performed by: STUDENT IN AN ORGANIZED HEALTH CARE EDUCATION/TRAINING PROGRAM

## 2023-06-27 PROCEDURE — 99214 OFFICE O/P EST MOD 30 MIN: CPT | Mod: PBBFAC,25,PN | Performed by: STUDENT IN AN ORGANIZED HEALTH CARE EDUCATION/TRAINING PROGRAM

## 2023-06-27 PROCEDURE — 1159F PR MEDICATION LIST DOCUMENTED IN MEDICAL RECORD: ICD-10-PCS | Mod: CPTII,,, | Performed by: STUDENT IN AN ORGANIZED HEALTH CARE EDUCATION/TRAINING PROGRAM

## 2023-06-27 PROCEDURE — 3061F PR NEG MICROALBUMINURIA RESULT DOCUMENTED/REVIEW: ICD-10-PCS | Mod: CPTII,,, | Performed by: STUDENT IN AN ORGANIZED HEALTH CARE EDUCATION/TRAINING PROGRAM

## 2023-06-27 PROCEDURE — 3061F NEG MICROALBUMINURIA REV: CPT | Mod: CPTII,,, | Performed by: STUDENT IN AN ORGANIZED HEALTH CARE EDUCATION/TRAINING PROGRAM

## 2023-06-27 PROCEDURE — 3075F SYST BP GE 130 - 139MM HG: CPT | Mod: CPTII,,, | Performed by: STUDENT IN AN ORGANIZED HEALTH CARE EDUCATION/TRAINING PROGRAM

## 2023-06-27 PROCEDURE — 1159F MED LIST DOCD IN RCRD: CPT | Mod: CPTII,,, | Performed by: STUDENT IN AN ORGANIZED HEALTH CARE EDUCATION/TRAINING PROGRAM

## 2023-06-27 PROCEDURE — 3075F PR MOST RECENT SYSTOLIC BLOOD PRESS GE 130-139MM HG: ICD-10-PCS | Mod: CPTII,,, | Performed by: STUDENT IN AN ORGANIZED HEALTH CARE EDUCATION/TRAINING PROGRAM

## 2023-06-27 PROCEDURE — 3079F PR MOST RECENT DIASTOLIC BLOOD PRESSURE 80-89 MM HG: ICD-10-PCS | Mod: CPTII,,, | Performed by: STUDENT IN AN ORGANIZED HEALTH CARE EDUCATION/TRAINING PROGRAM

## 2023-06-27 PROCEDURE — 3066F NEPHROPATHY DOC TX: CPT | Mod: CPTII,,, | Performed by: STUDENT IN AN ORGANIZED HEALTH CARE EDUCATION/TRAINING PROGRAM

## 2023-06-27 RX ORDER — EPINEPHRINE 0.3 MG/.3ML
0.3 INJECTION SUBCUTANEOUS
Status: CANCELLED
Start: 2023-07-25

## 2023-06-27 RX ORDER — ACETAMINOPHEN 325 MG/1
650 TABLET ORAL
Status: CANCELLED | OUTPATIENT
Start: 2023-07-25

## 2023-06-27 RX ORDER — METHYLPREDNISOLONE SOD SUCC 125 MG
40 VIAL (EA) INJECTION
Status: ACTIVE | OUTPATIENT
Start: 2023-06-27 | End: 2023-06-27

## 2023-06-27 RX ORDER — INSULIN PUMP SYRINGE, 3 ML
EACH MISCELLANEOUS
Qty: 1 EACH | Refills: 0 | Status: SHIPPED | OUTPATIENT
Start: 2023-06-27 | End: 2024-06-26

## 2023-06-27 RX ORDER — SEMAGLUTIDE 0.68 MG/ML
0.5 INJECTION, SOLUTION SUBCUTANEOUS
Qty: 3 ML | Refills: 11 | Status: SHIPPED | OUTPATIENT
Start: 2023-06-27 | End: 2023-09-28

## 2023-06-27 RX ORDER — HEPARIN 100 UNIT/ML
500 SYRINGE INTRAVENOUS
Status: DISCONTINUED | OUTPATIENT
Start: 2023-06-27 | End: 2023-06-27 | Stop reason: HOSPADM

## 2023-06-27 RX ORDER — DIPHENHYDRAMINE HYDROCHLORIDE 50 MG/ML
25 INJECTION INTRAMUSCULAR; INTRAVENOUS EVERY 4 HOURS PRN
Status: DISCONTINUED | OUTPATIENT
Start: 2023-06-27 | End: 2023-06-27 | Stop reason: HOSPADM

## 2023-06-27 RX ORDER — METHYLPREDNISOLONE SOD SUCC 125 MG
40 VIAL (EA) INJECTION
Status: CANCELLED | OUTPATIENT
Start: 2023-07-25

## 2023-06-27 RX ORDER — SODIUM CHLORIDE 0.9 % (FLUSH) 0.9 %
10 SYRINGE (ML) INJECTION
Status: DISCONTINUED | OUTPATIENT
Start: 2023-06-27 | End: 2023-06-27 | Stop reason: HOSPADM

## 2023-06-27 RX ORDER — DIPHENHYDRAMINE HYDROCHLORIDE 50 MG/ML
25 INJECTION INTRAMUSCULAR; INTRAVENOUS EVERY 4 HOURS PRN
Status: CANCELLED | OUTPATIENT
Start: 2023-07-25

## 2023-06-27 RX ORDER — HEPARIN 100 UNIT/ML
500 SYRINGE INTRAVENOUS
Status: CANCELLED | OUTPATIENT
Start: 2023-07-25

## 2023-06-27 RX ORDER — SODIUM CHLORIDE 0.9 % (FLUSH) 0.9 %
10 SYRINGE (ML) INJECTION
Status: CANCELLED | OUTPATIENT
Start: 2023-07-25

## 2023-06-27 RX ORDER — ACETAMINOPHEN 325 MG/1
650 TABLET ORAL
Status: ACTIVE | OUTPATIENT
Start: 2023-06-27 | End: 2023-06-27

## 2023-06-27 RX ADMIN — Medication 10 ML: at 01:06

## 2023-06-27 RX ADMIN — VEDOLIZUMAB 300 MG: 300 INJECTION, POWDER, LYOPHILIZED, FOR SOLUTION INTRAVENOUS at 12:06

## 2023-06-27 RX ADMIN — SODIUM CHLORIDE: 9 INJECTION, SOLUTION INTRAVENOUS at 12:06

## 2023-06-28 RX ORDER — TESTOSTERONE CYPIONATE 200 MG/ML
100 INJECTION, SOLUTION INTRAMUSCULAR
Qty: 5 ML | Refills: 2 | Status: SHIPPED | OUTPATIENT
Start: 2023-06-28 | End: 2023-09-29 | Stop reason: SDUPTHER

## 2023-06-28 NOTE — PROGRESS NOTES
Patient Name: Sheila Hamilton   : 1975  MRN: [id number]     Subjective:   Patient ID: Sheila Hamilton is a 47 y.o. male.    Chief Complaint:   No chief complaint on file.       HPI: HPI  Patient was seen nurse practitioner Jalil moved away BLANCA has been signed  Patient is still receiving Testosterone injections at clinic in [place]. States that they are more convenient than for him to come here.   Patient lives in [place]    Crohn's disease  sees Dr. Trent has infusion every month at [facility] has an ileostomy  States he does not need any medical supplies at this time but his PCP was the one who ordered all of his ostomy supplies for him  Protonix 40 meq daily    Elevated Liver Enzymes  Noted previously. Also states his cardiologist asked him to discuss this with PCP  Hep panel negative 2022  Patient reports going to his gastrointestinal doctor who asked him to lose weight to help his elevated liver enzymes secondary to fatty liver disease  Reports that he was asked if he could switch  His diabetes medication to Ozempic to assist with weight loss      Hypothyroidism  takes levothyroxine 125 mcg   TSH and T4 WNL  Needs repeat today    diabetes  A1c 6.8   week is on glipizide 10 mg twice daily  Janumet  mg twice daily  Trulicty 0.75 mg weekly; asking for switch to Ozempic  Does not check blood sugar at home ; asking for meter    Anxiety  Sees a psychiatrist who has prescribed diazepam 10 mg 3 times daily  Bupropion 150 mg twice daily  Trileptal 300 mg twice daily  Trazodone 50 mg at bedtime  Bsupar 5 mg     Reactive airway disease  Uses albuterol as needed  Also uses Flonase twice daily for allergies    Hypertension  Was on lisinopril 20 mg stopped in 2022  States that this was recently changed however he does not know the name of the new medication until it comes in the mail  Also takes potassium chloride 20 mEq a day    History of stent placement in legs  sees  [facility]  Takes clopidogrel 75 mg daily    HLD  Rosuvastatin 10 at bedtime    Low testosterone  testosterone 200 mg injections Patient reports that he takes every 14 days week.  Long discussion as patient receiving these injections from provider in [place]. Does not manage just gives injections.  States that the drive here is too far. Has been getting them weekly but reports missing several due to hospitalizations.   Last testosterone level not within normal limits will need repeat today          ROS:  Review of Systems   Constitutional:  Negative for chills and fever.   HENT:  Negative for sore throat.    Respiratory:  Negative for shortness of breath and wheezing.    Cardiovascular:  Negative for chest pain, palpitations and leg swelling.   Gastrointestinal:  Negative for constipation, diarrhea and heartburn.   Genitourinary:  Negative for frequency and urgency.   Musculoskeletal:  Negative for back pain and myalgias.   Skin:  Negative for itching and rash.   Neurological:  Negative for dizziness, focal weakness and headaches.   Psychiatric/Behavioral:  The patient is not nervous/anxious.     History:   History reviewed. No pertinent past medical history.   Past Surgical History:   Procedure Laterality Date    CHOLECYSTECTOMY      HERNIA REPAIR      ILEOSTOMY Left      History reviewed. No pertinent family history.   Social History     Tobacco Use    Smoking status: Never     Passive exposure: Never    Smokeless tobacco: Never   Substance and Sexual Activity    Alcohol use: Not Currently    Drug use: Never    Sexual activity: Yes     Partners: Female        Allergies:   Review of patient's allergies indicates:   Allergen Reactions    Bee venom protein (honey bee)     Insect venom      Objective:     Vitals:    06/27/23 0905 06/27/23 0908   BP: (!) 145/87 138/86   Pulse: 86 88   Temp: 98.4 °F (36.9 °C)    SpO2: 99%    Weight: 115.6 kg (254 lb 12.8 oz)      Body mass index is 36.48 kg/m².     Physical Examination:    Physical Exam  Constitutional:       General: He is not in acute distress.  Eyes:      General: No scleral icterus.     Extraocular Movements: Extraocular movements intact.      Conjunctiva/sclera: Conjunctivae normal.      Pupils: Pupils are equal, round, and reactive to light.   Cardiovascular:      Rate and Rhythm: Normal rate and regular rhythm.      Heart sounds: No murmur heard.  Pulmonary:      Effort: Pulmonary effort is normal. No respiratory distress.      Breath sounds: No stridor. No wheezing or rhonchi.   Abdominal:      General: Bowel sounds are normal. There is no distension.      Palpations: Abdomen is soft.      Tenderness: There is no abdominal tenderness. There is no guarding.   Musculoskeletal:         General: No swelling. Normal range of motion.   Skin:     General: Skin is warm and dry.      Coloration: Skin is not jaundiced.      Findings: No rash.   Neurological:      Mental Status: He is alert.      Gait: Gait normal.   Psychiatric:         Thought Content: Thought content normal.       Assessment:     1. Type 2 diabetes mellitus without complication, without long-term current use of insulin    2. Hypothyroidism due to acquired atrophy of thyroid    3. Low testosterone        Plan:     Problem List Items Addressed This Visit          Endocrine    Type 2 diabetes mellitus without complication, without long-term current use of insulin - Primary    Overview     A1c today   Glipizide,10 mg   Ordering CBG meter for patient    Janumet 50/1000  Stopping Trulicity and adding Ozempic to assist with weight loss will start Ozempic at 0.5 mg         Relevant Medications    semaglutide (OZEMPIC) 0.25 mg or 0.5 mg (2 mg/3 mL) pen injector    Other Relevant Orders    Microalbumin/Creatinine Ratio, Urine (Completed)    Hemoglobin A1C (Completed)    Low testosterone    Overview     Testosterone level today   Refill prescription for testosterone         Hypothyroidism due to acquired atrophy of thyroid     Overview     Continue synthroid 150 mg  TSH and T4 today          Relevant Orders    T4, Free (Completed)    TSH (Completed)    Rep[place]. Asked patient to call with name of eye doctor. States he may have macular degeneration.   Problem List Items Addressed This Visit          Endocrine    Type 2 diabetes mellitus without complication, without long-term current use of insulin - Primary    Overview     A1c today   Glipizide,10 mg   Ordering CBG meter for patient    Janumet 50/1000  Stopping Trulicity and adding Ozempic to assist with weight loss will start Ozempic at 0.5 mg         Relevant Medications    semaglutide (OZEMPIC) 0.25 mg or 0.5 mg (2 mg/3 mL) pen injector    Other Relevant Orders    Microalbumin/Creatinine Ratio, Urine (Completed)    Hemoglobin A1C (Completed)    Low testosterone    Overview     Testosterone level today   Refill prescription for testosterone         Hypothyroidism due to acquired atrophy of thyroid    Overview     Continue synthroid 150 mg  TSH and T4 today          Relevant Orders    T4, Free (Completed)    TSH (Completed)      Follow up in about 3 months (around 9/27/2023).

## 2023-07-26 ENCOUNTER — INFUSION (OUTPATIENT)
Dept: INFUSION THERAPY | Facility: HOSPITAL | Age: 48
End: 2023-07-26
Attending: INTERNAL MEDICINE
Payer: MEDICARE

## 2023-07-26 VITALS
HEART RATE: 89 BPM | WEIGHT: 251.63 LBS | HEIGHT: 70 IN | SYSTOLIC BLOOD PRESSURE: 132 MMHG | OXYGEN SATURATION: 98 % | BODY MASS INDEX: 36.02 KG/M2 | RESPIRATION RATE: 16 BRPM | DIASTOLIC BLOOD PRESSURE: 83 MMHG | TEMPERATURE: 99 F

## 2023-07-26 DIAGNOSIS — K50.919 CROHN'S DISEASE WITH COMPLICATION, UNSPECIFIED GASTROINTESTINAL TRACT LOCATION: Primary | ICD-10-CM

## 2023-07-26 PROCEDURE — 25000003 PHARM REV CODE 250: Performed by: INTERNAL MEDICINE

## 2023-07-26 PROCEDURE — 96365 THER/PROPH/DIAG IV INF INIT: CPT

## 2023-07-26 PROCEDURE — 63600175 PHARM REV CODE 636 W HCPCS: Mod: JZ,JG | Performed by: INTERNAL MEDICINE

## 2023-07-26 RX ORDER — ACETAMINOPHEN 325 MG/1
650 TABLET ORAL
Status: CANCELLED | OUTPATIENT
Start: 2023-08-23

## 2023-07-26 RX ORDER — DIPHENHYDRAMINE HYDROCHLORIDE 50 MG/ML
25 INJECTION INTRAMUSCULAR; INTRAVENOUS EVERY 4 HOURS PRN
Status: CANCELLED | OUTPATIENT
Start: 2023-08-23

## 2023-07-26 RX ORDER — EPINEPHRINE 0.3 MG/.3ML
0.3 INJECTION SUBCUTANEOUS
Status: CANCELLED
Start: 2023-08-23

## 2023-07-26 RX ORDER — METHYLPREDNISOLONE SOD SUCC 125 MG
40 VIAL (EA) INJECTION
Status: CANCELLED | OUTPATIENT
Start: 2023-08-23

## 2023-07-26 RX ORDER — HEPARIN 100 UNIT/ML
500 SYRINGE INTRAVENOUS
Status: DISCONTINUED | OUTPATIENT
Start: 2023-07-26 | End: 2023-07-26 | Stop reason: HOSPADM

## 2023-07-26 RX ORDER — SODIUM CHLORIDE 0.9 % (FLUSH) 0.9 %
10 SYRINGE (ML) INJECTION
Status: DISCONTINUED | OUTPATIENT
Start: 2023-07-26 | End: 2023-07-26 | Stop reason: HOSPADM

## 2023-07-26 RX ORDER — DIPHENHYDRAMINE HYDROCHLORIDE 50 MG/ML
25 INJECTION INTRAMUSCULAR; INTRAVENOUS EVERY 4 HOURS PRN
Status: DISCONTINUED | OUTPATIENT
Start: 2023-07-26 | End: 2023-07-26 | Stop reason: HOSPADM

## 2023-07-26 RX ORDER — HEPARIN 100 UNIT/ML
500 SYRINGE INTRAVENOUS
Status: CANCELLED | OUTPATIENT
Start: 2023-08-23

## 2023-07-26 RX ORDER — ACETAMINOPHEN 325 MG/1
650 TABLET ORAL
Status: ACTIVE | OUTPATIENT
Start: 2023-07-26 | End: 2023-07-26

## 2023-07-26 RX ORDER — SODIUM CHLORIDE 0.9 % (FLUSH) 0.9 %
10 SYRINGE (ML) INJECTION
Status: CANCELLED | OUTPATIENT
Start: 2023-08-23

## 2023-07-26 RX ADMIN — VEDOLIZUMAB 300 MG: 300 INJECTION, POWDER, LYOPHILIZED, FOR SOLUTION INTRAVENOUS at 12:07

## 2023-07-26 RX ADMIN — SODIUM CHLORIDE: 9 INJECTION, SOLUTION INTRAVENOUS at 12:07

## 2023-08-23 ENCOUNTER — INFUSION (OUTPATIENT)
Dept: INFUSION THERAPY | Facility: HOSPITAL | Age: 48
End: 2023-08-23
Attending: INTERNAL MEDICINE
Payer: MEDICARE

## 2023-08-23 VITALS
HEART RATE: 84 BPM | RESPIRATION RATE: 16 BRPM | TEMPERATURE: 99 F | WEIGHT: 254.5 LBS | OXYGEN SATURATION: 97 % | SYSTOLIC BLOOD PRESSURE: 126 MMHG | DIASTOLIC BLOOD PRESSURE: 78 MMHG | BODY MASS INDEX: 36.43 KG/M2 | HEIGHT: 70 IN

## 2023-08-23 DIAGNOSIS — K50.919 CROHN'S DISEASE WITH COMPLICATION, UNSPECIFIED GASTROINTESTINAL TRACT LOCATION: Primary | ICD-10-CM

## 2023-08-23 PROCEDURE — 25000003 PHARM REV CODE 250: Performed by: INTERNAL MEDICINE

## 2023-08-23 PROCEDURE — 63600175 PHARM REV CODE 636 W HCPCS: Mod: JZ,JG | Performed by: INTERNAL MEDICINE

## 2023-08-23 PROCEDURE — 96365 THER/PROPH/DIAG IV INF INIT: CPT

## 2023-08-23 RX ORDER — METHYLPREDNISOLONE SOD SUCC 125 MG
40 VIAL (EA) INJECTION
Status: CANCELLED | OUTPATIENT
Start: 2023-09-20

## 2023-08-23 RX ORDER — HEPARIN 100 UNIT/ML
500 SYRINGE INTRAVENOUS
Status: CANCELLED | OUTPATIENT
Start: 2023-09-20

## 2023-08-23 RX ORDER — DIPHENHYDRAMINE HYDROCHLORIDE 50 MG/ML
25 INJECTION INTRAMUSCULAR; INTRAVENOUS EVERY 4 HOURS PRN
Status: CANCELLED | OUTPATIENT
Start: 2023-09-20

## 2023-08-23 RX ORDER — DIPHENHYDRAMINE HYDROCHLORIDE 50 MG/ML
25 INJECTION INTRAMUSCULAR; INTRAVENOUS EVERY 4 HOURS PRN
Status: DISCONTINUED | OUTPATIENT
Start: 2023-08-23 | End: 2023-08-23 | Stop reason: HOSPADM

## 2023-08-23 RX ORDER — SODIUM CHLORIDE 0.9 % (FLUSH) 0.9 %
10 SYRINGE (ML) INJECTION
Status: CANCELLED | OUTPATIENT
Start: 2023-09-20

## 2023-08-23 RX ORDER — EPINEPHRINE 0.3 MG/.3ML
0.3 INJECTION SUBCUTANEOUS
Status: CANCELLED
Start: 2023-09-20

## 2023-08-23 RX ORDER — ACETAMINOPHEN 325 MG/1
650 TABLET ORAL
Status: ACTIVE | OUTPATIENT
Start: 2023-08-23 | End: 2023-08-23

## 2023-08-23 RX ORDER — SODIUM CHLORIDE 0.9 % (FLUSH) 0.9 %
10 SYRINGE (ML) INJECTION
Status: DISCONTINUED | OUTPATIENT
Start: 2023-08-23 | End: 2023-08-23 | Stop reason: HOSPADM

## 2023-08-23 RX ORDER — HEPARIN 100 UNIT/ML
500 SYRINGE INTRAVENOUS
Status: DISCONTINUED | OUTPATIENT
Start: 2023-08-23 | End: 2023-08-23 | Stop reason: HOSPADM

## 2023-08-23 RX ORDER — ACETAMINOPHEN 325 MG/1
650 TABLET ORAL
Status: CANCELLED | OUTPATIENT
Start: 2023-09-20

## 2023-08-23 RX ADMIN — SODIUM CHLORIDE: 9 INJECTION, SOLUTION INTRAVENOUS at 01:08

## 2023-08-23 RX ADMIN — VEDOLIZUMAB 300 MG: 300 INJECTION, POWDER, LYOPHILIZED, FOR SOLUTION INTRAVENOUS at 01:08

## 2023-08-23 NOTE — PLAN OF CARE
Entyvio q4w given. Tolerated well. Next appt made and given to wife. Dc'd home in stable condition.

## 2023-09-22 ENCOUNTER — INFUSION (OUTPATIENT)
Dept: INFUSION THERAPY | Facility: HOSPITAL | Age: 48
End: 2023-09-22
Attending: INTERNAL MEDICINE
Payer: MEDICARE

## 2023-09-22 VITALS
RESPIRATION RATE: 16 BRPM | HEIGHT: 70 IN | BODY MASS INDEX: 35.65 KG/M2 | OXYGEN SATURATION: 95 % | DIASTOLIC BLOOD PRESSURE: 81 MMHG | SYSTOLIC BLOOD PRESSURE: 138 MMHG | WEIGHT: 249 LBS | TEMPERATURE: 99 F | HEART RATE: 77 BPM

## 2023-09-22 DIAGNOSIS — K50.919 CROHN'S DISEASE WITH COMPLICATION, UNSPECIFIED GASTROINTESTINAL TRACT LOCATION: Primary | ICD-10-CM

## 2023-09-22 PROCEDURE — 96365 THER/PROPH/DIAG IV INF INIT: CPT

## 2023-09-22 PROCEDURE — 25000003 PHARM REV CODE 250: Performed by: INTERNAL MEDICINE

## 2023-09-22 PROCEDURE — 63600175 PHARM REV CODE 636 W HCPCS: Mod: JZ,JG | Performed by: INTERNAL MEDICINE

## 2023-09-22 RX ORDER — ACETAMINOPHEN 325 MG/1
650 TABLET ORAL
Status: CANCELLED | OUTPATIENT
Start: 2023-10-20

## 2023-09-22 RX ORDER — DIPHENHYDRAMINE HYDROCHLORIDE 50 MG/ML
25 INJECTION INTRAMUSCULAR; INTRAVENOUS EVERY 4 HOURS PRN
Status: CANCELLED | OUTPATIENT
Start: 2023-10-20

## 2023-09-22 RX ORDER — ACETAMINOPHEN 325 MG/1
650 TABLET ORAL
Status: ACTIVE | OUTPATIENT
Start: 2023-09-22 | End: 2023-09-22

## 2023-09-22 RX ORDER — HEPARIN 100 UNIT/ML
500 SYRINGE INTRAVENOUS
Status: DISCONTINUED | OUTPATIENT
Start: 2023-09-22 | End: 2023-09-22 | Stop reason: HOSPADM

## 2023-09-22 RX ORDER — EPINEPHRINE 0.3 MG/.3ML
0.3 INJECTION SUBCUTANEOUS
Status: CANCELLED
Start: 2023-10-20

## 2023-09-22 RX ORDER — SODIUM CHLORIDE 0.9 % (FLUSH) 0.9 %
10 SYRINGE (ML) INJECTION
Status: CANCELLED | OUTPATIENT
Start: 2023-10-20

## 2023-09-22 RX ORDER — HEPARIN 100 UNIT/ML
500 SYRINGE INTRAVENOUS
Status: CANCELLED | OUTPATIENT
Start: 2023-10-20

## 2023-09-22 RX ORDER — METHYLPREDNISOLONE SOD SUCC 125 MG
40 VIAL (EA) INJECTION
Status: CANCELLED | OUTPATIENT
Start: 2023-10-20

## 2023-09-22 RX ORDER — SODIUM CHLORIDE 0.9 % (FLUSH) 0.9 %
10 SYRINGE (ML) INJECTION
Status: DISCONTINUED | OUTPATIENT
Start: 2023-09-22 | End: 2023-09-22 | Stop reason: HOSPADM

## 2023-09-22 RX ORDER — DIPHENHYDRAMINE HYDROCHLORIDE 50 MG/ML
25 INJECTION INTRAMUSCULAR; INTRAVENOUS EVERY 4 HOURS PRN
Status: DISCONTINUED | OUTPATIENT
Start: 2023-09-22 | End: 2023-09-22 | Stop reason: HOSPADM

## 2023-09-22 RX ADMIN — SODIUM CHLORIDE: 9 INJECTION, SOLUTION INTRAVENOUS at 08:09

## 2023-09-22 RX ADMIN — VEDOLIZUMAB 300 MG: 300 INJECTION, POWDER, LYOPHILIZED, FOR SOLUTION INTRAVENOUS at 08:09

## 2023-09-22 NOTE — PLAN OF CARE
Entyvio given.  Tolerated well.  No further appointments made.  Message sent to schedulers.  He knows to call if he does not get his next appointment .

## 2023-09-28 ENCOUNTER — PATIENT MESSAGE (OUTPATIENT)
Dept: FAMILY MEDICINE | Facility: CLINIC | Age: 48
End: 2023-09-28
Payer: MEDICARE

## 2023-09-28 ENCOUNTER — OFFICE VISIT (OUTPATIENT)
Dept: FAMILY MEDICINE | Facility: CLINIC | Age: 48
End: 2023-09-28
Payer: MEDICARE

## 2023-09-28 VITALS
HEART RATE: 84 BPM | TEMPERATURE: 99 F | HEIGHT: 70 IN | SYSTOLIC BLOOD PRESSURE: 129 MMHG | OXYGEN SATURATION: 96 % | WEIGHT: 249.5 LBS | DIASTOLIC BLOOD PRESSURE: 85 MMHG | RESPIRATION RATE: 20 BRPM | BODY MASS INDEX: 35.72 KG/M2

## 2023-09-28 DIAGNOSIS — Z12.5 PROSTATE CANCER SCREENING: ICD-10-CM

## 2023-09-28 DIAGNOSIS — E11.9 TYPE 2 DIABETES MELLITUS WITHOUT COMPLICATION, WITHOUT LONG-TERM CURRENT USE OF INSULIN: ICD-10-CM

## 2023-09-28 DIAGNOSIS — R79.89 LOW TESTOSTERONE IN MALE: ICD-10-CM

## 2023-09-28 DIAGNOSIS — R79.89 LOW TESTOSTERONE: ICD-10-CM

## 2023-09-28 DIAGNOSIS — N30.00 ACUTE CYSTITIS WITHOUT HEMATURIA: ICD-10-CM

## 2023-09-28 DIAGNOSIS — R30.0 DYSURIA: ICD-10-CM

## 2023-09-28 DIAGNOSIS — R16.0 HEPATOMEGALY: ICD-10-CM

## 2023-09-28 DIAGNOSIS — R39.9 LOWER URINARY TRACT SYMPTOMS (LUTS): ICD-10-CM

## 2023-09-28 DIAGNOSIS — E03.4 HYPOTHYROIDISM DUE TO ACQUIRED ATROPHY OF THYROID: ICD-10-CM

## 2023-09-28 DIAGNOSIS — Z23 FLU VACCINE NEED: Primary | ICD-10-CM

## 2023-09-28 DIAGNOSIS — N30.00 ACUTE CYSTITIS WITHOUT HEMATURIA: Primary | ICD-10-CM

## 2023-09-28 LAB
APPEARANCE UR: ABNORMAL
BACTERIA #/AREA URNS AUTO: ABNORMAL /HPF
BILIRUB UR QL STRIP.AUTO: NEGATIVE
COLOR UR AUTO: YELLOW
EST. AVERAGE GLUCOSE BLD GHB EST-MCNC: 157.1 MG/DL
GLUCOSE UR QL STRIP.AUTO: ABNORMAL
HBA1C MFR BLD: 7.1 %
HYALINE CASTS #/AREA URNS LPF: ABNORMAL /LPF
KETONES UR QL STRIP.AUTO: ABNORMAL
LEUKOCYTE ESTERASE UR QL STRIP.AUTO: 500
MUCOUS THREADS URNS QL MICRO: ABNORMAL /LPF
NITRITE UR QL STRIP.AUTO: ABNORMAL
PH UR STRIP.AUTO: 5.5 [PH]
PROT UR QL STRIP.AUTO: ABNORMAL
PSA SERPL-MCNC: 1.81 NG/ML
RBC #/AREA URNS AUTO: ABNORMAL /HPF
RBC UR QL AUTO: NEGATIVE
SP GR UR STRIP.AUTO: 1.02 (ref 1–1.03)
SQUAMOUS #/AREA URNS LPF: ABNORMAL /HPF
T4 FREE SERPL-MCNC: 1.04 NG/DL (ref 0.7–1.48)
TESTOST SERPL-MCNC: 259.51 NG/DL (ref 240.24–870.68)
TSH SERPL-ACNC: 0.46 UIU/ML (ref 0.35–4.94)
UROBILINOGEN UR STRIP-ACNC: NORMAL
WBC #/AREA URNS AUTO: >100 /HPF

## 2023-09-28 PROCEDURE — 84443 ASSAY THYROID STIM HORMONE: CPT | Performed by: STUDENT IN AN ORGANIZED HEALTH CARE EDUCATION/TRAINING PROGRAM

## 2023-09-28 PROCEDURE — 1159F MED LIST DOCD IN RCRD: CPT | Mod: CPTII,,, | Performed by: STUDENT IN AN ORGANIZED HEALTH CARE EDUCATION/TRAINING PROGRAM

## 2023-09-28 PROCEDURE — 84153 ASSAY OF PSA TOTAL: CPT | Performed by: STUDENT IN AN ORGANIZED HEALTH CARE EDUCATION/TRAINING PROGRAM

## 2023-09-28 PROCEDURE — 3051F HG A1C>EQUAL 7.0%<8.0%: CPT | Mod: CPTII,,, | Performed by: STUDENT IN AN ORGANIZED HEALTH CARE EDUCATION/TRAINING PROGRAM

## 2023-09-28 PROCEDURE — 84403 ASSAY OF TOTAL TESTOSTERONE: CPT | Performed by: STUDENT IN AN ORGANIZED HEALTH CARE EDUCATION/TRAINING PROGRAM

## 2023-09-28 PROCEDURE — 3061F NEG MICROALBUMINURIA REV: CPT | Mod: CPTII,,, | Performed by: STUDENT IN AN ORGANIZED HEALTH CARE EDUCATION/TRAINING PROGRAM

## 2023-09-28 PROCEDURE — G0008 ADMIN INFLUENZA VIRUS VAC: HCPCS | Mod: PBBFAC,PN

## 2023-09-28 PROCEDURE — 87186 SC STD MICRODIL/AGAR DIL: CPT | Performed by: STUDENT IN AN ORGANIZED HEALTH CARE EDUCATION/TRAINING PROGRAM

## 2023-09-28 PROCEDURE — 3066F PR DOCUMENTATION OF TREATMENT FOR NEPHROPATHY: ICD-10-PCS | Mod: CPTII,,, | Performed by: STUDENT IN AN ORGANIZED HEALTH CARE EDUCATION/TRAINING PROGRAM

## 2023-09-28 PROCEDURE — 84439 ASSAY OF FREE THYROXINE: CPT | Performed by: STUDENT IN AN ORGANIZED HEALTH CARE EDUCATION/TRAINING PROGRAM

## 2023-09-28 PROCEDURE — 87088 URINE BACTERIA CULTURE: CPT | Performed by: STUDENT IN AN ORGANIZED HEALTH CARE EDUCATION/TRAINING PROGRAM

## 2023-09-28 PROCEDURE — 3074F SYST BP LT 130 MM HG: CPT | Mod: CPTII,,, | Performed by: STUDENT IN AN ORGANIZED HEALTH CARE EDUCATION/TRAINING PROGRAM

## 2023-09-28 PROCEDURE — 3066F NEPHROPATHY DOC TX: CPT | Mod: CPTII,,, | Performed by: STUDENT IN AN ORGANIZED HEALTH CARE EDUCATION/TRAINING PROGRAM

## 2023-09-28 PROCEDURE — 3061F PR NEG MICROALBUMINURIA RESULT DOCUMENTED/REVIEW: ICD-10-PCS | Mod: CPTII,,, | Performed by: STUDENT IN AN ORGANIZED HEALTH CARE EDUCATION/TRAINING PROGRAM

## 2023-09-28 PROCEDURE — 36415 COLL VENOUS BLD VENIPUNCTURE: CPT | Performed by: STUDENT IN AN ORGANIZED HEALTH CARE EDUCATION/TRAINING PROGRAM

## 2023-09-28 PROCEDURE — 3051F PR MOST RECENT HEMOGLOBIN A1C LEVEL 7.0 - < 8.0%: ICD-10-PCS | Mod: CPTII,,, | Performed by: STUDENT IN AN ORGANIZED HEALTH CARE EDUCATION/TRAINING PROGRAM

## 2023-09-28 PROCEDURE — 81001 URINALYSIS AUTO W/SCOPE: CPT | Performed by: STUDENT IN AN ORGANIZED HEALTH CARE EDUCATION/TRAINING PROGRAM

## 2023-09-28 PROCEDURE — 83036 HEMOGLOBIN GLYCOSYLATED A1C: CPT | Performed by: STUDENT IN AN ORGANIZED HEALTH CARE EDUCATION/TRAINING PROGRAM

## 2023-09-28 PROCEDURE — 3079F DIAST BP 80-89 MM HG: CPT | Mod: CPTII,,, | Performed by: STUDENT IN AN ORGANIZED HEALTH CARE EDUCATION/TRAINING PROGRAM

## 2023-09-28 PROCEDURE — 99214 OFFICE O/P EST MOD 30 MIN: CPT | Mod: S$PBB,,, | Performed by: STUDENT IN AN ORGANIZED HEALTH CARE EDUCATION/TRAINING PROGRAM

## 2023-09-28 PROCEDURE — 90686 IIV4 VACC NO PRSV 0.5 ML IM: CPT | Mod: PBBFAC,PN

## 2023-09-28 PROCEDURE — 3008F PR BODY MASS INDEX (BMI) DOCUMENTED: ICD-10-PCS | Mod: CPTII,,, | Performed by: STUDENT IN AN ORGANIZED HEALTH CARE EDUCATION/TRAINING PROGRAM

## 2023-09-28 PROCEDURE — 3074F PR MOST RECENT SYSTOLIC BLOOD PRESSURE < 130 MM HG: ICD-10-PCS | Mod: CPTII,,, | Performed by: STUDENT IN AN ORGANIZED HEALTH CARE EDUCATION/TRAINING PROGRAM

## 2023-09-28 PROCEDURE — 99215 OFFICE O/P EST HI 40 MIN: CPT | Mod: PBBFAC,PN | Performed by: STUDENT IN AN ORGANIZED HEALTH CARE EDUCATION/TRAINING PROGRAM

## 2023-09-28 PROCEDURE — 3079F PR MOST RECENT DIASTOLIC BLOOD PRESSURE 80-89 MM HG: ICD-10-PCS | Mod: CPTII,,, | Performed by: STUDENT IN AN ORGANIZED HEALTH CARE EDUCATION/TRAINING PROGRAM

## 2023-09-28 PROCEDURE — 99214 PR OFFICE/OUTPT VISIT, EST, LEVL IV, 30-39 MIN: ICD-10-PCS | Mod: S$PBB,,, | Performed by: STUDENT IN AN ORGANIZED HEALTH CARE EDUCATION/TRAINING PROGRAM

## 2023-09-28 PROCEDURE — 1159F PR MEDICATION LIST DOCUMENTED IN MEDICAL RECORD: ICD-10-PCS | Mod: CPTII,,, | Performed by: STUDENT IN AN ORGANIZED HEALTH CARE EDUCATION/TRAINING PROGRAM

## 2023-09-28 PROCEDURE — 3008F BODY MASS INDEX DOCD: CPT | Mod: CPTII,,, | Performed by: STUDENT IN AN ORGANIZED HEALTH CARE EDUCATION/TRAINING PROGRAM

## 2023-09-28 RX ORDER — NITROFURANTOIN 25; 75 MG/1; MG/1
100 CAPSULE ORAL 2 TIMES DAILY
Qty: 14 CAPSULE | Refills: 0 | Status: SHIPPED | OUTPATIENT
Start: 2023-09-28 | End: 2023-10-06

## 2023-09-28 RX ORDER — ROSUVASTATIN CALCIUM 10 MG/1
10 TABLET, COATED ORAL NIGHTLY
Qty: 90 TABLET | Refills: 3 | Status: SHIPPED | OUTPATIENT
Start: 2023-09-28

## 2023-09-28 RX ORDER — SEMAGLUTIDE 1.34 MG/ML
1 INJECTION, SOLUTION SUBCUTANEOUS
Qty: 3 ML | Refills: 11 | Status: SHIPPED | OUTPATIENT
Start: 2023-09-28 | End: 2024-09-27

## 2023-09-28 RX ORDER — SEMAGLUTIDE 0.68 MG/ML
0.5 INJECTION, SOLUTION SUBCUTANEOUS
Qty: 3 ML | Refills: 11 | Status: SHIPPED | OUTPATIENT
Start: 2023-09-28 | End: 2024-01-30

## 2023-09-28 RX ADMIN — INFLUENZA VIRUS VACCINE 0.5 ML: 15; 15; 15; 15 SUSPENSION INTRAMUSCULAR at 10:09

## 2023-09-29 PROBLEM — N30.00 ACUTE CYSTITIS WITHOUT HEMATURIA: Status: ACTIVE | Noted: 2023-09-29

## 2023-09-29 RX ORDER — TESTOSTERONE CYPIONATE 200 MG/ML
100 INJECTION, SOLUTION INTRAMUSCULAR
Qty: 5 ML | Refills: 2 | Status: SHIPPED | OUTPATIENT
Start: 2023-09-29

## 2023-09-29 NOTE — ASSESSMENT & PLAN NOTE
A1c 7.1. Is decreasing  .  Stopping Glipizide,10 mg   Continue   Janumet 50/1000  Increasing Ozempic to 1 mg weekly

## 2023-09-29 NOTE — ASSESSMENT & PLAN NOTE
Referred patient to [facility] for discussion of hepatic steatosis  Encouraged patient to discuss with his GI doctor  States diet is fatty   Hep panel negative 6/2022  Encourage continued weight loss with Ozempic

## 2023-09-29 NOTE — PROGRESS NOTES
Patient Name: Sheila Hamilton     : 1975    MRN: [id number]     Subjective:     Patient ID: Sheila Hamilton is a 47 y.o. male.    Chief Complaint:   Chief Complaint   Patient presents with    Follow-up     C/o burning upon urination  x1wk, Frequent trips to the bathroom,.        HPI: HPI    Acute issue today   Patient is describing dysuria symptoms with a pressure feeling in his bladder states that this has been going on for several days believes he may have urinary tract infection    Crohn's disease  sees Dr. Trent has infusion every month at [facility] has an ileostomy  States he does not need any medical supplies at this time but his PCP was the one who ordered all of his ostomy supplies for him  Protonix 40 meq daily    Elevated Liver Enzymes  Noted previously. Also states his cardiologist asked him to discuss this with PCP  Hep panel negative 2022  Patient reports going to his gastrointestinal doctor who asked him to lose weight to help his elevated liver enzymes secondary to fatty liver disease      Hypothyroidism  takes levothyroxine 125 mcg   TSH and T4 WNL  Needs repeat today    diabetes  A1c 7.3   glipizide 10 mg twice daily  Janumet  mg twice daily  Ozempic 0.5 mg qweek    Anxiety  Sees a psychiatrist who has prescribed diazepam 10 mg 3 times daily  Bupropion 150 mg twice daily  Trileptal 300 mg twice daily  Trazodone 50 mg at bedtime  Bsupar 5 mg     Reactive airway disease  Uses albuterol as needed  Also uses Flonase twice daily for allergies    Hypertension  Was on lisinopril 20 mg stopped in 2022  Is not currently on BP med. Will need to discuss further    History of stent placement in legs  sees [facility]    HLD  Rosuvastatin 10 at bedtime    Low testosterone  testosterone 200 mg injections Patient reports that he takes every 14 days week.  Long discussion as patient receiving these injections from provider in [place]. Does not manage just gives injections.  S      ROS:  Review of Systems   Constitutional:  Negative for chills and fever.   HENT:  Negative for sore throat.    ROS:      ROS     12 point review of systems conducted, negative except as stated in the history of present illness. See HPI for details.    History:     History reviewed. No pertinent past medical history.     Past Surgical History:   Procedure Laterality Date    CHOLECYSTECTOMY      HERNIA REPAIR      ILEOSTOMY Left        Family History   Problem Relation Age of Onset    Cancer Mother         Social History     Tobacco Use    Smoking status: Never     Passive exposure: Never    Smokeless tobacco: Never   Substance and Sexual Activity    Alcohol use: Not Currently    Drug use: Never    Sexual activity: Yes     Partners: Female       Current Outpatient Medications   Medication Instructions    albuterol (PROVENTIL/VENTOLIN HFA) 90 mcg/actuation inhaler No dose, route, or frequency recorded.    ARIPiprazole (ABILIFY) 10 mg, Oral, Daily    azelastine (ASTELIN) 137 mcg (0.1 %) nasal spray No dose, route, or frequency recorded.    blood sugar diagnostic Strp 100 each, Misc.(Non-Drug; Combo Route), 3 times daily    blood-glucose meter kit Use as instructed    buPROPion (WELLBUTRIN SR) 150 mg, Oral, 2 times daily    busPIRone (BUSPAR) 7.5 mg, Oral, 3 times daily    cetirizine (ZYRTEC) 10 mg, Oral    clopidogreL (PLAVIX) 75 mg, Oral    dicyclomine (BENTYL) 20 mg, Oral, 3 times daily    EScitalopram oxalate (LEXAPRO) 10 mg, Oral    fluconazole (DIFLUCAN) 150 MG Tab No dose, route, or frequency recorded.    fluconazole (DIFLUCAN) 150 MG Tab Take one tablet by mouth today and one in three days    fluticasone propionate (FLONASE) 50 mcg/actuation nasal spray 2 sprays, Nasal    furosemide (LASIX) 40 mg, Oral, Daily    HYDROcodone-acetaminophen (NORCO) 5-325 mg per tablet 1 tablet, Oral, Every 4 hours PRN    levothyroxine (SYNTHROID) 150 MCG tablet TAKE 1 TABLET BY MOUTH  DAILY ON AN EMPTY STOMACH     "nitrofurantoin, macrocrystal-monohydrate, (MACROBID) 100 MG capsule 100 mg, Oral, 2 times daily    ondansetron (ZOFRAN-ODT) 4 mg, Oral, Every 8 hours PRN    OXcarbazepine (TRILEPTAL) 300 mg, Oral, 2 times daily    oxyCODONE-acetaminophen (PERCOCET)  mg per tablet 1 tablet, Oral, Every 4 hours PRN    OZEMPIC 1 mg, Subcutaneous, Every 7 days    OZEMPIC 0.5 mg, Subcutaneous, Every 7 days    pantoprazole (PROTONIX) 40 mg, Oral    potassium chloride (MICRO-K) 10 MEQ CpSR 20 mEq, Oral    promethazine (PHENERGAN) 25 mg, Oral, Every 6 hours PRN    rosuvastatin (CRESTOR) 10 mg, Oral, Nightly    SITagliptan-metformin (JANUMET) 50-1,000 mg per tablet 1 tablet, Oral, 2 times daily with meals    testosterone cypionate (DEPOTESTOTERONE CYPIONATE) 100 mg, Intramuscular, Every 14 days    traZODone (DESYREL) 100 mg, Oral        Review of patient's allergies indicates:   Allergen Reactions    Bee venom protein (honey bee)     Insect venom        Objective:     Visit Vitals  /85 (BP Location: Right arm, Patient Position: Sitting, BP Method: Large (Automatic))   Pulse 84   Temp 98.6 °F (37 °C) (Oral)   Resp 20   Ht 5' 10" (1.778 m)   Wt 113.2 kg (249 lb 8 oz)   SpO2 96%   BMI 35.80 kg/m²       Physical Examination:     Physical Exam  Constitutional:       General: He is not in acute distress.  Eyes:      General: No scleral icterus.     Extraocular Movements: Extraocular movements intact.      Conjunctiva/sclera: Conjunctivae normal.      Pupils: Pupils are equal, round, and reactive to light.   Cardiovascular:      Rate and Rhythm: Normal rate and regular rhythm.      Heart sounds: No murmur heard.  Pulmonary:      Effort: Pulmonary effort is normal. No respiratory distress.      Breath sounds: No stridor. No wheezing or rhonchi.   Abdominal:      Comments: Ileostomy bag in place   Musculoskeletal:         General: No swelling. Normal range of motion.   Skin:     General: Skin is warm and dry.      Coloration: Skin is not " jaundiced.      Findings: No rash.   Neurological:      Mental Status: He is alert.      Gait: Gait normal.   Psychiatric:         Thought Content: Thought content normal.         Lab Results:     Chemistry:  Lab Results   Component Value Date     (L) 03/27/2023    K 4.5 03/27/2023    CHLORIDE 104 03/27/2023    BUN 19.2 03/27/2023    CREATININE 1.47 (H) 03/27/2023    EGFRNORACEVR 60 03/27/2023    GLUCOSE 287 (H) 03/27/2023    CALCIUM 9.9 03/27/2023    ALKPHOS 81 03/27/2023    LABPROT 8.9 (H) 03/27/2023    ALBUMIN 4.6 03/27/2023    BILIDIR 0.4 04/22/2022    IBILI 0.60 04/22/2022    AST 58 (H) 03/27/2023    ALT 86 (H) 03/27/2023    TSH 0.463 09/28/2023    MLLLBK2ZHST 1.04 09/28/2023    PSA 1.81 09/28/2023        Lab Results   Component Value Date    HGBA1C 7.1 (H) 09/28/2023        Hematology:  Lab Results   Component Value Date    WBC 10.6 03/27/2023    HGB 14.4 03/27/2023    HCT 43.8 03/27/2023     03/27/2023       Lipid Panel:  Lab Results   Component Value Date    CHOL 124 10/25/2022    HDL 32 (L) 10/25/2022    LDL 57.00 10/25/2022    TRIG 177 (H) 10/25/2022    TOTALCHOLEST 4 10/25/2022        Urine:  Lab Results   Component Value Date    COLORUA Yellow 09/28/2023    APPEARANCEUA Turbid (A) 09/28/2023    SGUA 1.021 09/28/2023    PHUA 5.5 09/28/2023    PROTEINUA Trace (A) 09/28/2023    GLUCOSEUA 4+ (A) 09/28/2023    KETONESUA Trace (A) 09/28/2023    BLOODUA Negative 09/28/2023    NITRITESUA 2+ (A) 09/28/2023    LEUKOCYTESUR 500 (A) 09/28/2023    RBCUA 0-5 09/28/2023    WBCUA >100 (A) 09/28/2023    BACTERIA Trace (A) 09/28/2023    SQEPUA Trace (A) 09/28/2023    HYALINECASTS None Seen 09/28/2023    CREATRANDUR 183.8 (H) 06/27/2023        Assessment:          ICD-10-CM ICD-9-CM   1. Flu vaccine need  Z23 V04.81   2. Low testosterone  R79.89 790.99   3. Hypothyroidism due to acquired atrophy of thyroid  E03.4 244.8     246.8   4. Type 2 diabetes mellitus without complication, without long-term current use  of insulin  E11.9 250.00   5. Dysuria  R30.0 788.1   6. Prostate cancer screening  Z12.5 V76.44   7. Lower urinary tract symptoms (LUTS)  R39.9 788.99   8. Hepatomegaly  R16.0 789.1   9. Acute cystitis without hematuria  N30.00 595.0   10. Low testosterone in male  R79.89 790.99        Plan:     1. Flu vaccine need  -     influenza (QUADRIVALENT PF) vaccine 0.5 mL    2. Low testosterone  Assessment & Plan:  Testosterone within normal limits will continue current testosterone regimen    Orders:  -     Testosterone; Future; Expected date: 09/28/2023    3. Hypothyroidism due to acquired atrophy of thyroid  Assessment & Plan:  Continue synthroid 150 mg  TSH and T4 today       Orders:  -     TSH; Future; Expected date: 09/28/2023  -     T4, Free; Future; Expected date: 09/28/2023    4. Type 2 diabetes mellitus without complication, without long-term current use of insulin  Assessment & Plan:  A1c 7.1. Is decreasing  .  Stopping Glipizide,10 mg   Continue   Janumet 50/1000  Increasing Ozempic to 1 mg weekly    Orders:  -     Hemoglobin A1C; Future; Expected date: 09/28/2023  -     semaglutide (OZEMPIC) 1 mg/dose (4 mg/3 mL); Inject 1 mg into the skin every 7 days.  Dispense: 3 mL; Refill: 11    5. Dysuria  -     Urinalysis, Reflex to Urine Culture  -     Urine culture    6. Prostate cancer screening  -     PSA, Screening; Future; Expected date: 09/28/2023    7. Lower urinary tract symptoms (LUTS)  -     PSA, Screening; Future; Expected date: 09/28/2023    8. Hepatomegaly  Assessment & Plan:  Referred patient to [place] for discussion of hepatic steatosis  Encouraged patient to discuss with his GI doctor  States diet is fatty   Hep panel negative 6/2022  Encourage continued weight loss with Ozempic        9. Acute cystitis without hematuria  Assessment & Plan:  Placing patient on on nitrofurantoin PSA is within normal limits will discuss next visit      10. Low testosterone in male  -     testosterone cypionate (DEPOTESTOTERONE  CYPIONATE) 200 mg/mL injection; Inject 0.5 mLs (100 mg total) into the muscle every 14 (fourteen) days.  Dispense: 5 mL; Refill: 2         Follow up in about 3 months (around 12/28/2023) for a1c.    Future Appointments   Date Time Provider Department Center   10/20/2023  9:00 AM CHAIR 13, [facility]   11/17/2023  9:00 AM CHAIR 10, [facility]   12/15/2023  9:00 AM CHAIR 10, [facility]   12/28/2023 10:00 AM Alena Mcqueen MD [facility]        Alena Mcqueen MD

## 2023-09-30 LAB — BACTERIA UR CULT: ABNORMAL

## 2023-10-06 ENCOUNTER — PATIENT MESSAGE (OUTPATIENT)
Dept: FAMILY MEDICINE | Facility: CLINIC | Age: 48
End: 2023-10-06
Payer: MEDICARE

## 2023-10-06 DIAGNOSIS — N30.00 ACUTE CYSTITIS WITHOUT HEMATURIA: Primary | ICD-10-CM

## 2023-10-06 RX ORDER — SULFAMETHOXAZOLE AND TRIMETHOPRIM 800; 160 MG/1; MG/1
1 TABLET ORAL 2 TIMES DAILY
Qty: 28 TABLET | Refills: 0 | Status: SHIPPED | OUTPATIENT
Start: 2023-10-06 | End: 2023-10-20

## 2023-10-20 ENCOUNTER — INFUSION (OUTPATIENT)
Dept: INFUSION THERAPY | Facility: HOSPITAL | Age: 48
End: 2023-10-20
Attending: INTERNAL MEDICINE
Payer: MEDICARE

## 2023-10-20 VITALS
SYSTOLIC BLOOD PRESSURE: 142 MMHG | BODY MASS INDEX: 35.53 KG/M2 | DIASTOLIC BLOOD PRESSURE: 85 MMHG | HEART RATE: 77 BPM | HEIGHT: 70 IN | RESPIRATION RATE: 16 BRPM | OXYGEN SATURATION: 97 % | TEMPERATURE: 99 F | WEIGHT: 248.19 LBS

## 2023-10-20 DIAGNOSIS — K50.919 CROHN'S DISEASE WITH COMPLICATION, UNSPECIFIED GASTROINTESTINAL TRACT LOCATION: Primary | ICD-10-CM

## 2023-10-20 PROCEDURE — 63600175 PHARM REV CODE 636 W HCPCS: Mod: JZ,JG | Performed by: INTERNAL MEDICINE

## 2023-10-20 PROCEDURE — 25000003 PHARM REV CODE 250: Performed by: INTERNAL MEDICINE

## 2023-10-20 PROCEDURE — 96365 THER/PROPH/DIAG IV INF INIT: CPT

## 2023-10-20 RX ORDER — EPINEPHRINE 0.3 MG/.3ML
0.3 INJECTION SUBCUTANEOUS
Status: CANCELLED
Start: 2023-11-17

## 2023-10-20 RX ORDER — DIPHENHYDRAMINE HYDROCHLORIDE 50 MG/ML
25 INJECTION INTRAMUSCULAR; INTRAVENOUS EVERY 4 HOURS PRN
Status: CANCELLED | OUTPATIENT
Start: 2023-11-17

## 2023-10-20 RX ORDER — DIPHENHYDRAMINE HYDROCHLORIDE 50 MG/ML
25 INJECTION INTRAMUSCULAR; INTRAVENOUS EVERY 4 HOURS PRN
Status: DISCONTINUED | OUTPATIENT
Start: 2023-10-20 | End: 2023-10-20 | Stop reason: HOSPADM

## 2023-10-20 RX ORDER — HEPARIN 100 UNIT/ML
500 SYRINGE INTRAVENOUS
Status: DISCONTINUED | OUTPATIENT
Start: 2023-10-20 | End: 2023-10-20 | Stop reason: HOSPADM

## 2023-10-20 RX ORDER — SODIUM CHLORIDE 0.9 % (FLUSH) 0.9 %
10 SYRINGE (ML) INJECTION
Status: DISCONTINUED | OUTPATIENT
Start: 2023-10-20 | End: 2023-10-20 | Stop reason: HOSPADM

## 2023-10-20 RX ORDER — ACETAMINOPHEN 325 MG/1
650 TABLET ORAL
Status: ACTIVE | OUTPATIENT
Start: 2023-10-20 | End: 2023-10-20

## 2023-10-20 RX ORDER — ACETAMINOPHEN 325 MG/1
650 TABLET ORAL
Status: CANCELLED | OUTPATIENT
Start: 2023-11-17

## 2023-10-20 RX ORDER — METHYLPREDNISOLONE SOD SUCC 125 MG
40 VIAL (EA) INJECTION
Status: CANCELLED | OUTPATIENT
Start: 2023-11-17

## 2023-10-20 RX ORDER — SODIUM CHLORIDE 0.9 % (FLUSH) 0.9 %
10 SYRINGE (ML) INJECTION
Status: CANCELLED | OUTPATIENT
Start: 2023-11-17

## 2023-10-20 RX ORDER — HEPARIN 100 UNIT/ML
500 SYRINGE INTRAVENOUS
Status: CANCELLED | OUTPATIENT
Start: 2023-11-17

## 2023-10-20 RX ADMIN — SODIUM CHLORIDE: 9 INJECTION, SOLUTION INTRAVENOUS at 09:10

## 2023-10-20 RX ADMIN — VEDOLIZUMAB 300 MG: 300 INJECTION, POWDER, LYOPHILIZED, FOR SOLUTION INTRAVENOUS at 09:10

## 2023-11-14 NOTE — TELEPHONE ENCOUNTER
Care Due:                  Date            Visit Type   Department     Provider  --------------------------------------------------------------------------------                                EP -                              [facility]  Last Visit: 09-      CARE ([facility])   CHIRAG Mcqueen                              EP -                              PRIMARY      [facility]  Next Visit: 12-      CARE ([facility])   CHIRAG Mcqueen                                                            Last  Test          Frequency    Reason                     Performed    Due Date  --------------------------------------------------------------------------------    Lipid Panel.  12 months..  rosuvastatin.............  10-   10-    [employer] Embedded Care Due Messages. Reference number: [id number].   11/14/2023 9:26:22 AM CST

## 2023-11-14 NOTE — TELEPHONE ENCOUNTER
Refill Routing Note   Medication(s) are not appropriate for processing by [facility] for the following reason(s):        No active prescription written by provider    [facility] action(s):  Defer     Requires labs : Yes             Appointments  past 12m or future 3m with PCP    Date Provider   Last Visit   9/28/2023 Alena Mcqueen MD   Next Visit   12/28/2023 Alena Mcqueen MD   ED visits in past 90 days: 0        Note composed:11:11 AM 11/14/2023

## 2023-11-17 ENCOUNTER — INFUSION (OUTPATIENT)
Dept: INFUSION THERAPY | Facility: HOSPITAL | Age: 48
End: 2023-11-17
Attending: INTERNAL MEDICINE
Payer: MEDICARE

## 2023-11-17 VITALS — OXYGEN SATURATION: 98 % | DIASTOLIC BLOOD PRESSURE: 91 MMHG | HEART RATE: 83 BPM | SYSTOLIC BLOOD PRESSURE: 152 MMHG

## 2023-11-17 DIAGNOSIS — K50.919 CROHN'S DISEASE WITH COMPLICATION, UNSPECIFIED GASTROINTESTINAL TRACT LOCATION: Primary | ICD-10-CM

## 2023-11-17 PROCEDURE — 96365 THER/PROPH/DIAG IV INF INIT: CPT

## 2023-11-17 PROCEDURE — 63600175 PHARM REV CODE 636 W HCPCS: Mod: JZ,JG | Performed by: INTERNAL MEDICINE

## 2023-11-17 PROCEDURE — 25000003 PHARM REV CODE 250: Performed by: INTERNAL MEDICINE

## 2023-11-17 RX ORDER — DIPHENHYDRAMINE HYDROCHLORIDE 50 MG/ML
25 INJECTION INTRAMUSCULAR; INTRAVENOUS EVERY 4 HOURS PRN
Status: CANCELLED | OUTPATIENT
Start: 2023-12-15

## 2023-11-17 RX ORDER — EPINEPHRINE 0.3 MG/.3ML
0.3 INJECTION SUBCUTANEOUS
Status: CANCELLED
Start: 2023-12-15

## 2023-11-17 RX ORDER — HEPARIN 100 UNIT/ML
500 SYRINGE INTRAVENOUS
Status: DISCONTINUED | OUTPATIENT
Start: 2023-11-17 | End: 2023-11-17 | Stop reason: HOSPADM

## 2023-11-17 RX ORDER — METHYLPREDNISOLONE SOD SUCC 125 MG
40 VIAL (EA) INJECTION
Status: CANCELLED | OUTPATIENT
Start: 2023-12-15

## 2023-11-17 RX ORDER — ACETAMINOPHEN 325 MG/1
650 TABLET ORAL
Status: CANCELLED | OUTPATIENT
Start: 2023-12-15

## 2023-11-17 RX ORDER — PANTOPRAZOLE SODIUM 40 MG/1
40 TABLET, DELAYED RELEASE ORAL DAILY
Qty: 90 TABLET | Refills: 3 | Status: SHIPPED | OUTPATIENT
Start: 2023-11-17

## 2023-11-17 RX ORDER — SODIUM CHLORIDE 0.9 % (FLUSH) 0.9 %
10 SYRINGE (ML) INJECTION
Status: CANCELLED | OUTPATIENT
Start: 2023-12-15

## 2023-11-17 RX ORDER — DIPHENHYDRAMINE HYDROCHLORIDE 50 MG/ML
25 INJECTION INTRAMUSCULAR; INTRAVENOUS EVERY 4 HOURS PRN
Status: DISCONTINUED | OUTPATIENT
Start: 2023-11-17 | End: 2023-11-17 | Stop reason: HOSPADM

## 2023-11-17 RX ORDER — SODIUM CHLORIDE 0.9 % (FLUSH) 0.9 %
10 SYRINGE (ML) INJECTION
Status: DISCONTINUED | OUTPATIENT
Start: 2023-11-17 | End: 2023-11-17 | Stop reason: HOSPADM

## 2023-11-17 RX ORDER — ACETAMINOPHEN 325 MG/1
650 TABLET ORAL
Status: ACTIVE | OUTPATIENT
Start: 2023-11-17 | End: 2023-11-17

## 2023-11-17 RX ORDER — HEPARIN 100 UNIT/ML
500 SYRINGE INTRAVENOUS
Status: CANCELLED | OUTPATIENT
Start: 2023-12-15

## 2023-11-17 RX ADMIN — VEDOLIZUMAB 300 MG: 300 INJECTION, POWDER, LYOPHILIZED, FOR SOLUTION INTRAVENOUS at 08:11

## 2023-12-15 ENCOUNTER — INFUSION (OUTPATIENT)
Dept: INFUSION THERAPY | Facility: HOSPITAL | Age: 48
End: 2023-12-15
Attending: INTERNAL MEDICINE
Payer: MEDICARE

## 2023-12-15 VITALS
BODY MASS INDEX: 34.36 KG/M2 | OXYGEN SATURATION: 96 % | TEMPERATURE: 98 F | WEIGHT: 240 LBS | HEART RATE: 85 BPM | DIASTOLIC BLOOD PRESSURE: 78 MMHG | HEIGHT: 70 IN | SYSTOLIC BLOOD PRESSURE: 136 MMHG | RESPIRATION RATE: 16 BRPM

## 2023-12-15 DIAGNOSIS — K50.919 CROHN'S DISEASE WITH COMPLICATION, UNSPECIFIED GASTROINTESTINAL TRACT LOCATION: Primary | ICD-10-CM

## 2023-12-15 PROCEDURE — 25000003 PHARM REV CODE 250: Performed by: INTERNAL MEDICINE

## 2023-12-15 PROCEDURE — 96365 THER/PROPH/DIAG IV INF INIT: CPT

## 2023-12-15 PROCEDURE — 63600175 PHARM REV CODE 636 W HCPCS: Mod: JZ,JG | Performed by: INTERNAL MEDICINE

## 2023-12-15 RX ORDER — HEPARIN 100 UNIT/ML
500 SYRINGE INTRAVENOUS
Status: CANCELLED | OUTPATIENT
Start: 2024-01-12

## 2023-12-15 RX ORDER — DIPHENHYDRAMINE HYDROCHLORIDE 50 MG/ML
25 INJECTION INTRAMUSCULAR; INTRAVENOUS EVERY 4 HOURS PRN
Status: DISCONTINUED | OUTPATIENT
Start: 2023-12-15 | End: 2023-12-15 | Stop reason: HOSPADM

## 2023-12-15 RX ORDER — METHYLPREDNISOLONE SOD SUCC 125 MG
40 VIAL (EA) INJECTION
Status: CANCELLED | OUTPATIENT
Start: 2024-01-12

## 2023-12-15 RX ORDER — SODIUM CHLORIDE 0.9 % (FLUSH) 0.9 %
10 SYRINGE (ML) INJECTION
Status: CANCELLED | OUTPATIENT
Start: 2024-01-12

## 2023-12-15 RX ORDER — ACETAMINOPHEN 325 MG/1
650 TABLET ORAL
Status: CANCELLED | OUTPATIENT
Start: 2024-01-12

## 2023-12-15 RX ORDER — SODIUM CHLORIDE 0.9 % (FLUSH) 0.9 %
10 SYRINGE (ML) INJECTION
Status: DISCONTINUED | OUTPATIENT
Start: 2023-12-15 | End: 2023-12-15 | Stop reason: HOSPADM

## 2023-12-15 RX ORDER — ACETAMINOPHEN 325 MG/1
650 TABLET ORAL
Status: ACTIVE | OUTPATIENT
Start: 2023-12-15 | End: 2023-12-15

## 2023-12-15 RX ORDER — HEPARIN 100 UNIT/ML
500 SYRINGE INTRAVENOUS
Status: DISCONTINUED | OUTPATIENT
Start: 2023-12-15 | End: 2023-12-15 | Stop reason: HOSPADM

## 2023-12-15 RX ORDER — EPINEPHRINE 0.3 MG/.3ML
0.3 INJECTION SUBCUTANEOUS
Status: CANCELLED
Start: 2024-01-12

## 2023-12-15 RX ORDER — METHYLPREDNISOLONE SOD SUCC 125 MG
40 VIAL (EA) INJECTION
Status: ACTIVE | OUTPATIENT
Start: 2023-12-15 | End: 2023-12-15

## 2023-12-15 RX ORDER — DIPHENHYDRAMINE HYDROCHLORIDE 50 MG/ML
25 INJECTION INTRAMUSCULAR; INTRAVENOUS EVERY 4 HOURS PRN
Status: CANCELLED | OUTPATIENT
Start: 2024-01-12

## 2023-12-15 RX ADMIN — SODIUM CHLORIDE: 9 INJECTION, SOLUTION INTRAVENOUS at 08:12

## 2023-12-15 RX ADMIN — VEDOLIZUMAB 300 MG: 300 INJECTION, POWDER, LYOPHILIZED, FOR SOLUTION INTRAVENOUS at 08:12

## 2023-12-15 NOTE — PLAN OF CARE
Entyvio given. Tolerated well.  Message sent to schedulers to make nxt appointment and to call him with date and time.  Discharged to home.

## 2024-01-12 ENCOUNTER — INFUSION (OUTPATIENT)
Dept: INFUSION THERAPY | Facility: HOSPITAL | Age: 49
End: 2024-01-12
Attending: INTERNAL MEDICINE
Payer: MEDICARE

## 2024-01-12 VITALS
SYSTOLIC BLOOD PRESSURE: 130 MMHG | WEIGHT: 240 LBS | TEMPERATURE: 98 F | DIASTOLIC BLOOD PRESSURE: 78 MMHG | BODY MASS INDEX: 34.36 KG/M2 | OXYGEN SATURATION: 95 % | HEART RATE: 84 BPM | RESPIRATION RATE: 16 BRPM | HEIGHT: 70 IN

## 2024-01-12 DIAGNOSIS — K50.919 CROHN'S DISEASE WITH COMPLICATION, UNSPECIFIED GASTROINTESTINAL TRACT LOCATION: Primary | ICD-10-CM

## 2024-01-12 PROCEDURE — 25000003 PHARM REV CODE 250: Performed by: INTERNAL MEDICINE

## 2024-01-12 PROCEDURE — 63600175 PHARM REV CODE 636 W HCPCS: Mod: JZ,JG | Performed by: INTERNAL MEDICINE

## 2024-01-12 PROCEDURE — 96365 THER/PROPH/DIAG IV INF INIT: CPT

## 2024-01-12 RX ORDER — SODIUM CHLORIDE 0.9 % (FLUSH) 0.9 %
10 SYRINGE (ML) INJECTION
Status: DISCONTINUED | OUTPATIENT
Start: 2024-01-12 | End: 2024-01-12 | Stop reason: HOSPADM

## 2024-01-12 RX ORDER — METHYLPREDNISOLONE SOD SUCC 125 MG
40 VIAL (EA) INJECTION
Status: CANCELLED | OUTPATIENT
Start: 2024-02-09

## 2024-01-12 RX ORDER — DIPHENHYDRAMINE HYDROCHLORIDE 50 MG/ML
25 INJECTION, SOLUTION INTRAMUSCULAR; INTRAVENOUS EVERY 4 HOURS PRN
Status: DISCONTINUED | OUTPATIENT
Start: 2024-01-12 | End: 2024-01-12 | Stop reason: HOSPADM

## 2024-01-12 RX ORDER — HEPARIN 100 UNIT/ML
500 SYRINGE INTRAVENOUS
Status: DISCONTINUED | OUTPATIENT
Start: 2024-01-12 | End: 2024-01-12 | Stop reason: HOSPADM

## 2024-01-12 RX ORDER — DIPHENHYDRAMINE HYDROCHLORIDE 50 MG/ML
25 INJECTION, SOLUTION INTRAMUSCULAR; INTRAVENOUS EVERY 4 HOURS PRN
Status: CANCELLED | OUTPATIENT
Start: 2024-02-09

## 2024-01-12 RX ORDER — ACETAMINOPHEN 325 MG/1
650 TABLET ORAL
Status: ACTIVE | OUTPATIENT
Start: 2024-01-12 | End: 2024-01-12

## 2024-01-12 RX ORDER — EPINEPHRINE 0.3 MG/.3ML
0.3 INJECTION SUBCUTANEOUS
Status: CANCELLED
Start: 2024-02-09

## 2024-01-12 RX ORDER — HEPARIN 100 UNIT/ML
500 SYRINGE INTRAVENOUS
Status: CANCELLED | OUTPATIENT
Start: 2024-02-09

## 2024-01-12 RX ORDER — ACETAMINOPHEN 325 MG/1
650 TABLET ORAL
Status: CANCELLED | OUTPATIENT
Start: 2024-02-09

## 2024-01-12 RX ORDER — SODIUM CHLORIDE 0.9 % (FLUSH) 0.9 %
10 SYRINGE (ML) INJECTION
Status: CANCELLED | OUTPATIENT
Start: 2024-02-09

## 2024-01-12 RX ADMIN — VEDOLIZUMAB 300 MG: 300 INJECTION, POWDER, LYOPHILIZED, FOR SOLUTION INTRAVENOUS at 08:01

## 2024-01-30 ENCOUNTER — PATIENT MESSAGE (OUTPATIENT)
Dept: FAMILY MEDICINE | Facility: CLINIC | Age: 49
End: 2024-01-30

## 2024-01-30 ENCOUNTER — OFFICE VISIT (OUTPATIENT)
Dept: FAMILY MEDICINE | Facility: CLINIC | Age: 49
End: 2024-01-30
Payer: MEDICARE

## 2024-01-30 VITALS
HEART RATE: 79 BPM | TEMPERATURE: 98 F | DIASTOLIC BLOOD PRESSURE: 82 MMHG | BODY MASS INDEX: 33.93 KG/M2 | HEIGHT: 70 IN | SYSTOLIC BLOOD PRESSURE: 130 MMHG | WEIGHT: 237 LBS | OXYGEN SATURATION: 98 %

## 2024-01-30 DIAGNOSIS — R79.89 LOW TESTOSTERONE: ICD-10-CM

## 2024-01-30 DIAGNOSIS — E13.69 OTHER SPECIFIED DIABETES MELLITUS WITH OTHER SPECIFIED COMPLICATION: ICD-10-CM

## 2024-01-30 DIAGNOSIS — Z23 ENCOUNTER FOR IMMUNIZATION: ICD-10-CM

## 2024-01-30 DIAGNOSIS — E11.9 TYPE 2 DIABETES MELLITUS WITHOUT COMPLICATIONS: ICD-10-CM

## 2024-01-30 DIAGNOSIS — R16.0 HEPATOMEGALY: ICD-10-CM

## 2024-01-30 DIAGNOSIS — Z93.3 COLOSTOMY IN PLACE: Primary | ICD-10-CM

## 2024-01-30 DIAGNOSIS — Z00.00 WELLNESS EXAMINATION: ICD-10-CM

## 2024-01-30 DIAGNOSIS — R79.9 ABNORMAL FINDING OF BLOOD CHEMISTRY, UNSPECIFIED: ICD-10-CM

## 2024-01-30 DIAGNOSIS — R11.2 NAUSEA AND VOMITING, UNSPECIFIED VOMITING TYPE: ICD-10-CM

## 2024-01-30 DIAGNOSIS — E11.9 TYPE 2 DIABETES MELLITUS WITHOUT COMPLICATION, WITHOUT LONG-TERM CURRENT USE OF INSULIN: ICD-10-CM

## 2024-01-30 DIAGNOSIS — E03.4 HYPOTHYROIDISM DUE TO ACQUIRED ATROPHY OF THYROID: ICD-10-CM

## 2024-01-30 DIAGNOSIS — K21.00 GASTROESOPHAGEAL REFLUX DISEASE WITH ESOPHAGITIS WITHOUT HEMORRHAGE: ICD-10-CM

## 2024-01-30 LAB
ALBUMIN SERPL-MCNC: 4.1 G/DL (ref 3.5–5)
ALBUMIN/GLOB SERPL: 1.1 RATIO (ref 1.1–2)
ALP SERPL-CCNC: 70 UNIT/L (ref 40–150)
ALT SERPL-CCNC: 36 UNIT/L (ref 0–55)
AST SERPL-CCNC: 27 UNIT/L (ref 5–34)
BASOPHILS # BLD AUTO: 0.06 X10(3)/MCL
BASOPHILS NFR BLD AUTO: 0.8 %
BILIRUB SERPL-MCNC: 0.4 MG/DL
BUN SERPL-MCNC: 12.6 MG/DL (ref 8.9–20.6)
CALCIUM SERPL-MCNC: 9 MG/DL (ref 8.4–10.2)
CHLORIDE SERPL-SCNC: 108 MMOL/L (ref 98–107)
CHOLEST SERPL-MCNC: 85 MG/DL
CHOLEST/HDLC SERPL: 3 {RATIO} (ref 0–5)
CO2 SERPL-SCNC: 26 MMOL/L (ref 22–29)
CREAT SERPL-MCNC: 1.22 MG/DL (ref 0.73–1.18)
EOSINOPHIL # BLD AUTO: 0.42 X10(3)/MCL (ref 0–0.9)
EOSINOPHIL NFR BLD AUTO: 5.5 %
ERYTHROCYTE [DISTWIDTH] IN BLOOD BY AUTOMATED COUNT: 14.6 % (ref 11.5–17)
EST. AVERAGE GLUCOSE BLD GHB EST-MCNC: 168.6 MG/DL
GFR SERPLBLD CREATININE-BSD FMLA CKD-EPI: >60 MLS/MIN/1.73/M2
GLOBULIN SER-MCNC: 3.6 GM/DL (ref 2.4–3.5)
GLUCOSE SERPL-MCNC: 161 MG/DL (ref 74–100)
HBA1C MFR BLD: 7.5 %
HCT VFR BLD AUTO: 40.2 % (ref 42–52)
HDLC SERPL-MCNC: 27 MG/DL (ref 35–60)
HGB BLD-MCNC: 12.4 G/DL (ref 14–18)
IMM GRANULOCYTES # BLD AUTO: 0.04 X10(3)/MCL (ref 0–0.04)
IMM GRANULOCYTES NFR BLD AUTO: 0.5 %
LDLC SERPL CALC-MCNC: 30 MG/DL (ref 50–140)
LYMPHOCYTES # BLD AUTO: 2.35 X10(3)/MCL (ref 0.6–4.6)
LYMPHOCYTES NFR BLD AUTO: 31 %
MCH RBC QN AUTO: 23.5 PG (ref 27–31)
MCHC RBC AUTO-ENTMCNC: 30.8 G/DL (ref 33–36)
MCV RBC AUTO: 76.3 FL (ref 80–94)
MONOCYTES # BLD AUTO: 0.49 X10(3)/MCL (ref 0.1–1.3)
MONOCYTES NFR BLD AUTO: 6.5 %
NEUTROPHILS # BLD AUTO: 4.21 X10(3)/MCL (ref 2.1–9.2)
NEUTROPHILS NFR BLD AUTO: 55.7 %
NRBC BLD AUTO-RTO: 0 %
PLATELET # BLD AUTO: 238 X10(3)/MCL (ref 130–400)
PMV BLD AUTO: 9.4 FL (ref 7.4–10.4)
POTASSIUM SERPL-SCNC: 4.2 MMOL/L (ref 3.5–5.1)
PROT SERPL-MCNC: 7.7 GM/DL (ref 6.4–8.3)
RBC # BLD AUTO: 5.27 X10(6)/MCL (ref 4.7–6.1)
SODIUM SERPL-SCNC: 142 MMOL/L (ref 136–145)
T4 FREE SERPL-MCNC: 1.09 NG/DL (ref 0.7–1.48)
TRIGL SERPL-MCNC: 142 MG/DL (ref 34–140)
TSH SERPL-ACNC: 0.19 UIU/ML (ref 0.35–4.94)
VLDLC SERPL CALC-MCNC: 28 MG/DL
WBC # SPEC AUTO: 7.57 X10(3)/MCL (ref 4.5–11.5)

## 2024-01-30 PROCEDURE — 3008F BODY MASS INDEX DOCD: CPT | Mod: CPTII,,, | Performed by: STUDENT IN AN ORGANIZED HEALTH CARE EDUCATION/TRAINING PROGRAM

## 2024-01-30 PROCEDURE — 1159F MED LIST DOCD IN RCRD: CPT | Mod: CPTII,,, | Performed by: STUDENT IN AN ORGANIZED HEALTH CARE EDUCATION/TRAINING PROGRAM

## 2024-01-30 PROCEDURE — 84443 ASSAY THYROID STIM HORMONE: CPT | Performed by: STUDENT IN AN ORGANIZED HEALTH CARE EDUCATION/TRAINING PROGRAM

## 2024-01-30 PROCEDURE — 36415 COLL VENOUS BLD VENIPUNCTURE: CPT | Performed by: STUDENT IN AN ORGANIZED HEALTH CARE EDUCATION/TRAINING PROGRAM

## 2024-01-30 PROCEDURE — 90677 PCV20 VACCINE IM: CPT | Mod: PBBFAC,PN

## 2024-01-30 PROCEDURE — 4010F ACE/ARB THERAPY RXD/TAKEN: CPT | Mod: CPTII,,, | Performed by: STUDENT IN AN ORGANIZED HEALTH CARE EDUCATION/TRAINING PROGRAM

## 2024-01-30 PROCEDURE — 83036 HEMOGLOBIN GLYCOSYLATED A1C: CPT | Performed by: STUDENT IN AN ORGANIZED HEALTH CARE EDUCATION/TRAINING PROGRAM

## 2024-01-30 PROCEDURE — 85025 COMPLETE CBC W/AUTO DIFF WBC: CPT | Performed by: STUDENT IN AN ORGANIZED HEALTH CARE EDUCATION/TRAINING PROGRAM

## 2024-01-30 PROCEDURE — 3075F SYST BP GE 130 - 139MM HG: CPT | Mod: CPTII,,, | Performed by: STUDENT IN AN ORGANIZED HEALTH CARE EDUCATION/TRAINING PROGRAM

## 2024-01-30 PROCEDURE — 3051F HG A1C>EQUAL 7.0%<8.0%: CPT | Mod: CPTII,,, | Performed by: STUDENT IN AN ORGANIZED HEALTH CARE EDUCATION/TRAINING PROGRAM

## 2024-01-30 PROCEDURE — 80053 COMPREHEN METABOLIC PANEL: CPT | Performed by: STUDENT IN AN ORGANIZED HEALTH CARE EDUCATION/TRAINING PROGRAM

## 2024-01-30 PROCEDURE — 3079F DIAST BP 80-89 MM HG: CPT | Mod: CPTII,,, | Performed by: STUDENT IN AN ORGANIZED HEALTH CARE EDUCATION/TRAINING PROGRAM

## 2024-01-30 PROCEDURE — 80061 LIPID PANEL: CPT | Performed by: STUDENT IN AN ORGANIZED HEALTH CARE EDUCATION/TRAINING PROGRAM

## 2024-01-30 PROCEDURE — 99214 OFFICE O/P EST MOD 30 MIN: CPT | Mod: S$PBB,,, | Performed by: STUDENT IN AN ORGANIZED HEALTH CARE EDUCATION/TRAINING PROGRAM

## 2024-01-30 PROCEDURE — 84439 ASSAY OF FREE THYROXINE: CPT | Performed by: STUDENT IN AN ORGANIZED HEALTH CARE EDUCATION/TRAINING PROGRAM

## 2024-01-30 PROCEDURE — 99213 OFFICE O/P EST LOW 20 MIN: CPT | Mod: PBBFAC,PN,25 | Performed by: STUDENT IN AN ORGANIZED HEALTH CARE EDUCATION/TRAINING PROGRAM

## 2024-01-30 RX ORDER — LEVOTHYROXINE SODIUM 137 UG/1
137 TABLET ORAL
Qty: 90 TABLET | Refills: 3 | Status: SHIPPED | OUTPATIENT
Start: 2024-01-30 | End: 2024-04-30

## 2024-01-30 RX ORDER — LOSARTAN POTASSIUM 25 MG/1
25 TABLET ORAL DAILY
Qty: 90 TABLET | Refills: 3 | Status: SHIPPED | OUTPATIENT
Start: 2024-01-30 | End: 2025-01-29

## 2024-01-30 RX ORDER — TRAMADOL HYDROCHLORIDE 50 MG/1
TABLET ORAL
COMMUNITY
Start: 2023-08-26

## 2024-01-30 NOTE — ASSESSMENT & PLAN NOTE
A1c 7.5.  Asking patient to do shared decision making to start Jardiance  .  Continue   Janumet 50/1000  Ozempic to 1 mg weekly

## 2024-01-30 NOTE — ASSESSMENT & PLAN NOTE
Referring patient back to his gastroenterologist for possible EGD adding Pepcid at night for patient until he sees gastroenterologist

## 2024-01-30 NOTE — PROGRESS NOTES
Patient Name: Sheila Hamilton     : 1975    MRN: [id number]     Subjective:     Patient ID: Sheila Hamilton is a 48 y.o. male.    Chief Complaint:   Chief Complaint   Patient presents with    Follow-up     Patient here for follow up. /82        HPI: HPI  48-year-old male presents to clinic for follow-up of diabetes and new acute on chronic issue of nausea and vomiting after eating  States that it happened several times per week where he does feel acid rising in his chest states that vomiting helps him feel  Better is currently taking  Protonix does have a GI doctor that he sees  But has not had upper scope  States that this issue was present prior to ever starting Ozempic  Crohn's disease  sees Dr. Trent has infusion every month at [facility] has an ileostomy  Protonix 40 meq daily    Elevated Liver Enzymes  Noted previously. Also states his cardiologist asked him to discuss this with PCP  Hep panel negative 2022  Patient reports going to his gastrointestinal doctor who asked him to lose weight to help his elevated liver enzymes secondary to fatty liver disease      Hypothyroidism  takes levothyroxine 125 mcg   TSH and T4 WNL  Needs repeat today    diabetes  A1c 7.3  A1c 24 7.5   Janumet  mg twice daily  Ozempic1 mg qweek    Anxiety  Sees a psychiatrist who has prescribed diazepam 10 mg 3 times daily  Bupropion 150 mg twice daily  Trileptal 300 mg twice daily  Trazodone 50 mg at bedtime  Bsupar 5 mg   Abilify 10 mg       Reactive airway disease  Uses albuterol as needed  Also uses Flonase twice daily for allergies    Hypertension  Amenable to trying losartan    History of stent placement in legs  sees [facility]    HLD  Rosuvastatin 10 at bedtime    Low testosterone  testosterone 200 mg injections Patient reports that he takes every 14 days week.  Long discussion as patient receiving these injections from provider in [place]. Does not manage just gives injections.        ROS:       ROS     12 point review of systems conducted, negative except as stated in the history of present illness. See HPI for details.    History:     Past Medical History:   Diagnosis Date    Anxiety     Depression     Diabetes mellitus, type 2     GERD (gastroesophageal reflux disease)     Hypothyroidism         Past Surgical History:   Procedure Laterality Date    CHOLECYSTECTOMY      HERNIA REPAIR      ILEOSTOMY Left        Family History   Problem Relation Age of Onset    Cancer Mother         Social History     Tobacco Use    Smoking status: Never     Passive exposure: Never    Smokeless tobacco: Never   Substance and Sexual Activity    Alcohol use: Not Currently    Drug use: Never    Sexual activity: Yes     Partners: Female       Current Outpatient Medications   Medication Instructions    albuterol (PROVENTIL/VENTOLIN HFA) 90 mcg/actuation inhaler No dose, route, or frequency recorded.    ARIPiprazole (ABILIFY) 10 mg, Oral, Daily    azelastine (ASTELIN) 137 mcg (0.1 %) nasal spray No dose, route, or frequency recorded.    blood sugar diagnostic Strp 100 each, Misc.(Non-Drug; Combo Route), 3 times daily    blood-glucose meter kit Use as instructed    buPROPion (WELLBUTRIN SR) 150 mg, Oral, 2 times daily    busPIRone (BUSPAR) 7.5 mg, Oral, 3 times daily    cetirizine (ZYRTEC) 10 mg, Oral    clopidogreL (PLAVIX) 75 mg, Oral    dicyclomine (BENTYL) 20 mg, Oral, 3 times daily    EScitalopram oxalate (LEXAPRO) 10 mg, Oral    fluconazole (DIFLUCAN) 150 MG Tab No dose, route, or frequency recorded.    fluconazole (DIFLUCAN) 150 MG Tab Take one tablet by mouth today and one in three days    fluticasone propionate (FLONASE) 50 mcg/actuation nasal spray 2 sprays, Nasal    furosemide (LASIX) 40 mg, Oral, Daily    HYDROcodone-acetaminophen (NORCO) 5-325 mg per tablet 1 tablet, Oral, Every 4 hours PRN    levothyroxine (SYNTHROID) 137 mcg, Oral, Before breakfast    losartan (COZAAR) 25 mg, Oral, Daily    ondansetron  "(ZOFRAN-ODT) 4 mg, Oral, Every 8 hours PRN    OXcarbazepine (TRILEPTAL) 300 mg, Oral, 2 times daily    oxyCODONE-acetaminophen (PERCOCET)  mg per tablet 1 tablet, Oral, Every 4 hours PRN    OZEMPIC 1 mg, Subcutaneous, Every 7 days    pantoprazole (PROTONIX) 40 mg, Oral, Daily    potassium chloride (MICRO-K) 10 MEQ CpSR 20 mEq, Oral    promethazine (PHENERGAN) 25 mg, Oral, Every 6 hours PRN    rosuvastatin (CRESTOR) 10 mg, Oral, Nightly    SITagliptan-metformin (JANUMET) 50-1,000 mg per tablet 1 tablet, Oral, 2 times daily with meals    testosterone cypionate (DEPOTESTOTERONE CYPIONATE) 100 mg, Intramuscular, Every 14 days    traMADoL (ULTRAM) 50 mg tablet 1 tablet as needed Orally every 6 hours for 3 days    traZODone (DESYREL) 100 mg, Oral        Review of patient's allergies indicates:   Allergen Reactions    Bee venom protein (honey bee)     Insect venom        Objective:     Visit Vitals  /82 (BP Location: Right arm, Patient Position: Sitting)   Pulse 79   Temp 98.4 °F (36.9 °C) (Oral)   Ht 5' 10" (1.778 m)   Wt 107.5 kg (237 lb)   SpO2 98%   BMI 34.01 kg/m²       Physical Examination:     Physical Exam    Lab Results:     Chemistry:  Lab Results   Component Value Date     01/30/2024    K 4.2 01/30/2024    CHLORIDE 108 (H) 01/30/2024    BUN 12.6 01/30/2024    CREATININE 1.22 (H) 01/30/2024    EGFRNORACEVR >60 01/30/2024    GLUCOSE 161 (H) 01/30/2024    CALCIUM 9.0 01/30/2024    ALKPHOS 70 01/30/2024    LABPROT 7.7 01/30/2024    ALBUMIN 4.1 01/30/2024    BILIDIR 0.4 04/22/2022    IBILI 0.60 04/22/2022    AST 27 01/30/2024    ALT 36 01/30/2024    TSH 0.186 (L) 01/30/2024    [id number] 1.09 01/30/2024    PSA 1.81 09/28/2023        Lab Results   Component Value Date    HGBA1C 7.5 (H) 01/30/2024        Hematology:  Lab Results   Component Value Date    WBC 7.57 01/30/2024    HGB 12.4 (L) 01/30/2024    HCT 40.2 (L) 01/30/2024     01/30/2024       Lipid Panel:  Lab Results   Component Value " Date    CHOL 85 01/30/2024    HDL 27 (L) 01/30/2024    LDL 30.00 (L) 01/30/2024    TRIG 142 (H) 01/30/2024    TOTALCHOLEST 3 01/30/2024        Urine:  Lab Results   Component Value Date    COLORUA Yellow 09/28/2023    APPEARANCEUA Turbid (A) 09/28/2023    SGUA 1.021 09/28/2023    PHUA 5.5 09/28/2023    PROTEINUA Trace (A) 09/28/2023    GLUCOSEUA 4+ (A) 09/28/2023    KETONESUA Trace (A) 09/28/2023    BLOODUA Negative 09/28/2023    NITRITESUA 2+ (A) 09/28/2023    LEUKOCYTESUR 500 (A) 09/28/2023    RBCUA 0-5 09/28/2023    WBCUA >100 (A) 09/28/2023    BACTERIA Trace (A) 09/28/2023    SQEPUA Trace (A) 09/28/2023    HYALINECASTS None Seen 09/28/2023    CREATRANDUR 183.8 (H) 06/27/2023        Assessment:          ICD-10-CM ICD-9-CM   1. Colostomy in place  Z93.3 V44.3   2. Type 2 diabetes mellitus without complication, without long-term current use of insulin  E11.9 250.00   3. Low testosterone  R79.89 790.99   4. Hepatomegaly  R16.0 789.1   5. Wellness examination  Z00.00 V70.0   6. Abnormal finding of blood chemistry, unspecified  R79.9 790.6   7. Other specified diabetes mellitus with other specified complication  E13.69 250.80   8. Type 2 diabetes mellitus without complications  E11.9 250.00   9. Encounter for immunization  Z23 V03.89   10. Gastroesophageal reflux disease with esophagitis without hemorrhage  K21.00 530.81     530.10   11. Nausea and vomiting, unspecified vomiting type  R11.2 787.01   12. Hypothyroidism due to acquired atrophy of thyroid  E03.4 244.8     246.8        Plan:     1. Colostomy in place    2. Type 2 diabetes mellitus without complication, without long-term current use of insulin  Assessment & Plan:  A1c 7.5.  Asking patient to do shared decision making to start Jardiance  .  Continue   Janumet 50/1000  Ozempic to 1 mg weekly    Orders:  -     Hemoglobin A1C; Future; Expected date: 01/30/2024    3. Low testosterone  Assessment & Plan:  Patient receiving injections at outside  clinic  Testosterone labs    Orders:  -     Testosterone; Future; Expected date: 01/30/2024  -     Testosterone; Future; Expected date: 01/30/2024    4. Hepatomegaly  Assessment & Plan:  Referred patient to [place] for discussion of hepatic steatosis  Encouraged patient to discuss with his GI doctor  States diet is fatty   Hep panel negative 6/2022  Encourage continued weight loss with Ozempic      Orders:  -     Comprehensive Metabolic Panel; Future; Expected date: 01/30/2024    5. Wellness examination  -     CBC Auto Differential; Future; Expected date: 01/30/2024  -     Comprehensive Metabolic Panel; Future; Expected date: 01/30/2024  -     Lipid Panel; Future; Expected date: 01/30/2024  -     TSH; Future; Expected date: 01/30/2024  -     Hemoglobin A1C; Future; Expected date: 01/30/2024  -     T4, Free; Future; Expected date: 01/30/2024  -     Testosterone; Future; Expected date: 01/30/2024    6. Abnormal finding of blood chemistry, unspecified  -     CBC Auto Differential; Future; Expected date: 01/30/2024  -     Lipid Panel; Future; Expected date: 01/30/2024    7. Other specified diabetes mellitus with other specified complication  -     TSH; Future; Expected date: 01/30/2024    8. Type 2 diabetes mellitus without complications  -     T4, Free; Future; Expected date: 01/30/2024  -     Diabetic Eye Screening Photo  -     losartan (COZAAR) 25 MG tablet; Take 1 tablet (25 mg total) by mouth once daily.  Dispense: 90 tablet; Refill: 3    9. Encounter for immunization  -     (In Office Administered) Pneumococcal Conjugate Vaccine (20 Valent) (IM) (Preferred)    10. Gastroesophageal reflux disease with esophagitis without hemorrhage  -     Ambulatory referral/consult to Gastroenterology; Future; Expected date: 02/06/2024    11. Nausea and vomiting, unspecified vomiting type  Assessment & Plan:  Referring patient back to his gastroenterologist for possible EGD adding Pepcid at night for patient until he sees  gastroenterologist      12. Hypothyroidism due to acquired atrophy of thyroid  Assessment & Plan:  TSH and T4 today  TSH depressed decreasing levothyroxine from 150 mcg daily to 137 mcg daily      Other orders  -     levothyroxine (SYNTHROID) 137 MCG Tab tablet; Take 1 tablet (137 mcg total) by mouth before breakfast.  Dispense: 90 tablet; Refill: 3         Follow up in about 3 months (around 4/30/2024) for DM with foot exam.    Future Appointments   Date Time Provider Department Center   2/9/2024  9:00 AM CHAIR 26, [facility] CHEMOTHERAPY INFUSION [facility]   4/30/2024  9:40 AM Alena Mcqueen MD [facility]        Alena Mcqueen MD

## 2024-02-05 DIAGNOSIS — Z79.4 TYPE 2 DIABETES MELLITUS WITHOUT COMPLICATION, WITH LONG-TERM CURRENT USE OF INSULIN: ICD-10-CM

## 2024-02-05 DIAGNOSIS — E11.9 TYPE 2 DIABETES MELLITUS WITHOUT COMPLICATION, WITH LONG-TERM CURRENT USE OF INSULIN: ICD-10-CM

## 2024-02-05 NOTE — TELEPHONE ENCOUNTER
----- Message from Alma Rosa Moise sent at 2/5/2024  9:12 AM CST -----  Regarding: Rx refill  [facility]

## 2024-02-05 NOTE — TELEPHONE ENCOUNTER
No care due was identified.  [facility] Embedded Care Due Messages. Reference number: [id number].   2/05/2024 11:32:04 AM CST

## 2024-02-08 RX ORDER — EPINEPHRINE 1 MG/ML
0.3 INJECTION, SOLUTION, CONCENTRATE INTRAVENOUS
Status: CANCELLED | OUTPATIENT
Start: 2024-02-09

## 2024-02-08 RX ORDER — SODIUM CHLORIDE 0.9 % (FLUSH) 0.9 %
10 SYRINGE (ML) INJECTION
Status: CANCELLED | OUTPATIENT
Start: 2024-02-09

## 2024-02-08 RX ORDER — DIPHENHYDRAMINE HYDROCHLORIDE 50 MG/ML
25 INJECTION INTRAMUSCULAR; INTRAVENOUS ONCE AS NEEDED
Status: CANCELLED | OUTPATIENT
Start: 2024-02-09

## 2024-02-08 RX ORDER — METHYLPREDNISOLONE SOD SUCC 125 MG
40 VIAL (EA) INJECTION
Status: CANCELLED | OUTPATIENT
Start: 2024-02-09

## 2024-02-08 RX ORDER — ACETAMINOPHEN 325 MG/1
650 TABLET ORAL
Status: CANCELLED | OUTPATIENT
Start: 2024-02-09

## 2024-02-08 RX ORDER — HEPARIN 100 UNIT/ML
500 SYRINGE INTRAVENOUS
Status: CANCELLED | OUTPATIENT
Start: 2024-02-09

## 2024-02-09 ENCOUNTER — INFUSION (OUTPATIENT)
Dept: INFUSION THERAPY | Facility: HOSPITAL | Age: 49
End: 2024-02-09
Attending: INTERNAL MEDICINE
Payer: MEDICARE

## 2024-02-09 VITALS
RESPIRATION RATE: 20 BRPM | WEIGHT: 237 LBS | HEIGHT: 70 IN | HEART RATE: 91 BPM | BODY MASS INDEX: 33.93 KG/M2 | SYSTOLIC BLOOD PRESSURE: 137 MMHG | DIASTOLIC BLOOD PRESSURE: 87 MMHG

## 2024-02-09 DIAGNOSIS — K50.919 CROHN'S DISEASE WITH COMPLICATION, UNSPECIFIED GASTROINTESTINAL TRACT LOCATION: Primary | ICD-10-CM

## 2024-02-09 PROCEDURE — 63600175 PHARM REV CODE 636 W HCPCS: Mod: JZ,JG | Performed by: INTERNAL MEDICINE

## 2024-02-09 PROCEDURE — 96365 THER/PROPH/DIAG IV INF INIT: CPT

## 2024-02-09 PROCEDURE — 25000003 PHARM REV CODE 250: Performed by: INTERNAL MEDICINE

## 2024-02-09 RX ORDER — ACETAMINOPHEN 325 MG/1
650 TABLET ORAL
Status: CANCELLED | OUTPATIENT
Start: 2024-03-08

## 2024-02-09 RX ORDER — DIPHENHYDRAMINE HYDROCHLORIDE 50 MG/ML
25 INJECTION INTRAMUSCULAR; INTRAVENOUS ONCE AS NEEDED
Status: CANCELLED | OUTPATIENT
Start: 2024-03-08

## 2024-02-09 RX ORDER — EPINEPHRINE 1 MG/ML
0.3 INJECTION, SOLUTION, CONCENTRATE INTRAVENOUS
Status: CANCELLED | OUTPATIENT
Start: 2024-03-08

## 2024-02-09 RX ORDER — METHYLPREDNISOLONE SOD SUCC 125 MG
40 VIAL (EA) INJECTION
Status: CANCELLED | OUTPATIENT
Start: 2024-03-08

## 2024-02-09 RX ORDER — HEPARIN 100 UNIT/ML
500 SYRINGE INTRAVENOUS
Status: CANCELLED | OUTPATIENT
Start: 2024-03-08

## 2024-02-09 RX ORDER — HEPARIN 100 UNIT/ML
500 SYRINGE INTRAVENOUS
Status: DISCONTINUED | OUTPATIENT
Start: 2024-02-09 | End: 2024-02-09 | Stop reason: HOSPADM

## 2024-02-09 RX ORDER — ACETAMINOPHEN 325 MG/1
650 TABLET ORAL
Status: ACTIVE | OUTPATIENT
Start: 2024-02-09 | End: 2024-02-09

## 2024-02-09 RX ORDER — EPINEPHRINE 1 MG/ML
0.3 INJECTION, SOLUTION, CONCENTRATE INTRAVENOUS
Status: DISPENSED | OUTPATIENT
Start: 2024-02-09 | End: 2024-02-09

## 2024-02-09 RX ORDER — SODIUM CHLORIDE 0.9 % (FLUSH) 0.9 %
10 SYRINGE (ML) INJECTION
Status: DISCONTINUED | OUTPATIENT
Start: 2024-02-09 | End: 2024-02-09 | Stop reason: HOSPADM

## 2024-02-09 RX ORDER — DIPHENHYDRAMINE HYDROCHLORIDE 50 MG/ML
25 INJECTION INTRAMUSCULAR; INTRAVENOUS ONCE AS NEEDED
Status: DISCONTINUED | OUTPATIENT
Start: 2024-02-09 | End: 2024-02-09 | Stop reason: HOSPADM

## 2024-02-09 RX ORDER — SODIUM CHLORIDE 0.9 % (FLUSH) 0.9 %
10 SYRINGE (ML) INJECTION
Status: CANCELLED | OUTPATIENT
Start: 2024-03-08

## 2024-02-09 RX ADMIN — VEDOLIZUMAB 300 MG: 300 INJECTION, POWDER, LYOPHILIZED, FOR SOLUTION INTRAVENOUS at 08:02

## 2024-03-08 ENCOUNTER — INFUSION (OUTPATIENT)
Dept: INFUSION THERAPY | Facility: HOSPITAL | Age: 49
End: 2024-03-08
Attending: INTERNAL MEDICINE
Payer: MEDICARE

## 2024-03-08 VITALS
RESPIRATION RATE: 16 BRPM | DIASTOLIC BLOOD PRESSURE: 81 MMHG | TEMPERATURE: 99 F | OXYGEN SATURATION: 97 % | WEIGHT: 238.63 LBS | HEART RATE: 83 BPM | HEIGHT: 70 IN | BODY MASS INDEX: 34.16 KG/M2 | SYSTOLIC BLOOD PRESSURE: 131 MMHG

## 2024-03-08 DIAGNOSIS — K50.919 CROHN'S DISEASE WITH COMPLICATION, UNSPECIFIED GASTROINTESTINAL TRACT LOCATION: Primary | ICD-10-CM

## 2024-03-08 PROCEDURE — 96365 THER/PROPH/DIAG IV INF INIT: CPT

## 2024-03-08 PROCEDURE — 25000003 PHARM REV CODE 250: Performed by: INTERNAL MEDICINE

## 2024-03-08 PROCEDURE — 63600175 PHARM REV CODE 636 W HCPCS: Mod: JZ,JG | Performed by: INTERNAL MEDICINE

## 2024-03-08 RX ORDER — ACETAMINOPHEN 325 MG/1
650 TABLET ORAL
Status: ACTIVE | OUTPATIENT
Start: 2024-03-08 | End: 2024-03-08

## 2024-03-08 RX ORDER — DIPHENHYDRAMINE HYDROCHLORIDE 50 MG/ML
25 INJECTION INTRAMUSCULAR; INTRAVENOUS ONCE AS NEEDED
Status: CANCELLED | OUTPATIENT
Start: 2024-04-05

## 2024-03-08 RX ORDER — EPINEPHRINE 1 MG/ML
0.3 INJECTION, SOLUTION, CONCENTRATE INTRAVENOUS
Status: CANCELLED | OUTPATIENT
Start: 2024-04-05

## 2024-03-08 RX ORDER — ACETAMINOPHEN 325 MG/1
650 TABLET ORAL
Status: CANCELLED | OUTPATIENT
Start: 2024-04-05

## 2024-03-08 RX ORDER — SODIUM CHLORIDE 0.9 % (FLUSH) 0.9 %
10 SYRINGE (ML) INJECTION
Status: DISCONTINUED | OUTPATIENT
Start: 2024-03-08 | End: 2024-03-08 | Stop reason: HOSPADM

## 2024-03-08 RX ORDER — HEPARIN 100 UNIT/ML
500 SYRINGE INTRAVENOUS
Status: DISCONTINUED | OUTPATIENT
Start: 2024-03-08 | End: 2024-03-08 | Stop reason: HOSPADM

## 2024-03-08 RX ORDER — EPINEPHRINE 1 MG/ML
0.3 INJECTION, SOLUTION, CONCENTRATE INTRAVENOUS
Status: ACTIVE | OUTPATIENT
Start: 2024-03-08 | End: 2024-03-08

## 2024-03-08 RX ORDER — METHYLPREDNISOLONE SOD SUCC 125 MG
40 VIAL (EA) INJECTION
Status: CANCELLED | OUTPATIENT
Start: 2024-04-05

## 2024-03-08 RX ORDER — HEPARIN 100 UNIT/ML
500 SYRINGE INTRAVENOUS
Status: CANCELLED | OUTPATIENT
Start: 2024-04-05

## 2024-03-08 RX ORDER — SODIUM CHLORIDE 0.9 % (FLUSH) 0.9 %
10 SYRINGE (ML) INJECTION
Status: CANCELLED | OUTPATIENT
Start: 2024-04-05

## 2024-03-08 RX ORDER — METHYLPREDNISOLONE SOD SUCC 125 MG
40 VIAL (EA) INJECTION
Status: ACTIVE | OUTPATIENT
Start: 2024-03-08 | End: 2024-03-08

## 2024-03-08 RX ORDER — DIPHENHYDRAMINE HYDROCHLORIDE 50 MG/ML
25 INJECTION INTRAMUSCULAR; INTRAVENOUS ONCE AS NEEDED
Status: DISCONTINUED | OUTPATIENT
Start: 2024-03-08 | End: 2024-03-08 | Stop reason: HOSPADM

## 2024-03-08 RX ADMIN — VEDOLIZUMAB 300 MG: 300 INJECTION, POWDER, LYOPHILIZED, FOR SOLUTION INTRAVENOUS at 08:03

## 2024-03-08 RX ADMIN — SODIUM CHLORIDE: 9 INJECTION, SOLUTION INTRAVENOUS at 08:03

## 2024-03-08 NOTE — PLAN OF CARE
Plan of care reviewed with patient; moiran in agreement. Entyvio Q4W infused. Appts reviewed; patient uses portal.

## 2024-04-05 ENCOUNTER — INFUSION (OUTPATIENT)
Dept: INFUSION THERAPY | Facility: HOSPITAL | Age: 49
End: 2024-04-05
Attending: INTERNAL MEDICINE
Payer: MEDICARE

## 2024-04-05 VITALS
TEMPERATURE: 99 F | RESPIRATION RATE: 16 BRPM | DIASTOLIC BLOOD PRESSURE: 77 MMHG | WEIGHT: 235.38 LBS | SYSTOLIC BLOOD PRESSURE: 120 MMHG | HEART RATE: 81 BPM | BODY MASS INDEX: 33.7 KG/M2 | HEIGHT: 70 IN | OXYGEN SATURATION: 97 %

## 2024-04-05 DIAGNOSIS — K50.919 CROHN'S DISEASE WITH COMPLICATION, UNSPECIFIED GASTROINTESTINAL TRACT LOCATION: Primary | ICD-10-CM

## 2024-04-05 PROCEDURE — 96365 THER/PROPH/DIAG IV INF INIT: CPT

## 2024-04-05 PROCEDURE — 25000003 PHARM REV CODE 250: Performed by: INTERNAL MEDICINE

## 2024-04-05 PROCEDURE — 63600175 PHARM REV CODE 636 W HCPCS: Mod: JZ,JG | Performed by: INTERNAL MEDICINE

## 2024-04-05 RX ORDER — ACETAMINOPHEN 325 MG/1
650 TABLET ORAL
Status: CANCELLED | OUTPATIENT
Start: 2024-05-03

## 2024-04-05 RX ORDER — EPINEPHRINE 1 MG/ML
0.3 INJECTION, SOLUTION, CONCENTRATE INTRAVENOUS
Status: DISPENSED | OUTPATIENT
Start: 2024-04-05 | End: 2024-04-05

## 2024-04-05 RX ORDER — HEPARIN 100 UNIT/ML
500 SYRINGE INTRAVENOUS
Status: CANCELLED | OUTPATIENT
Start: 2024-05-03

## 2024-04-05 RX ORDER — METHYLPREDNISOLONE SOD SUCC 125 MG
40 VIAL (EA) INJECTION
Status: CANCELLED | OUTPATIENT
Start: 2024-05-03

## 2024-04-05 RX ORDER — DIPHENHYDRAMINE HYDROCHLORIDE 50 MG/ML
25 INJECTION INTRAMUSCULAR; INTRAVENOUS ONCE AS NEEDED
Status: CANCELLED | OUTPATIENT
Start: 2024-05-03

## 2024-04-05 RX ORDER — ACETAMINOPHEN 325 MG/1
650 TABLET ORAL
Status: ACTIVE | OUTPATIENT
Start: 2024-04-05 | End: 2024-04-05

## 2024-04-05 RX ORDER — SODIUM CHLORIDE 0.9 % (FLUSH) 0.9 %
10 SYRINGE (ML) INJECTION
Status: DISCONTINUED | OUTPATIENT
Start: 2024-04-05 | End: 2024-04-05 | Stop reason: HOSPADM

## 2024-04-05 RX ORDER — EPINEPHRINE 1 MG/ML
0.3 INJECTION, SOLUTION, CONCENTRATE INTRAVENOUS
Status: CANCELLED | OUTPATIENT
Start: 2024-05-03

## 2024-04-05 RX ORDER — HEPARIN 100 UNIT/ML
500 SYRINGE INTRAVENOUS
Status: DISCONTINUED | OUTPATIENT
Start: 2024-04-05 | End: 2024-04-05 | Stop reason: HOSPADM

## 2024-04-05 RX ORDER — DIPHENHYDRAMINE HYDROCHLORIDE 50 MG/ML
25 INJECTION INTRAMUSCULAR; INTRAVENOUS ONCE AS NEEDED
Status: DISCONTINUED | OUTPATIENT
Start: 2024-04-05 | End: 2024-04-05 | Stop reason: HOSPADM

## 2024-04-05 RX ORDER — SODIUM CHLORIDE 0.9 % (FLUSH) 0.9 %
10 SYRINGE (ML) INJECTION
Status: CANCELLED | OUTPATIENT
Start: 2024-05-03

## 2024-04-05 RX ADMIN — VEDOLIZUMAB 300 MG: 300 INJECTION, POWDER, LYOPHILIZED, FOR SOLUTION INTRAVENOUS at 09:04

## 2024-04-05 RX ADMIN — SODIUM CHLORIDE: 9 INJECTION, SOLUTION INTRAVENOUS at 09:04

## 2024-04-05 NOTE — PLAN OF CARE
Plan of care reviewed with patient; patient in agreement. Entyvio Q4W infused. Appts reviewed; patient uses portal.

## 2024-04-30 ENCOUNTER — OFFICE VISIT (OUTPATIENT)
Dept: FAMILY MEDICINE | Facility: CLINIC | Age: 49
End: 2024-04-30
Payer: MEDICARE

## 2024-04-30 ENCOUNTER — PATIENT MESSAGE (OUTPATIENT)
Dept: FAMILY MEDICINE | Facility: CLINIC | Age: 49
End: 2024-04-30

## 2024-04-30 VITALS
DIASTOLIC BLOOD PRESSURE: 78 MMHG | HEIGHT: 70 IN | SYSTOLIC BLOOD PRESSURE: 127 MMHG | OXYGEN SATURATION: 98 % | WEIGHT: 235 LBS | TEMPERATURE: 98 F | BODY MASS INDEX: 33.64 KG/M2 | HEART RATE: 68 BPM

## 2024-04-30 DIAGNOSIS — R79.89 LOW TESTOSTERONE: ICD-10-CM

## 2024-04-30 DIAGNOSIS — I10 PRIMARY HYPERTENSION: ICD-10-CM

## 2024-04-30 DIAGNOSIS — E11.9 TYPE 2 DIABETES MELLITUS WITHOUT COMPLICATION, WITHOUT LONG-TERM CURRENT USE OF INSULIN: Primary | ICD-10-CM

## 2024-04-30 DIAGNOSIS — E03.4 HYPOTHYROIDISM DUE TO ACQUIRED ATROPHY OF THYROID: ICD-10-CM

## 2024-04-30 LAB
EST. AVERAGE GLUCOSE BLD GHB EST-MCNC: 162.8 MG/DL
HBA1C MFR BLD: 7.3 %
T4 FREE SERPL-MCNC: 1.04 NG/DL (ref 0.7–1.48)
TESTOST SERPL-MCNC: 284.35 NG/DL (ref 240.24–870.68)
TSH SERPL-ACNC: 0.16 UIU/ML (ref 0.35–4.94)

## 2024-04-30 PROCEDURE — 4010F ACE/ARB THERAPY RXD/TAKEN: CPT | Mod: CPTII,,, | Performed by: STUDENT IN AN ORGANIZED HEALTH CARE EDUCATION/TRAINING PROGRAM

## 2024-04-30 PROCEDURE — 3051F HG A1C>EQUAL 7.0%<8.0%: CPT | Mod: CPTII,,, | Performed by: STUDENT IN AN ORGANIZED HEALTH CARE EDUCATION/TRAINING PROGRAM

## 2024-04-30 PROCEDURE — 99214 OFFICE O/P EST MOD 30 MIN: CPT | Mod: S$PBB,,, | Performed by: STUDENT IN AN ORGANIZED HEALTH CARE EDUCATION/TRAINING PROGRAM

## 2024-04-30 PROCEDURE — 3078F DIAST BP <80 MM HG: CPT | Mod: CPTII,,, | Performed by: STUDENT IN AN ORGANIZED HEALTH CARE EDUCATION/TRAINING PROGRAM

## 2024-04-30 PROCEDURE — 36415 COLL VENOUS BLD VENIPUNCTURE: CPT | Performed by: STUDENT IN AN ORGANIZED HEALTH CARE EDUCATION/TRAINING PROGRAM

## 2024-04-30 PROCEDURE — 84443 ASSAY THYROID STIM HORMONE: CPT | Performed by: STUDENT IN AN ORGANIZED HEALTH CARE EDUCATION/TRAINING PROGRAM

## 2024-04-30 PROCEDURE — 84403 ASSAY OF TOTAL TESTOSTERONE: CPT | Performed by: STUDENT IN AN ORGANIZED HEALTH CARE EDUCATION/TRAINING PROGRAM

## 2024-04-30 PROCEDURE — 1159F MED LIST DOCD IN RCRD: CPT | Mod: CPTII,,, | Performed by: STUDENT IN AN ORGANIZED HEALTH CARE EDUCATION/TRAINING PROGRAM

## 2024-04-30 PROCEDURE — 99213 OFFICE O/P EST LOW 20 MIN: CPT | Mod: PBBFAC,PN | Performed by: STUDENT IN AN ORGANIZED HEALTH CARE EDUCATION/TRAINING PROGRAM

## 2024-04-30 PROCEDURE — 83036 HEMOGLOBIN GLYCOSYLATED A1C: CPT | Performed by: STUDENT IN AN ORGANIZED HEALTH CARE EDUCATION/TRAINING PROGRAM

## 2024-04-30 PROCEDURE — 3008F BODY MASS INDEX DOCD: CPT | Mod: CPTII,,, | Performed by: STUDENT IN AN ORGANIZED HEALTH CARE EDUCATION/TRAINING PROGRAM

## 2024-04-30 PROCEDURE — 3074F SYST BP LT 130 MM HG: CPT | Mod: CPTII,,, | Performed by: STUDENT IN AN ORGANIZED HEALTH CARE EDUCATION/TRAINING PROGRAM

## 2024-04-30 PROCEDURE — 84439 ASSAY OF FREE THYROXINE: CPT | Performed by: STUDENT IN AN ORGANIZED HEALTH CARE EDUCATION/TRAINING PROGRAM

## 2024-04-30 RX ORDER — LEVOTHYROXINE SODIUM 125 UG/1
125 TABLET ORAL
Qty: 30 TABLET | Refills: 11 | Status: SHIPPED | OUTPATIENT
Start: 2024-04-30 | End: 2025-04-30

## 2024-04-30 RX ORDER — SEMAGLUTIDE 2.68 MG/ML
2 INJECTION, SOLUTION SUBCUTANEOUS
Qty: 3 ML | Refills: 11 | Status: SHIPPED | OUTPATIENT
Start: 2024-04-30 | End: 2025-04-30

## 2024-04-30 NOTE — PROGRESS NOTES
Patient Name: Sheila Hamilton     : 1975    MRN: [id number]     Subjective:     Patient ID: Sheila Hamilton is a 48 y.o. male.    Chief Complaint:   Chief Complaint   Patient presents with    Follow-up     Patient here for follow up. No problems. /78        HPI: HPI  48-year-old male presents to clinic for follow-up of diabetes       Patient is to undergo EGD for n/v. Unsure if related to Ozempic  Crohn's disease  sees Dr. Trent has infusion every month at [facility] has an ileostomy  Protonix 40 meq daily    Elevated Liver Enzymes  Noted previously. Also states his cardiologist asked him to discuss this with PCP  Hep panel negative 2022  Patient reports going to his gastrointestinal doctor who asked him to lose weight to help his elevated liver enzymes secondary to fatty liver disease      Hypothyroidism  takes levothyroxine 137 mcg   TSH and T4 WNL  Needs repeat today    diabetes  A1c 7.3  A1c 24 7.5   Janumet  mg twice daily  Ozempic1 mg qweek  Needs A1c today. Reports eating a lot of candy lately as it has been on clearance    Anxiety  Sees a psychiatrist who has prescribed diazepam 10 mg 3 times daily  Bupropion 150 mg twice daily  Trileptal 300 mg twice daily  Trazodone 50 mg at bedtime  Bsupar 5 mg   Abilify 10 mg       Reactive airway disease  Uses albuterol as needed  Also uses Flonase twice daily for allergies    Hypertension  Losartan 25 mg  /78  History of stent placement in legs  sees [facility]    HLD  Rosuvastatin 10 at bedtime    Low testosterone  testosterone 200 mg injections Patient reports that he takes every 14 days week.  Long discussion as patient receiving these injections from provider in [place]. Does not manage just gives injections.        ROS:      ROS     12 point review of systems conducted, negative except as stated in the history of present illness. See HPI for details.    History:     Past Medical History:   Diagnosis Date    Anxiety      Depression     Diabetes mellitus, type 2     GERD (gastroesophageal reflux disease)     Hypothyroidism         Past Surgical History:   Procedure Laterality Date    CHOLECYSTECTOMY      HERNIA REPAIR      ILEOSTOMY Left        Family History   Problem Relation Name Age of Onset    Cancer Mother          Social History     Tobacco Use    Smoking status: Never     Passive exposure: Never    Smokeless tobacco: Never   Substance and Sexual Activity    Alcohol use: Not Currently    Drug use: Never    Sexual activity: Yes     Partners: Female       Current Outpatient Medications   Medication Instructions    albuterol (PROVENTIL/VENTOLIN HFA) 90 mcg/actuation inhaler No dose, route, or frequency recorded.    ARIPiprazole (ABILIFY) 10 mg, Oral, Daily    azelastine (ASTELIN) 137 mcg (0.1 %) nasal spray No dose, route, or frequency recorded.    blood sugar diagnostic Strp 100 each, Misc.(Non-Drug; Combo Route), 3 times daily    blood-glucose meter kit Use as instructed    buPROPion (WELLBUTRIN SR) 150 mg, Oral, 2 times daily    busPIRone (BUSPAR) 7.5 mg, Oral, 3 times daily    cetirizine (ZYRTEC) 10 mg, Oral    clopidogreL (PLAVIX) 75 mg, Oral    dicyclomine (BENTYL) 20 mg, Oral, 3 times daily    EScitalopram oxalate (LEXAPRO) 10 mg, Oral    fluconazole (DIFLUCAN) 150 MG Tab No dose, route, or frequency recorded.    fluconazole (DIFLUCAN) 150 MG Tab Take one tablet by mouth today and one in three days    fluticasone propionate (FLONASE) 50 mcg/actuation nasal spray 2 sprays    furosemide (LASIX) 40 mg, Oral, Daily    HYDROcodone-acetaminophen (NORCO) 5-325 mg per tablet 1 tablet, Oral, Every 4 hours PRN    levothyroxine (SYNTHROID) 125 mcg, Oral, Before breakfast    losartan (COZAAR) 25 mg, Oral, Daily    ondansetron (ZOFRAN-ODT) 4 mg, Every 8 hours PRN    OXcarbazepine (TRILEPTAL) 300 mg, Oral, 2 times daily    oxyCODONE-acetaminophen (PERCOCET)  mg per tablet 1 tablet, Oral, Every 4 hours PRN    OZEMPIC 2 mg,  "Subcutaneous, Every 7 days    pantoprazole (PROTONIX) 40 mg, Oral, Daily    potassium chloride (MICRO-K) 10 MEQ CpSR 20 mEq    promethazine (PHENERGAN) 25 mg, Oral, Every 6 hours PRN    rosuvastatin (CRESTOR) 10 mg, Oral, Nightly    SITagliptan-metformin (JANUMET) 50-1,000 mg per tablet 1 tablet, Oral, 2 times daily with meals    testosterone cypionate (DEPOTESTOTERONE CYPIONATE) 100 mg, Intramuscular, Every 14 days    traMADoL (ULTRAM) 50 mg tablet 1 tablet as needed Orally every 6 hours for 3 days    traZODone (DESYREL) 100 mg, Oral        Review of patient's allergies indicates:   Allergen Reactions    Bee venom protein (honey bee)     Insect venom        Objective:     Visit Vitals  /78 (BP Location: Left arm, Patient Position: Sitting)   Pulse 68   Temp 97.9 °F (36.6 °C) (Oral)   Ht 5' 10" (1.778 m)   Wt 106.6 kg (235 lb)   SpO2 98%   BMI 33.72 kg/m²       Physical Examination:     Physical Exam  Constitutional:       General: He is not in acute distress.     Appearance: Normal appearance. He is not ill-appearing or diaphoretic.   HENT:      Nose: No congestion.   Eyes:      Conjunctiva/sclera: Conjunctivae normal.      Pupils: Pupils are equal, round, and reactive to light.   Cardiovascular:      Rate and Rhythm: Normal rate and regular rhythm.      Heart sounds: No murmur heard.  Pulmonary:      Effort: Pulmonary effort is normal. No respiratory distress.      Breath sounds: Normal breath sounds. No wheezing.   Musculoskeletal:         General: No swelling, tenderness, deformity or signs of injury. Normal range of motion.      Cervical back: Normal range of motion.   Skin:     General: Skin is warm and dry.      Coloration: Skin is not jaundiced.   Neurological:      General: No focal deficit present.      Mental Status: He is alert and oriented to person, place, and time. Mental status is at baseline.      Gait: Gait normal.         Lab Results:     Chemistry:  Lab Results   Component Value Date    NA " 142 01/30/2024    K 4.2 01/30/2024    CHLORIDE 108 (H) 01/30/2024    BUN 12.6 01/30/2024    CREATININE 1.22 (H) 01/30/2024    EGFRNORACEVR >60 01/30/2024    GLUCOSE 161 (H) 01/30/2024    CALCIUM 9.0 01/30/2024    ALKPHOS 70 01/30/2024    LABPROT 7.7 01/30/2024    ALBUMIN 4.1 01/30/2024    BILIDIR 0.4 04/22/2022    IBILI 0.60 04/22/2022    AST 27 01/30/2024    ALT 36 01/30/2024    TSH 0.164 (L) 04/30/2024    GOSPQI4AFPU 1.04 04/30/2024    PSA 1.81 09/28/2023        Lab Results   Component Value Date    HGBA1C 7.3 (H) 04/30/2024        Hematology:  Lab Results   Component Value Date    WBC 7.57 01/30/2024    HGB 12.4 (L) 01/30/2024    HCT 40.2 (L) 01/30/2024     01/30/2024       Lipid Panel:  Lab Results   Component Value Date    CHOL 85 01/30/2024    HDL 27 (L) 01/30/2024    LDL 30.00 (L) 01/30/2024    TRIG 142 (H) 01/30/2024    TOTALCHOLEST 3 01/30/2024        Urine:  Lab Results   Component Value Date    COLORUA Yellow 09/28/2023    APPEARANCEUA Turbid (A) 09/28/2023    SGUA 1.021 09/28/2023    PHUA 5.5 09/28/2023    PROTEINUA Trace (A) 09/28/2023    GLUCOSEUA 4+ (A) 09/28/2023    KETONESUA Trace (A) 09/28/2023    BLOODUA Negative 09/28/2023    NITRITESUA 2+ (A) 09/28/2023    LEUKOCYTESUR 500 (A) 09/28/2023    RBCUA 0-5 09/28/2023    WBCUA >100 (A) 09/28/2023    BACTERIA Trace (A) 09/28/2023    SQEPUA Trace (A) 09/28/2023    HYALINECASTS None Seen 09/28/2023    CREATRANDUR 183.8 (H) 06/27/2023        Assessment:          ICD-10-CM ICD-9-CM   1. Type 2 diabetes mellitus without complication, without long-term current use of insulin  E11.9 250.00   2. Hypothyroidism due to acquired atrophy of thyroid  E03.4 244.8     246.8   3. Low testosterone  R79.89 790.99   4. Primary hypertension  I10 401.9        Plan:     1. Type 2 diabetes mellitus without complication, without long-term current use of insulin  Assessment & Plan:  A1c today 7.3  Referring patient to DE  Encouraged to work on diet and  exercise.  Increasing Ozempic to 2 mg q 7 days  Continue sitagliptin metformin 50 /1000 b.i.d.    Orders:  -     Hemoglobin A1C; Future; Expected date: 04/30/2024  -     semaglutide (OZEMPIC) 2 mg/dose (8 mg/3 mL) PnIj; Inject 2 mg into the skin every 7 days.  Dispense: 3 mL; Refill: 11  -     Ambulatory referral/consult to Diabetes Education; Future; Expected date: 05/07/2024    2. Hypothyroidism due to acquired atrophy of thyroid  -     TSH; Future; Expected date: 04/30/2024  -     T4, Free; Future; Expected date: 04/30/2024  -     levothyroxine (SYNTHROID) 125 MCG tablet; Take 1 tablet (125 mcg total) by mouth before breakfast.  Dispense: 30 tablet; Refill: 11    3. Low testosterone  Assessment & Plan:  Testosterone in range. Continue current dosage.     Orders:  -     Testosterone    4. Primary hypertension  Assessment & Plan:  Patient BP at goal of 127/78    Continue Losartan 25 mg daily.            Follow up in about 3 months (around 7/30/2024) for Hypertension, hypothyroid.    Future Appointments   Date Time Provider Department Center   5/3/2024  9:00 AM CHAIR 36, [facility]   6/3/2024  9:00 AM CHAIR 23, [facility]   7/1/2024  9:00 AM CHAIR 18, [facility]   7/31/2024  8:40 AM Alena Mcqueen MD [facility]        Alena Mcqueen MD

## 2024-04-30 NOTE — ASSESSMENT & PLAN NOTE
A1c today 7.3  Referring patient to DE  Encouraged to work on diet and exercise.  Increasing Ozempic to 2 mg q 7 days  Continue sitagliptin metformin 50 /1000 b.i.d.

## 2024-05-03 ENCOUNTER — INFUSION (OUTPATIENT)
Dept: INFUSION THERAPY | Facility: HOSPITAL | Age: 49
End: 2024-05-03
Attending: INTERNAL MEDICINE
Payer: MEDICARE

## 2024-05-03 VITALS
WEIGHT: 233.81 LBS | DIASTOLIC BLOOD PRESSURE: 77 MMHG | HEIGHT: 70 IN | OXYGEN SATURATION: 97 % | BODY MASS INDEX: 33.47 KG/M2 | TEMPERATURE: 99 F | RESPIRATION RATE: 16 BRPM | SYSTOLIC BLOOD PRESSURE: 123 MMHG | HEART RATE: 89 BPM

## 2024-05-03 DIAGNOSIS — K50.919 CROHN'S DISEASE WITH COMPLICATION, UNSPECIFIED GASTROINTESTINAL TRACT LOCATION: Primary | ICD-10-CM

## 2024-05-03 PROCEDURE — 25000003 PHARM REV CODE 250: Performed by: INTERNAL MEDICINE

## 2024-05-03 PROCEDURE — 63600175 PHARM REV CODE 636 W HCPCS: Mod: JZ,JG | Performed by: INTERNAL MEDICINE

## 2024-05-03 PROCEDURE — 96365 THER/PROPH/DIAG IV INF INIT: CPT

## 2024-05-03 RX ORDER — DIPHENHYDRAMINE HYDROCHLORIDE 50 MG/ML
25 INJECTION INTRAMUSCULAR; INTRAVENOUS ONCE AS NEEDED
Status: DISCONTINUED | OUTPATIENT
Start: 2024-05-03 | End: 2024-05-03 | Stop reason: HOSPADM

## 2024-05-03 RX ORDER — METHYLPREDNISOLONE SOD SUCC 125 MG
40 VIAL (EA) INJECTION
Status: CANCELLED | OUTPATIENT
Start: 2024-05-31

## 2024-05-03 RX ORDER — SODIUM CHLORIDE 0.9 % (FLUSH) 0.9 %
10 SYRINGE (ML) INJECTION
Status: DISCONTINUED | OUTPATIENT
Start: 2024-05-03 | End: 2024-05-03 | Stop reason: HOSPADM

## 2024-05-03 RX ORDER — ACETAMINOPHEN 325 MG/1
650 TABLET ORAL
Status: ACTIVE | OUTPATIENT
Start: 2024-05-03 | End: 2024-05-03

## 2024-05-03 RX ORDER — HEPARIN 100 UNIT/ML
500 SYRINGE INTRAVENOUS
Status: CANCELLED | OUTPATIENT
Start: 2024-05-31

## 2024-05-03 RX ORDER — EPINEPHRINE 1 MG/ML
0.3 INJECTION, SOLUTION, CONCENTRATE INTRAVENOUS
Status: ACTIVE | OUTPATIENT
Start: 2024-05-03 | End: 2024-05-03

## 2024-05-03 RX ORDER — EPINEPHRINE 1 MG/ML
0.3 INJECTION, SOLUTION, CONCENTRATE INTRAVENOUS
Status: CANCELLED | OUTPATIENT
Start: 2024-05-31

## 2024-05-03 RX ORDER — ACETAMINOPHEN 325 MG/1
650 TABLET ORAL
Status: CANCELLED | OUTPATIENT
Start: 2024-05-31

## 2024-05-03 RX ORDER — SODIUM CHLORIDE 0.9 % (FLUSH) 0.9 %
10 SYRINGE (ML) INJECTION
Status: CANCELLED | OUTPATIENT
Start: 2024-05-31

## 2024-05-03 RX ORDER — HEPARIN 100 UNIT/ML
500 SYRINGE INTRAVENOUS
Status: DISCONTINUED | OUTPATIENT
Start: 2024-05-03 | End: 2024-05-03 | Stop reason: HOSPADM

## 2024-05-03 RX ORDER — DIPHENHYDRAMINE HYDROCHLORIDE 50 MG/ML
25 INJECTION INTRAMUSCULAR; INTRAVENOUS ONCE AS NEEDED
Status: CANCELLED | OUTPATIENT
Start: 2024-05-31

## 2024-05-03 RX ADMIN — VEDOLIZUMAB 300 MG: 300 INJECTION, POWDER, LYOPHILIZED, FOR SOLUTION INTRAVENOUS at 08:05

## 2024-05-03 RX ADMIN — SODIUM CHLORIDE: 9 INJECTION, SOLUTION INTRAVENOUS at 08:05

## 2024-05-03 NOTE — PLAN OF CARE
Plan of care reviewed with patient; patient in agreement. Entyvio Q4W completed. Appts reviewed and printed for patient.

## 2024-05-07 ENCOUNTER — ANESTHESIA EVENT (OUTPATIENT)
Dept: ENDOSCOPY | Facility: HOSPITAL | Age: 49
End: 2024-05-07
Payer: MEDICARE

## 2024-05-07 ENCOUNTER — ANESTHESIA (OUTPATIENT)
Dept: ENDOSCOPY | Facility: HOSPITAL | Age: 49
End: 2024-05-07
Payer: MEDICARE

## 2024-05-07 ENCOUNTER — HOSPITAL ENCOUNTER (OUTPATIENT)
Facility: HOSPITAL | Age: 49
Discharge: HOME | End: 2024-05-07
Attending: INTERNAL MEDICINE | Admitting: INTERNAL MEDICINE
Payer: MEDICARE

## 2024-05-07 VITALS
WEIGHT: 233 LBS | HEIGHT: 70 IN | SYSTOLIC BLOOD PRESSURE: 109 MMHG | BODY MASS INDEX: 33.36 KG/M2 | OXYGEN SATURATION: 99 % | DIASTOLIC BLOOD PRESSURE: 79 MMHG | RESPIRATION RATE: 15 BRPM | HEART RATE: 84 BPM | TEMPERATURE: 97 F

## 2024-05-07 DIAGNOSIS — K21.9 GERD WITHOUT ESOPHAGITIS: ICD-10-CM

## 2024-05-07 DIAGNOSIS — R11.2 NAUSEA AND VOMITING, UNSPECIFIED VOMITING TYPE: ICD-10-CM

## 2024-05-07 LAB — POCT GLUCOSE: 151 MG/DL (ref 70–110)

## 2024-05-07 PROCEDURE — D9220A PRA ANESTHESIA: Mod: CRNA,,, | Performed by: NURSE ANESTHETIST, CERTIFIED REGISTERED

## 2024-05-07 PROCEDURE — 88305 TISSUE EXAM BY PATHOLOGIST: CPT | Performed by: INTERNAL MEDICINE

## 2024-05-07 PROCEDURE — 25000003 PHARM REV CODE 250: Performed by: NURSE ANESTHETIST, CERTIFIED REGISTERED

## 2024-05-07 PROCEDURE — 63600175 PHARM REV CODE 636 W HCPCS: Performed by: NURSE ANESTHETIST, CERTIFIED REGISTERED

## 2024-05-07 PROCEDURE — 43239 EGD BIOPSY SINGLE/MULTIPLE: CPT | Performed by: INTERNAL MEDICINE

## 2024-05-07 PROCEDURE — 37000008 HC ANESTHESIA 1ST 15 MINUTES: Performed by: INTERNAL MEDICINE

## 2024-05-07 PROCEDURE — 88312 SPECIAL STAINS GROUP 1: CPT

## 2024-05-07 PROCEDURE — D9220A PRA ANESTHESIA: Mod: ANES,,, | Performed by: STUDENT IN AN ORGANIZED HEALTH CARE EDUCATION/TRAINING PROGRAM

## 2024-05-07 PROCEDURE — 88313 SPECIAL STAINS GROUP 2: CPT

## 2024-05-07 PROCEDURE — 25000003 PHARM REV CODE 250: Performed by: INTERNAL MEDICINE

## 2024-05-07 PROCEDURE — 37000009 HC ANESTHESIA EA ADD 15 MINS: Performed by: INTERNAL MEDICINE

## 2024-05-07 RX ORDER — SODIUM CHLORIDE, SODIUM GLUCONATE, SODIUM ACETATE, POTASSIUM CHLORIDE AND MAGNESIUM CHLORIDE 30; 37; 368; 526; 502 MG/100ML; MG/100ML; MG/100ML; MG/100ML; MG/100ML
INJECTION, SOLUTION INTRAVENOUS CONTINUOUS
Status: DISCONTINUED | OUTPATIENT
Start: 2024-05-07 | End: 2024-05-07 | Stop reason: HOSPADM

## 2024-05-07 RX ORDER — LIDOCAINE HYDROCHLORIDE 20 MG/ML
INJECTION, SOLUTION EPIDURAL; INFILTRATION; INTRACAUDAL; PERINEURAL
Status: DISCONTINUED
Start: 2024-05-07 | End: 2024-05-07 | Stop reason: HOSPADM

## 2024-05-07 RX ORDER — LIDOCAINE HYDROCHLORIDE 20 MG/ML
INJECTION INTRAVENOUS
Status: DISCONTINUED | OUTPATIENT
Start: 2024-05-07 | End: 2024-05-07

## 2024-05-07 RX ORDER — GLYCOPYRROLATE 0.2 MG/ML
INJECTION INTRAMUSCULAR; INTRAVENOUS
Status: DISCONTINUED
Start: 2024-05-07 | End: 2024-05-07 | Stop reason: HOSPADM

## 2024-05-07 RX ORDER — PROPOFOL 10 MG/ML
VIAL (ML) INTRAVENOUS
Status: DISCONTINUED
Start: 2024-05-07 | End: 2024-05-07 | Stop reason: HOSPADM

## 2024-05-07 RX ORDER — PROPOFOL 10 MG/ML
INJECTION, EMULSION INTRAVENOUS
Status: DISCONTINUED | OUTPATIENT
Start: 2024-05-07 | End: 2024-05-07

## 2024-05-07 RX ORDER — GLYCOPYRROLATE 0.2 MG/ML
INJECTION INTRAMUSCULAR; INTRAVENOUS
Status: DISCONTINUED | OUTPATIENT
Start: 2024-05-07 | End: 2024-05-07

## 2024-05-07 RX ADMIN — PROPOFOL 20 MG: 10 INJECTION, EMULSION INTRAVENOUS at 07:05

## 2024-05-07 RX ADMIN — GLYCOPYRROLATE 0.2 MG: 0.2 INJECTION INTRAMUSCULAR; INTRAVENOUS at 07:05

## 2024-05-07 RX ADMIN — SODIUM CHLORIDE, SODIUM GLUCONATE, SODIUM ACETATE, POTASSIUM CHLORIDE AND MAGNESIUM CHLORIDE: 526; 502; 368; 37; 30 INJECTION, SOLUTION INTRAVENOUS at 07:05

## 2024-05-07 RX ADMIN — LIDOCAINE HYDROCHLORIDE 100 MG: 20 INJECTION INTRAVENOUS at 07:05

## 2024-05-07 RX ADMIN — PROPOFOL 100 MG: 10 INJECTION, EMULSION INTRAVENOUS at 07:05

## 2024-05-07 NOTE — ANESTHESIA PREPROCEDURE EVALUATION
05/07/2024  Sheila Hamilton is a 48 y.o., male.        Tte nl lvef  12 / 40 / 238k  142, K 4.2, Cr 1.22    Nausea/vomiting, GERD    Pre-op Assessment    I have reviewed the Patient Summary Reports.     I have reviewed the Nursing Notes. I have reviewed the NPO Status.   I have reviewed the Medications.     Review of Systems  Anesthesia Hx:               Denies Personal Hx of Anesthesia complications.                    Cardiovascular:     Hypertension                                        Hepatic/GI:     GERD Liver Disease,            Neurological:  Neurology Normal                                      Endocrine:  Diabetes Hypothyroidism        Obesity / BMI > 30  Psych:  Psychiatric History  depression                Physical Exam  General: Well nourished, Cooperative and Alert    Airway:  Mallampati: II   Mouth Opening: Normal  TM Distance: Normal  Tongue: Normal  Neck: Girth Increased    Chest/Lungs:  Normal Respiratory Rate        Anesthesia Plan  Type of Anesthesia, risks & benefits discussed:    Anesthesia Type: Gen Natural Airway  Intra-op Monitoring Plan: Standard ASA Monitors  Post Op Pain Control Plan: IV/PO Opioids PRN  Induction:  IV  Informed Consent: Informed consent signed with the Patient and all parties understand the risks and agree with anesthesia plan.  All questions answered.   ASA Score: 2  Day of Surgery Review of History & Physical: H&P Update referred to the surgeon/provider.    Ready For Surgery From Anesthesia Perspective.     .

## 2024-05-07 NOTE — PROVATION PATIENT INSTRUCTIONS
Discharge Summary/Instructions after an Endoscopic Procedure  Patient Name: Sheila Hamilton  Patient MRN: [id number]  Patient YOB: 1975  Tuesday, May 7, 2024  Lawrence Trent MD  Dear patient,  As a result of recent federal legislation (The Federal Cures Act), you may   receive lab or pathology results from your procedure in your [facility]   account before your physician is able to contact you. Your physician or   their representative will relay the results to you with their   recommendations at their soonest availability.  Thank you,  RESTRICTIONS:  During your procedure today, you received medications for sedation.  These   medications may affect your judgment, balance and coordination.  Therefore,   for 24 hours, you have the following restrictions:   - DO NOT drive a car, operate machinery, make legal/financial decisions,   sign important papers or drink alcohol.    ACTIVITY:  Today: no heavy lifting, straining or running due to procedural   sedation/anesthesia.  The following day: return to full activity including work.  DIET:  Eat and drink normally unless instructed otherwise.     TREATMENT FOR COMMON SIDE EFFECTS:  - Mild abdominal pain, nausea, belching, bloating or excessive gas:  rest,   eat lightly and use a heating pad.  - Sore Throat: treat with throat lozenges and/or gargle with warm salt   water.  - Because air was used during the procedure, expelling large amounts of air   from your rectum or belching is normal.  - If a bowel prep was taken, you may not have a bowel movement for 1-3 days.    This is normal.  SYMPTOMS TO WATCH FOR AND REPORT TO YOUR PHYSICIAN:  1. Abdominal pain or bloating, other than gas cramps.  2. Chest pain.  3. Back pain.  4. Signs of infection such as: chills or fever occurring within 24 hours   after the procedure.  5. Rectal bleeding, which would show as bright red, maroon, or black stools.   (A tablespoon of blood from the rectum is not serious, especially if    hemorrhoids are present.)  6. Vomiting.  7. Weakness or dizziness.  GO DIRECTLY TO THE NEAREST EMERGENCY ROOM IF YOU HAVE ANY OF THE FOLLOWING:      Difficulty breathing              Chills and/or fever over 101 F   Persistent vomiting and/or vomiting blood   Severe abdominal pain   Severe chest pain   Black, tarry stools   Bleeding- more than one tablespoon   Any other symptom or condition that you feel may need urgent attention  Your doctor recommends these additional instructions:  If any biopsies were taken, your doctors clinic will contact you in 1 to 2   weeks with any results.  - Discharge patient to home (ambulatory).   - Resume previous diet.   - Continue current medications  - Plan CTE as next step  - Continue entyvio  For questions, problems or results please call your physician - Lawrence Trent MD at Work:  [phone].  [facility], EMERGENCY ROOM PHONE NUMBER: [phone]  IF A COMPLICATION OR EMERGENCY SITUATION ARISES AND YOU ARE UNABLE TO REACH   YOUR PHYSICIAN - GO DIRECTLY TO THE EMERGENCY ROOM.  Lawrence Trent MD  5/7/2024 8:04:21 AM  This report has been verified and signed electronically.  Dear patient,  As a result of recent federal legislation (The Federal Cures Act), you may   receive lab or pathology results from your procedure in your [facility]   account before your physician is able to contact you. Your physician or   their representative will relay the results to you with their   recommendations at their soonest availability.  Thank you,  PROVATION

## 2024-05-07 NOTE — PROVATION PATIENT INSTRUCTIONS
Discharge Summary/Instructions after an Endoscopic Procedure  Patient Name: Sheila Hamilton  Patient MRN: [id number]  Patient YOB: 1975  Tuesday, May 7, 2024  Lawrence Trent MD  Dear patient,  As a result of recent federal legislation (The Federal Cures Act), you may   receive lab or pathology results from your procedure in your [facility]   account before your physician is able to contact you. Your physician or   their representative will relay the results to you with their   recommendations at their soonest availability.  Thank you,  RESTRICTIONS:  During your procedure today, you received medications for sedation.  These   medications may affect your judgment, balance and coordination.  Therefore,   for 24 hours, you have the following restrictions:   - DO NOT drive a car, operate machinery, make legal/financial decisions,   sign important papers or drink alcohol.    ACTIVITY:  Today: no heavy lifting, straining or running due to procedural   sedation/anesthesia.  The following day: return to full activity including work.  DIET:  Eat and drink normally unless instructed otherwise.     TREATMENT FOR COMMON SIDE EFFECTS:  - Mild abdominal pain, nausea, belching, bloating or excessive gas:  rest,   eat lightly and use a heating pad.  - Sore Throat: treat with throat lozenges and/or gargle with warm salt   water.  - Because air was used during the procedure, expelling large amounts of air   from your rectum or belching is normal.  - If a bowel prep was taken, you may not have a bowel movement for 1-3 days.    This is normal.  SYMPTOMS TO WATCH FOR AND REPORT TO YOUR PHYSICIAN:  1. Abdominal pain or bloating, other than gas cramps.  2. Chest pain.  3. Back pain.  4. Signs of infection such as: chills or fever occurring within 24 hours   after the procedure.  5. Rectal bleeding, which would show as bright red, maroon, or black stools.   (A tablespoon of blood from the rectum is not serious, especially if    hemorrhoids are present.)  6. Vomiting.  7. Weakness or dizziness.  GO DIRECTLY TO THE NEAREST EMERGENCY ROOM IF YOU HAVE ANY OF THE FOLLOWING:      Difficulty breathing              Chills and/or fever over 101 F   Persistent vomiting and/or vomiting blood   Severe abdominal pain   Severe chest pain   Black, tarry stools   Bleeding- more than one tablespoon   Any other symptom or condition that you feel may need urgent attention  Your doctor recommends these additional instructions:  If any biopsies were taken, your doctors clinic will contact you in 1 to 2   weeks with any results.  - Discharge patient to home (ambulatory).   - Follow up pathology  - Proceed with ileoscopy  For questions, problems or results please call your physician - Lawrence Trent MD at Work:  [phone].  [facility], EMERGENCY ROOM PHONE NUMBER: [phone]  IF A COMPLICATION OR EMERGENCY SITUATION ARISES AND YOU ARE UNABLE TO REACH   YOUR PHYSICIAN - GO DIRECTLY TO THE EMERGENCY ROOM.  Lawrence Trent MD  5/7/2024 7:57:01 AM  This report has been verified and signed electronically.  Dear patient,  As a result of recent federal legislation (The Federal Cures Act), you may   receive lab or pathology results from your procedure in your [facility]   account before your physician is able to contact you. Your physician or   their representative will relay the results to you with their   recommendations at their soonest availability.  Thank you,  PROVATION

## 2024-05-07 NOTE — TRANSFER OF CARE
"Anesthesia Transfer of Care Note    Patient: Sheila Hamilton    Procedure(s) Performed: Procedure(s) (LRB):  EGD with biopsy (N/A)  ileoscopy (N/A)    Patient location: PACU    Anesthesia Type: MAC    Transport from OR: Transported from OR on room air with adequate spontaneous ventilation    Post pain: adequate analgesia    Post assessment: no apparent anesthetic complications and tolerated procedure well    Post vital signs: stable    Level of consciousness: responds to stimulation    Nausea/Vomiting: no nausea/vomiting    Complications: none    Transfer of care protocol was followed      Last vitals: Visit Vitals  /63 (BP Location: Left arm, Patient Position: Lying)   Pulse 77   Temp 36.5 °C (97.7 °F)   Resp 16   Ht 5' 10" (1.778 m)   Wt 105.7 kg (233 lb)   SpO2 (!) 94%   BMI 33.43 kg/m²     "

## 2024-05-07 NOTE — H&P
"[facility]    CC: Crohns    HPI 48 y.o. male here with known history of crohn's on entyvio.  Also with new onset morningtime nausea and vomiting associated with starting ozempic.  No other new complaints.     Past Medical History  Past Medical History:   Diagnosis Date    Anxiety     Crohn's colitis     Depression     Diabetes mellitus, type 2     GERD (gastroesophageal reflux disease)     Hypothyroidism          Review of Systems  General ROS: negative for chills, fever or weight loss  Cardiovascular ROS: no chest pain or dyspnea on exertion  Gastrointestinal ROS: no abdominal pain, change in bowel habits, or black/ bloody stools    Physical Examination  /74   Pulse 78   Temp 97.5 °F (36.4 °C) (Skin)   Resp 18   Ht 5' 10" (1.778 m)   Wt 105.7 kg (233 lb)   SpO2 97%   BMI 33.43 kg/m²   General appearance: alert, cooperative, no distress  HENT: Normocephalic, atraumatic, neck symmetrical, no nasal discharge   Lungs: clear to auscultation bilaterally, no dullness to percussion bilaterally  Heart: regular rate and rhythm without rub; no displacement of the PMI   Abdomen: soft, non-tender; bowel sounds normoactive; no organomegaly  Extremities: extremities symmetric; no clubbing, cyanosis, or edema  Neurologic: Alert and oriented X 3, normal strength, normal coordination and gait    Labs:    Imaging:    I have personally reviewed and interpreted these images.    Assessment:   Crohn's/Nausea and vomiting on ozempic    Plan:  EGD/Colonoscopy    PHAM Trent Jr., M.D.  [facility]    "

## 2024-05-08 LAB — PSYCHE PATHOLOGY RESULT: NORMAL

## 2024-05-08 NOTE — ANESTHESIA POSTPROCEDURE EVALUATION
Anesthesia Post Evaluation    Patient: Sheila Hamilton    Procedure(s) Performed: Procedure(s) (LRB):  EGD with biopsy (N/A)  ileoscopy (N/A)    Final Anesthesia Type: general      Patient location during evaluation: PACU  Patient participation: Yes- Able to Participate  Level of consciousness: awake and alert  Post-procedure vital signs: reviewed and stable  Pain management: adequate  Airway patency: patent    PONV status at discharge: No PONV  Anesthetic complications: no      Respiratory status: unassisted  Hydration status: euvolemic  Follow-up not needed.              Vitals Value Taken Time   /79 05/07/24 0817   Temp 36 °C (96.8 °F) 05/07/24 0756   Pulse 84 05/07/24 0817   Resp 15 05/07/24 0817   SpO2 99 % 05/07/24 0817         No case tracking events are documented in the log.      Pain/Celena Score: Celena Score: 9 (5/7/2024  8:10 AM)

## 2024-05-14 ENCOUNTER — CLINICAL SUPPORT (OUTPATIENT)
Dept: DIABETES | Facility: CLINIC | Age: 49
End: 2024-05-14
Payer: MEDICARE

## 2024-05-14 VITALS — BODY MASS INDEX: 33.85 KG/M2 | WEIGHT: 235.88 LBS

## 2024-05-14 DIAGNOSIS — E11.9 TYPE 2 DIABETES MELLITUS WITHOUT COMPLICATION, WITHOUT LONG-TERM CURRENT USE OF INSULIN: ICD-10-CM

## 2024-05-14 PROCEDURE — G0108 DIAB MANAGE TRN  PER INDIV: HCPCS | Mod: PBBFAC,PN | Performed by: DIETITIAN, REGISTERED

## 2024-05-14 PROCEDURE — 99212 OFFICE O/P EST SF 10 MIN: CPT | Mod: PBBFAC,PN | Performed by: DIETITIAN, REGISTERED

## 2024-05-14 NOTE — PROGRESS NOTES
Diabetes Care Specialist Progress Note  Author: Trina Orellana RD  Date: 5/14/2024    Program Intake  Reason for Diabetes Program Visit:: Post Program Follow-Up  Type of Follow-Up: 12 month  Current diabetes risk level:: low  Continuous Glucose Monitoring  Patient has CGM: No    Lab Results   Component Value Date    HGBA1C 7.3 (H) 04/30/2024       Clinical    Weight: 107 kg (235 lb 14.4 oz)       Body mass index is 33.85 kg/m².         Problem Review  Reviewed Problem List with Patient: yes  Active comorbidities affecting diabetes self-care.: yes  Comorbidities: Gastrointestinal Disorder  Reviewed health maintenance: no (see comments)    Clinical Assessment  Current Diabetes Treatment: Oral Medication, Injectable (Ozempic 2 mg (will increase to 2 mg on 5/17/24 and Janumet 50mg/1000mg BID)    Medication Information  How do you obtain your medications?: Patient drives  How many days a week do you miss your medications?: Never  Do you sometimes have difficulty refilling your medications?: No  Medication adherence impacting ability to self-manage diabetes?: No    Labs  Do you have regular lab work to monitor your medications?: Yes  Type of Regular Lab Work: A1c  Where do you get your labs drawn?: [facility]  Lab Compliance Barriers: No    Nutritional Status  Diet: Regular  Meal Plan 24 Hour Recall: Breakfast, Dinner, Snack  Meal Plan 24 Hour Recall - Breakfast: oatmeal or cereal and milk and coffee with sugar  Meal Plan 24 Hour Recall - Dinner: chicken and rice or pasta  Meal Plan 24 Hour Recall - Snack: small piece of fruit (banana), Ritz crackers or yogurt. Drinks some soda and [place] fruit punch occasionally. Drinks mostly water with sugar-free additives. Was snacking on chocolate but has stopped  Change in appetite?: Yes  Recent Changes in Weight: Weight Loss (15# x 1 year)  Was weight loss intentional or unintentional?: Intentional  Current nutritional status an area of need that is impacting patient's ability to  self-manage diabetes?: No    Additional Social History    Support  Does anyone support you with your diabetes care?: yes  Who supports you?: son/daughter, spouse  Who takes you to your medical appointments?: self  Does the current support meet the patient's needs?: Yes  Is Support an area impacting ability to self-manage diabetes?: No    Access to Mass Media & Technology  Does the patient have access to any of the following devices or technologies?: Smart phone  Media or technology needs impacting ability to self-manage diabetes?: No    Cognitive/Behavioral Health  Alert and Oriented: Yes  Difficulty Thinking: No  Requires Prompting: No  Requires assistance for routine expression?: No  Cognitive or behavioral barriers impacting ability to self-manage diabetes?: No         Communication  Language preference: English  Hearing Problems: No  Vision Problems: No  Communication needs impacting ability to self-manage diabetes?: No    Health Literacy  Preferred Learning Method: Face to Face  How often do you need to have someone help you read instructions, pamphlets, or written material from your doctor or pharmacy?: Never  Health literacy needs impacting ability to self-manage diabetes?: No      Diabetes Self-Management Skills Assessment    Diabetes Disease Process/Treatment Options  Patient/caregiver able to state what happens when someone has diabetes.: somewhat  Patient/caregiver knows what type of diabetes they have.: yes  Diabetes Type : Type II  Patient able to identify at least three risk factors for diabetes.: no  Diabetes Disease Process/Treatment Options: Skills Assessment Completed: Yes  Assessment indicates:: Instruction Needed  Area of need?: Yes    Nutrition/Healthy Eating  Challenges to healthy eating:: snacking between meals and at night (snacking on chocolate but has stopped)  Method of carbohydrate measurement:: eyeballing/guessing  Patient can identify foods that impact blood sugar.:  yes  Patient-identified foods:: starchy vegetables (corn, peas, beans), starches (bread, pasta, rice, cereal), fruit/fruit juice, soda, sweets  Nutrition/Healthy Eating Skills Assessment Completed:: Yes  Assessment indicates:: Instruction Needed  Area of need?: Yes    Physical Activity/Exercise  Patient's daily activity level:: lightly active  Patient formally exercises outside of work.: yes  Exercise Type: other (see comments) (walks dogs once daily)  Intensity: Moderate  Frequency: four or more times a week  Duration: 15 min  Patient can identify forms of physical activity.: yes  Patient can identify reasons why exercise/physical activity is important in diabetes management.: no  Physical Activity/Exercise Skills Assessment Completed: : Yes  Assessment indicates:: Instruction Needed  Area of need?: Yes    Medications  Patient is able to describe current diabetes management routine.: yes  Diabetes management routine:: injectable medications, oral medications  Patient is able to identify current diabetes medications, dosages, and appropriate timing of medications.: yes  Patient understands the purpose of the medications taken for diabetes.: yes  Patient reports problems or concerns with current medication regimen.: no  Medication Skills Assessment Completed:: Yes  Assessment indicates:: Adequate understanding  Area of need?: Yes    Home Blood Glucose Monitoring  Patient states that blood sugar is checked at home daily.: no  Reasons for not monitoring:: device lost or stolen  Home Blood Glucose Monitoring Skills Assessment Completed: : Yes  Assessment indicates:: Instruction Needed  Area of need?: Yes    Acute Complications  Acute Complications Skills Assessment Completed: : No  Deffered due to:: Time  Area of need?: Deferred    Chronic Complications  Patient can identify major chronic complications of diabetes.: no  Patient is taking statin?: Yes  Chronic Complications Skills Assessment Completed: : Yes  Assessment  indicates:: Instruction Needed  Area of need?: Yes    Psychosocial/Coping  Patient can identify ways of coping with chronic disease.: yes  Patient-stated ways of coping with chronic disease:: support from loved ones  Psychosocial/Coping Skills Assessment Completed: : Yes  Assessment indicates:: Adequate understanding  Area of need?: No      Assessment Summary and Plan    Based on today's diabetes care assessment, the following areas of need were identified:          5/14/2024    12:01 AM   Social   Support No   Access to Mass Media/Tech No   Cognitive/Behavioral Health No   Communication No   Health Literacy No            5/14/2024    12:01 AM   Clinical   Medication Adherence No   Lab Compliance No   Nutritional Status No            5/14/2024    12:01 AM   Diabetes Self-Management Skills   Diabetes Disease Process/Treatment Options Yes - Reviewed diabetes pathophysiology, different types of diabetes, signs and symptoms, risk factors and treatment guidelines.    Nutrition/Healthy Eating Yes - see care plan. Pt states he eats small portions due to decreased appetite and for food budget reasons. Eats lean meals. Discussed [employer]. He does not tolerate many vegetables due to Chron's disease.   Physical Activity/Exercise Yes- see care plan. Reports lays in bed most of the day playing video games.    Medication Yes - Discussed MOA, onset, side effects, dosage of meds.   Home Blood Glucose Monitoring Yes - currently not checking blood glucose. States he misplaced meter but will look for it will think about checking several times weekly.   Acute Complications Deferred   Chronic Complications Yes - Discussed importance of A1c less than 7 to reduce risk of micro and macro complications, including nephropathy, neuropathy, retinopathy, heart attack and stroke. Reviewed the importance of controlled Blood Pressure and Cholesterol Lab Values in preventing disease. Will need  Health maintenance reviewed at  follow-up   Psychosocial/Coping No          Today's interventions were provided through individual discussion, instruction, and written materials were provided.      Patient verbalized understanding of instruction and written materials.  Pt was able to return back demonstration of instructions today. Patient understood key points, needs reinforcement and further instruction.     Diabetes Self-Management Care Plan:    Today's Diabetes Self-Management Care Plan was developed with Sheila's input. Sheila has agreed to work toward the following goal(s) to improve his/her overall diabetes control.      Care Plan: Diabetes Management   Updates made since 4/14/2024 12:00 AM        Problem: Healthy Eating         Goal: Avoid beverages with carbohydrates    Start Date: 5/3/2023   Expected End Date: 7/16/2024   This Visit's Progress: No change   Priority: High   Barriers: No Barriers Identified        Problem: Physical Activity and Exercise         Goal: Patient agrees to increase physical activity to a goal of 5 times per week for 30 minutes.    Start Date: 5/14/2024   Expected End Date: 7/31/2024   Priority: Medium   Barriers: No Barriers Identified   Note:    5/14/24 - states will walk dogs for 15 minutes, twice daily for 5 days per week.       Task: Discussed role of physical activity on reducing insulin resistance and improvement in overall glycemic control. Completed 5/14/2024          Follow Up Plan     Follow up in about 3 months (around 8/14/2024) for activity, monitoring, health maintenance.    Today's care plan and follow up schedule was discussed with patient.  Sheila verbalized understanding of the care plan, goals, and agrees to follow up plan.        The patient was encouraged to communicate with his/her health care provider/physician and care team regarding his/her condition(s) and treatment.  I provided the patient with my contact information today and encouraged to contact me via phone or [facility] Patient  Portal as needed.     Length of Visit   Total Time: 60 Minutes

## 2024-06-03 ENCOUNTER — INFUSION (OUTPATIENT)
Dept: INFUSION THERAPY | Facility: HOSPITAL | Age: 49
End: 2024-06-03
Attending: INTERNAL MEDICINE
Payer: MEDICARE

## 2024-06-03 VITALS
DIASTOLIC BLOOD PRESSURE: 74 MMHG | SYSTOLIC BLOOD PRESSURE: 152 MMHG | WEIGHT: 235.88 LBS | BODY MASS INDEX: 33.77 KG/M2 | HEART RATE: 88 BPM | TEMPERATURE: 99 F | HEIGHT: 70 IN

## 2024-06-03 DIAGNOSIS — K50.919 CROHN'S DISEASE WITH COMPLICATION, UNSPECIFIED GASTROINTESTINAL TRACT LOCATION: Primary | ICD-10-CM

## 2024-06-03 PROCEDURE — 96365 THER/PROPH/DIAG IV INF INIT: CPT

## 2024-06-03 PROCEDURE — 63600175 PHARM REV CODE 636 W HCPCS: Mod: JZ,JG | Performed by: INTERNAL MEDICINE

## 2024-06-03 PROCEDURE — 25000003 PHARM REV CODE 250: Performed by: INTERNAL MEDICINE

## 2024-06-03 RX ORDER — HEPARIN 100 UNIT/ML
500 SYRINGE INTRAVENOUS
OUTPATIENT
Start: 2024-06-24

## 2024-06-03 RX ORDER — ACETAMINOPHEN 325 MG/1
650 TABLET ORAL
OUTPATIENT
Start: 2024-06-24

## 2024-06-03 RX ORDER — EPINEPHRINE 1 MG/ML
0.3 INJECTION, SOLUTION, CONCENTRATE INTRAVENOUS
OUTPATIENT
Start: 2024-06-24

## 2024-06-03 RX ORDER — SODIUM CHLORIDE 0.9 % (FLUSH) 0.9 %
10 SYRINGE (ML) INJECTION
Status: DISCONTINUED | OUTPATIENT
Start: 2024-06-03 | End: 2024-06-03 | Stop reason: HOSPADM

## 2024-06-03 RX ORDER — METHYLPREDNISOLONE SOD SUCC 125 MG
40 VIAL (EA) INJECTION
OUTPATIENT
Start: 2024-06-24

## 2024-06-03 RX ORDER — DIPHENHYDRAMINE HYDROCHLORIDE 50 MG/ML
25 INJECTION INTRAMUSCULAR; INTRAVENOUS ONCE AS NEEDED
Status: DISCONTINUED | OUTPATIENT
Start: 2024-06-03 | End: 2024-06-03 | Stop reason: HOSPADM

## 2024-06-03 RX ORDER — SODIUM CHLORIDE 0.9 % (FLUSH) 0.9 %
10 SYRINGE (ML) INJECTION
OUTPATIENT
Start: 2024-06-24

## 2024-06-03 RX ORDER — HEPARIN 100 UNIT/ML
500 SYRINGE INTRAVENOUS
Status: DISCONTINUED | OUTPATIENT
Start: 2024-06-03 | End: 2024-06-03 | Stop reason: HOSPADM

## 2024-06-03 RX ORDER — ACETAMINOPHEN 325 MG/1
650 TABLET ORAL
Status: ACTIVE | OUTPATIENT
Start: 2024-06-03 | End: 2024-06-03

## 2024-06-03 RX ORDER — DIPHENHYDRAMINE HYDROCHLORIDE 50 MG/ML
25 INJECTION INTRAMUSCULAR; INTRAVENOUS ONCE AS NEEDED
OUTPATIENT
Start: 2024-06-24

## 2024-06-03 RX ORDER — EPINEPHRINE 1 MG/ML
0.3 INJECTION, SOLUTION, CONCENTRATE INTRAVENOUS
Status: DISPENSED | OUTPATIENT
Start: 2024-06-03 | End: 2024-06-03

## 2024-06-03 RX ADMIN — VEDOLIZUMAB 300 MG: 300 INJECTION, POWDER, LYOPHILIZED, FOR SOLUTION INTRAVENOUS at 08:06

## 2024-07-01 ENCOUNTER — INFUSION (OUTPATIENT)
Dept: INFUSION THERAPY | Facility: HOSPITAL | Age: 49
End: 2024-07-01
Attending: INTERNAL MEDICINE
Payer: MEDICARE

## 2024-07-01 ENCOUNTER — TELEPHONE (OUTPATIENT)
Dept: FAMILY MEDICINE | Facility: CLINIC | Age: 49
End: 2024-07-01
Payer: MEDICARE

## 2024-07-01 VITALS
DIASTOLIC BLOOD PRESSURE: 80 MMHG | OXYGEN SATURATION: 96 % | BODY MASS INDEX: 31.9 KG/M2 | WEIGHT: 222.81 LBS | HEART RATE: 96 BPM | RESPIRATION RATE: 16 BRPM | TEMPERATURE: 99 F | SYSTOLIC BLOOD PRESSURE: 133 MMHG | HEIGHT: 70 IN

## 2024-07-01 DIAGNOSIS — K50.919 CROHN'S DISEASE WITH COMPLICATION, UNSPECIFIED GASTROINTESTINAL TRACT LOCATION: Primary | ICD-10-CM

## 2024-07-01 PROCEDURE — 96365 THER/PROPH/DIAG IV INF INIT: CPT

## 2024-07-01 PROCEDURE — 63600175 PHARM REV CODE 636 W HCPCS: Mod: JZ,JG | Performed by: INTERNAL MEDICINE

## 2024-07-01 PROCEDURE — 25000003 PHARM REV CODE 250: Performed by: INTERNAL MEDICINE

## 2024-07-01 RX ORDER — HEPARIN 100 UNIT/ML
500 SYRINGE INTRAVENOUS
OUTPATIENT
Start: 2024-07-29

## 2024-07-01 RX ORDER — ACETAMINOPHEN 325 MG/1
650 TABLET ORAL
OUTPATIENT
Start: 2024-07-29

## 2024-07-01 RX ORDER — METHYLPREDNISOLONE SOD SUCC 125 MG
40 VIAL (EA) INJECTION
OUTPATIENT
Start: 2024-07-29

## 2024-07-01 RX ORDER — SODIUM CHLORIDE 0.9 % (FLUSH) 0.9 %
10 SYRINGE (ML) INJECTION
Status: DISCONTINUED | OUTPATIENT
Start: 2024-07-01 | End: 2024-07-01 | Stop reason: HOSPADM

## 2024-07-01 RX ORDER — ACETAMINOPHEN 325 MG/1
650 TABLET ORAL
Status: ACTIVE | OUTPATIENT
Start: 2024-07-01 | End: 2024-07-01

## 2024-07-01 RX ORDER — DIPHENHYDRAMINE HYDROCHLORIDE 50 MG/ML
25 INJECTION INTRAMUSCULAR; INTRAVENOUS ONCE AS NEEDED
OUTPATIENT
Start: 2024-07-29

## 2024-07-01 RX ORDER — HEPARIN 100 UNIT/ML
500 SYRINGE INTRAVENOUS
Status: DISCONTINUED | OUTPATIENT
Start: 2024-07-01 | End: 2024-07-01 | Stop reason: HOSPADM

## 2024-07-01 RX ORDER — EPINEPHRINE 1 MG/ML
0.3 INJECTION, SOLUTION, CONCENTRATE INTRAVENOUS
OUTPATIENT
Start: 2024-07-29

## 2024-07-01 RX ORDER — SODIUM CHLORIDE 0.9 % (FLUSH) 0.9 %
10 SYRINGE (ML) INJECTION
OUTPATIENT
Start: 2024-07-29

## 2024-07-01 RX ORDER — EPINEPHRINE 1 MG/ML
0.3 INJECTION, SOLUTION, CONCENTRATE INTRAVENOUS
Status: DISPENSED | OUTPATIENT
Start: 2024-07-01 | End: 2024-07-01

## 2024-07-01 RX ORDER — DIPHENHYDRAMINE HYDROCHLORIDE 50 MG/ML
25 INJECTION INTRAMUSCULAR; INTRAVENOUS ONCE AS NEEDED
Status: DISCONTINUED | OUTPATIENT
Start: 2024-07-01 | End: 2024-07-01 | Stop reason: HOSPADM

## 2024-07-01 RX ADMIN — SODIUM CHLORIDE: 9 INJECTION, SOLUTION INTRAVENOUS at 12:07

## 2024-07-01 RX ADMIN — VEDOLIZUMAB 300 MG: 300 INJECTION, POWDER, LYOPHILIZED, FOR SOLUTION INTRAVENOUS at 12:07

## 2024-07-01 NOTE — TELEPHONE ENCOUNTER
Called patient.  confirmed. Patient states that he is going into surgery on 2024 exploratory surgery and he was instructed to get off of his ozempic for at least a week for the anesthesia to work. Patient is wanting to know if getting off of his ozempic is okay and will he need to get on something else for that week.

## 2024-07-01 NOTE — TELEPHONE ENCOUNTER
----- Message from Madisyn Mathias sent at 7/1/2024 10:54 AM CDT -----  Regarding: med advice  Who called:  Patient's wife     Callback number:   [phone]    Description:   Requesting a Callback and Requesting Med Advice    Medicine? Ozempac     Symptoms/Side Effects?: pt is having surgery and was told to stop taking medicine and wants to make sure with his dr that it is okay for him to stop      Referral prov./specialty/fax or number (If applicable)?      Callback/message needed?  call back    Callback number:     Jesse, Cecilio, or [facility]?  phone call    LOV/NOV?:      Additional info:

## 2024-07-01 NOTE — TELEPHONE ENCOUNTER
Patient can stop his Ozempic.  Please tell patient no additional medication is needed he will need to more carefully monitor his blood sugar and dietary habits.  If he needs to go off of Ozempic for more than a week please have patient make appointment so that we can discuss alternatives.

## 2024-09-03 ENCOUNTER — TELEPHONE (OUTPATIENT)
Dept: INFUSION THERAPY | Facility: HOSPITAL | Age: 49
End: 2024-09-03
Payer: MEDICARE

## 2024-11-08 ENCOUNTER — TELEPHONE (OUTPATIENT)
Dept: FAMILY MEDICINE | Facility: CLINIC | Age: 49
End: 2024-11-08
Payer: MEDICARE

## 2024-11-08 NOTE — TELEPHONE ENCOUNTER
----- Message from [employer] sent at 11/8/2024 12:16 PM CST -----  .Who Called: Anu with [employer]    Caller is requesting assistance/information from provider's office.    Symptoms (please be specific): n/a   How long has patient had these symptoms:  n/a  List of preferred pharmacies on file (remove unneeded): [unfilled]  If different, enter pharmacy into here including location and phone number: n/a      Preferred Method of Contact: Phone Call  Patient's Preferred Phone Number on File:   Best Call Back Number, if different:[phone]  Additional Information:  Caller has been admitted to [facility] and caller is calling to notify. Please call to advise.

## 2024-11-08 NOTE — TELEPHONE ENCOUNTER
The only thing showing is that he was in [facility] in July for an aspiration event.  We can monitor if something comes through over the weekend. Nothing else to do now.

## 2024-12-26 ENCOUNTER — TELEPHONE (OUTPATIENT)
Dept: FAMILY MEDICINE | Facility: CLINIC | Age: 49
End: 2024-12-26
Payer: MEDICARE

## 2024-12-26 NOTE — TELEPHONE ENCOUNTER
Called and spoke with patient, returning phone call. Verified . ED/Urgent Care precautions given to patient for current symptoms. Also encouraged patient to contact GI provider, Dr. Trent. Verbalized understanding.

## 2024-12-26 NOTE — TELEPHONE ENCOUNTER
----- Message from Octavia sent at 12/24/2024  1:38 PM CST -----  Type: General Call Back     Name of Caller:pt   Reason pt talked to a nurse, pt states that he is feeling nauseous and states that he would like something for the feeling   Would the patient rather a call back or a response via [facility]? Call   Best Call Back Number:[phone]   Additional Information:

## 2025-01-03 ENCOUNTER — LAB VISIT (OUTPATIENT)
Dept: LAB | Facility: HOSPITAL | Age: 50
End: 2025-01-03
Attending: STUDENT IN AN ORGANIZED HEALTH CARE EDUCATION/TRAINING PROGRAM
Payer: MEDICARE

## 2025-01-03 ENCOUNTER — OFFICE VISIT (OUTPATIENT)
Dept: FAMILY MEDICINE | Facility: CLINIC | Age: 50
End: 2025-01-03
Payer: MEDICARE

## 2025-01-03 VITALS
TEMPERATURE: 99 F | BODY MASS INDEX: 25.48 KG/M2 | SYSTOLIC BLOOD PRESSURE: 100 MMHG | HEART RATE: 108 BPM | WEIGHT: 178 LBS | HEIGHT: 70 IN | OXYGEN SATURATION: 99 % | DIASTOLIC BLOOD PRESSURE: 71 MMHG

## 2025-01-03 DIAGNOSIS — I10 PRIMARY HYPERTENSION: ICD-10-CM

## 2025-01-03 DIAGNOSIS — E03.4 HYPOTHYROIDISM DUE TO ACQUIRED ATROPHY OF THYROID: ICD-10-CM

## 2025-01-03 DIAGNOSIS — R82.998 DARK URINE: ICD-10-CM

## 2025-01-03 DIAGNOSIS — B37.9 YEAST INFECTION: ICD-10-CM

## 2025-01-03 DIAGNOSIS — B37.2 SKIN YEAST INFECTION: ICD-10-CM

## 2025-01-03 DIAGNOSIS — Z93.2 HIGH OUTPUT ILEOSTOMY: ICD-10-CM

## 2025-01-03 DIAGNOSIS — E11.9 TYPE 2 DIABETES MELLITUS WITHOUT COMPLICATION, WITHOUT LONG-TERM CURRENT USE OF INSULIN: ICD-10-CM

## 2025-01-03 DIAGNOSIS — E78.5 HYPERLIPIDEMIA, UNSPECIFIED HYPERLIPIDEMIA TYPE: ICD-10-CM

## 2025-01-03 DIAGNOSIS — E11.9 TYPE 2 DIABETES MELLITUS WITHOUT COMPLICATION, WITH LONG-TERM CURRENT USE OF INSULIN: ICD-10-CM

## 2025-01-03 DIAGNOSIS — E11.9 TYPE 2 DIABETES MELLITUS WITHOUT COMPLICATIONS: ICD-10-CM

## 2025-01-03 DIAGNOSIS — Z79.4 TYPE 2 DIABETES MELLITUS WITHOUT COMPLICATION, WITH LONG-TERM CURRENT USE OF INSULIN: ICD-10-CM

## 2025-01-03 DIAGNOSIS — K21.9 GASTROESOPHAGEAL REFLUX DISEASE WITHOUT ESOPHAGITIS: ICD-10-CM

## 2025-01-03 DIAGNOSIS — R19.8 HIGH OUTPUT ILEOSTOMY: ICD-10-CM

## 2025-01-03 DIAGNOSIS — K21.9 GASTROESOPHAGEAL REFLUX DISEASE, UNSPECIFIED WHETHER ESOPHAGITIS PRESENT: ICD-10-CM

## 2025-01-03 DIAGNOSIS — E11.9 TYPE 2 DIABETES MELLITUS WITHOUT COMPLICATION, WITHOUT LONG-TERM CURRENT USE OF INSULIN: Primary | ICD-10-CM

## 2025-01-03 LAB
ANION GAP SERPL CALC-SCNC: 9 MEQ/L
BUN SERPL-MCNC: 17.4 MG/DL (ref 8.9–20.6)
CALCIUM SERPL-MCNC: 9.6 MG/DL (ref 8.4–10.2)
CHLORIDE SERPL-SCNC: 108 MMOL/L (ref 98–107)
CO2 SERPL-SCNC: 16 MMOL/L (ref 22–29)
CREAT SERPL-MCNC: 1.23 MG/DL (ref 0.72–1.25)
CREAT UR-MCNC: 264.9 MG/DL (ref 63–166)
CREAT/UREA NIT SERPL: 14
EST. AVERAGE GLUCOSE BLD GHB EST-MCNC: 96.8 MG/DL
GFR SERPLBLD CREATININE-BSD FMLA CKD-EPI: >60 ML/MIN/1.73/M2
GLUCOSE SERPL-MCNC: 141 MG/DL (ref 74–100)
HBA1C MFR BLD: 5 %
MICROALBUMIN UR-MCNC: 27.8 UG/ML
MICROALBUMIN/CREAT RATIO PNL UR: 10.5 MG/GM CR (ref 0–30)
POTASSIUM SERPL-SCNC: 4.5 MMOL/L (ref 3.5–5.1)
SODIUM SERPL-SCNC: 133 MMOL/L (ref 136–145)

## 2025-01-03 PROCEDURE — 36415 COLL VENOUS BLD VENIPUNCTURE: CPT

## 2025-01-03 PROCEDURE — 83036 HEMOGLOBIN GLYCOSYLATED A1C: CPT

## 2025-01-03 PROCEDURE — 80048 BASIC METABOLIC PNL TOTAL CA: CPT

## 2025-01-03 PROCEDURE — 82570 ASSAY OF URINE CREATININE: CPT | Performed by: STUDENT IN AN ORGANIZED HEALTH CARE EDUCATION/TRAINING PROGRAM

## 2025-01-03 PROCEDURE — 99214 OFFICE O/P EST MOD 30 MIN: CPT | Mod: PBBFAC,PN | Performed by: STUDENT IN AN ORGANIZED HEALTH CARE EDUCATION/TRAINING PROGRAM

## 2025-01-03 RX ORDER — ROSUVASTATIN CALCIUM 10 MG/1
10 TABLET, COATED ORAL NIGHTLY
Qty: 90 TABLET | Refills: 3 | Status: SHIPPED | OUTPATIENT
Start: 2025-01-03

## 2025-01-03 RX ORDER — LOSARTAN POTASSIUM 25 MG/1
25 TABLET ORAL DAILY
Qty: 90 TABLET | Refills: 3 | Status: SHIPPED | OUTPATIENT
Start: 2025-01-03 | End: 2026-01-03

## 2025-01-03 RX ORDER — LEVOTHYROXINE SODIUM 125 UG/1
125 TABLET ORAL
Qty: 30 TABLET | Refills: 11 | Status: SHIPPED | OUTPATIENT
Start: 2025-01-03 | End: 2026-01-03

## 2025-01-03 RX ORDER — PANTOPRAZOLE SODIUM 40 MG/1
40 TABLET, DELAYED RELEASE ORAL 2 TIMES DAILY
Qty: 180 TABLET | Refills: 3 | Status: SHIPPED | OUTPATIENT
Start: 2025-01-03

## 2025-01-03 RX ORDER — FLUCONAZOLE 150 MG/1
150 TABLET ORAL DAILY
Qty: 2 TABLET | Refills: 1 | Status: SHIPPED | OUTPATIENT
Start: 2025-01-03

## 2025-01-03 RX ORDER — SEMAGLUTIDE 2.68 MG/ML
2 INJECTION, SOLUTION SUBCUTANEOUS
Qty: 3 ML | Refills: 11 | Status: SHIPPED | OUTPATIENT
Start: 2025-01-03 | End: 2026-01-03

## 2025-01-03 NOTE — ASSESSMENT & PLAN NOTE
A1c and microalbumin.  Patient with concern for kidney issues and states urine is darker.  Would like BMP.  Reviewed reassuring BMP but did order urine cx and BMP for patient.

## 2025-01-03 NOTE — PROGRESS NOTES
Patient Name: Sheila Hamilton     : 1975    MRN: [id number]     Subjective:     Patient ID: Sheila Hamilton is a 49 y.o. male.    Chief Complaint:   Chief Complaint   Patient presents with    Follow-up     Patient here for hospital follow up. Was in the hospital for 5 months due to intestine shutting down.discharged on 2024. Patient will need all meds sent to his new pharmacy. Requesting protonix. Also requesting diflucan for yeast infection. Bp 100/71        HPI: HPI  49 year-old male presents to clinic for hospital follow-up.    Patient was in the hospital/[facility] for nearly 5 months secondary to complications from Crohn's disease necessitating creation of new ileostomy, resection of bowel.  Reports that he does have follow-up with his colorectal surgeon.       Reports ;yeast infection in groin. Just completed antibiotics yesterday.  Itchy red groin    Crohn's disease  sees Dr. Trent has infusion every month at [facility] has an ileostomy  Protonix 40  mg daily; asking to increase for 40 mg BID    Elevated Liver Enzymes  Noted previously. Also states his cardiologist asked him to discuss this with PCP  Hep panel negative 2022  Patient reports going to his gastrointestinal doctor who asked him to lose weight to help his elevated liver enzymes secondary to fatty liver disease      Hypothyroidism  takes levothyroxine 137 mcg   TSH and T4 WNL    diabetes  A1c 7.3  A1c 24 7.5   Janumet  mg twice daily  Ozempic1 mg qweek  Needs A1c today.    Anxiety  Sees a psychiatrist who has prescribed diazepam 10 mg 3 times daily  Bupropion 150 mg twice daily  Trileptal 300 mg twice daily  Trazodone 50 mg at bedtime  Bsupar 5 mg   Abilify 10 mg       Reactive airway disease  Uses albuterol as needed  Also uses Flonase twice daily for allergies    Hypertension  Losartan 25 mg  /71 History of stent placement in legs  sees [facility]    HLD  Rosuvastatin 10 at bedtime    Low  testosterone  testosterone 200 mg injections Patient reports that he takes every 14 days week.  Long discussion as patient receiving these injections from provider in [place]. Does not manage just gives injections.      ROS:      ROS     12 point review of systems conducted, negative except as stated in the history of present illness. See HPI for details.    History:     Past Medical History:   Diagnosis Date    Anxiety     Crohn's colitis     Depression     Diabetes mellitus, type 2     GERD (gastroesophageal reflux disease)     Hypothyroidism         Past Surgical History:   Procedure Laterality Date    BOWEL RESECTION      CHOLECYSTECTOMY      EGD, WITH CLOSED BIOPSY N/A 05/07/2024    Procedure: EGD with biopsy;  Surgeon: Lawrence Trent MD;  Location: [facility] ENDOSCOPY;  Service: Gastroenterology;  Laterality: N/A;    HERNIA REPAIR      ILEOSCOPY THROUGH STOMA N/A 05/07/2024    Procedure: ileoscopy;  Surgeon: Lawrence Trent MD;  Location: [facility] ENDOSCOPY;  Service: Gastroenterology;  Laterality: N/A;    ILEOSTOMY Left     Ostomy Removal  07/16/2024       Family History   Problem Relation Name Age of Onset    Cancer Mother          Social History     Tobacco Use    Smoking status: Never     Passive exposure: Never    Smokeless tobacco: Never   Substance and Sexual Activity    Alcohol use: Not Currently    Drug use: Never    Sexual activity: Yes     Partners: Female       Current Outpatient Medications   Medication Instructions    albuterol (PROVENTIL/VENTOLIN HFA) 90 mcg/actuation inhaler No dose, route, or frequency recorded.    ARIPiprazole (ABILIFY) 10 mg, Daily    azelastine (ASTELIN) 137 mcg (0.1 %) nasal spray No dose, route, or frequency recorded.    blood sugar diagnostic Strp 100 each, Misc.(Non-Drug; Combo Route), 3 times daily    blood-glucose meter kit Use as instructed    buPROPion (WELLBUTRIN SR) 150 mg, 2 times daily    busPIRone (BUSPAR) 7.5 mg, 3 times daily    cetirizine  "(ZYRTEC) 10 mg    clopidogreL (PLAVIX) 75 mg    dicyclomine (BENTYL) 20 mg, 3 times daily    EScitalopram oxalate (LEXAPRO) 10 mg    fluconazole (DIFLUCAN) 150 mg, Oral, Daily, Repeat dosing after 72 hours if no relief in symptoms.    furosemide (LASIX) 40 mg, Daily    HYDROcodone-acetaminophen (NORCO) 5-325 mg per tablet 1 tablet, Every 4 hours PRN    levothyroxine (SYNTHROID) 125 mcg, Oral, Before breakfast    losartan (COZAAR) 25 mg, Oral, Daily    ondansetron (ZOFRAN-ODT) 4 mg, Every 8 hours PRN    OXcarbazepine (TRILEPTAL) 300 mg, 2 times daily    OZEMPIC 2 mg, Subcutaneous, Every 7 days    pantoprazole (PROTONIX) 40 mg, Oral, 2 times daily    promethazine (PHENERGAN) 25 mg, Every 6 hours PRN    rosuvastatin (CRESTOR) 10 mg, Oral, Nightly    SITagliptan-metformin (JANUMET) 50-1,000 mg per tablet 1 tablet, Oral, 2 times daily with meals    testosterone cypionate (DEPOTESTOTERONE CYPIONATE) 100 mg, Intramuscular, Every 14 days    traMADoL (ULTRAM) 50 mg tablet 1 tablet as needed Orally every 6 hours for 3 days    traZODone (DESYREL) 100 mg        Review of patient's allergies indicates:   Allergen Reactions    Bee venom protein (honey bee)     Insect venom        Objective:     Visit Vitals  /71 (BP Location: Left arm, Patient Position: Sitting)   Pulse 108   Temp 98.8 °F (37.1 °C) (Oral)   Ht 5' 10" (1.778 m)   Wt 80.7 kg (178 lb)   SpO2 99%   BMI 25.54 kg/m²       Physical Examination:     Physical Exam  Constitutional:       General: He is not in acute distress.     Appearance: Normal appearance. He is not ill-appearing or diaphoretic.   HENT:      Nose: No congestion.   Eyes:      General: No scleral icterus.     Conjunctiva/sclera: Conjunctivae normal.   Cardiovascular:      Rate and Rhythm: Normal rate and regular rhythm.      Heart sounds: No murmur heard.  Pulmonary:      Effort: Pulmonary effort is normal. No respiratory distress.      Breath sounds: Normal breath sounds. No wheezing.   Abdominal: "      General: Bowel sounds are normal.      Comments: Ileostomy left quadrant   Musculoskeletal:         General: No swelling, tenderness, deformity or signs of injury.      Cervical back: Normal range of motion.      Comments: Ambulating with cane    Skin:     General: Skin is warm and dry.      Coloration: Skin is pale. Skin is not jaundiced.      Findings: Rash present.   Neurological:      Mental Status: He is alert and oriented to person, place, and time. Mental status is at baseline.      Gait: Gait normal.   Psychiatric:         Thought Content: Thought content normal.         Lab Results:     Chemistry:  Lab Results   Component Value Date     12/23/2024    K 4.1 12/23/2024    BUN 14 12/23/2024    CREATININE 0.7 (L) 12/23/2024    EGFRNORACEVR >60 01/30/2024    GLUCOSE 161 (H) 01/30/2024    CALCIUM 8.7 (L) 12/23/2024    ALKPHOS 70 01/30/2024    LABPROT 7.7 01/30/2024    ALBUMIN 4.1 01/30/2024    BILIDIR 0.4 04/22/2022    IBILI 0.60 04/22/2022    AST 27 01/30/2024    ALT 36 01/30/2024    TSH 3.151 10/14/2024    SANRTW4WQCL 1.04 04/30/2024    PSA 1.81 09/28/2023        Lab Results   Component Value Date    HGBA1C 6.3 07/16/2024        Hematology:  Lab Results   Component Value Date    WBC 7.57 01/30/2024    HGB 6.4 (L) 11/30/2024    HCT 20.9 (L) 11/30/2024     01/30/2024       Lipid Panel:  Lab Results   Component Value Date    CHOL 85 01/30/2024    HDL 27 (L) 01/30/2024    LDL 30.00 (L) 01/30/2024    TRIG 142 (H) 01/30/2024    TOTALCHOLEST 3 01/30/2024        Urine:  Lab Results   Component Value Date    APPEARANCEUA Turbid (A) 09/28/2023    SGUA 1.021 09/28/2023    PROTEINUA Trace (A) 09/28/2023    KETONESUA Trace (A) 09/28/2023    LEUKOCYTESUR 500 (A) 09/28/2023    RBCUA 0-5 09/28/2023    WBCUA >100 (A) 09/28/2023    BACTERIA Trace (A) 09/28/2023    SQEPUA Trace (A) 09/28/2023    HYALINECASTS None Seen 09/28/2023    CREATRANDUR 183.8 (H) 06/27/2023        Assessment:          ICD-10-CM ICD-9-CM    1. Type 2 diabetes mellitus without complication, without long-term current use of insulin  E11.9 250.00   2. Dark urine  R82.998 791.9   3. Primary hypertension  I10 401.9   4. Yeast infection  B37.9 112.9   5. Gastroesophageal reflux disease, unspecified whether esophagitis present  K21.9 530.81   6. High output ileostomy  R19.8 787.99    Z93.2 V44.2   7. Hypothyroidism due to acquired atrophy of thyroid  E03.4 244.8     246.8   8. Type 2 diabetes mellitus without complications  E11.9 250.00   9. Type 2 diabetes mellitus without complication, with long-term current use of insulin  E11.9 250.00    Z79.4 V58.67   10. Hyperlipidemia, unspecified hyperlipidemia type  E78.5 272.4   11. Skin yeast infection  B37.2 112.3   12. Gastroesophageal reflux disease without esophagitis  K21.9 530.81        Plan:     1. Type 2 diabetes mellitus without complication, without long-term current use of insulin  Assessment & Plan:  A1c and microalbumin.  Patient with concern for kidney issues and states urine is darker.  Would like BMP.  Reviewed reassuring BMP but did order urine cx and BMP for patient.    Orders:  -     Microalbumin/Creatinine Ratio, Urine  -     Cancel: POCT HEMOGLOBIN A1C  -     Hemoglobin A1C; Future; Expected date: 01/03/2025  -     semaglutide (OZEMPIC) 2 mg/dose (8 mg/3 mL) PnIj; Inject 2 mg into the skin every 7 days.  Dispense: 3 mL; Refill: 11    2. Dark urine  -     Urinalysis, Reflex to Urine Culture    3. Primary hypertension  Assessment & Plan:  Continuing losartan    Orders:  -     Basic Metabolic Panel; Future; Expected date: 01/03/2025    4. Yeast infection  -     fluconazole (DIFLUCAN) 150 MG Tab; Take 1 tablet (150 mg total) by mouth once daily. Repeat dosing after 72 hours if no relief in symptoms.  Dispense: 2 tablet; Refill: 1    5. Gastroesophageal reflux disease, unspecified whether esophagitis present  -     pantoprazole (PROTONIX) 40 MG tablet; Take 1 tablet (40 mg total) by mouth 2 (two)  times daily.  Dispense: 180 tablet; Refill: 3    6. High output ileostomy  -     pantoprazole (PROTONIX) 40 MG tablet; Take 1 tablet (40 mg total) by mouth 2 (two) times daily.  Dispense: 180 tablet; Refill: 3    7. Hypothyroidism due to acquired atrophy of thyroid  Assessment & Plan:   TSH check 2 months prior. Continue current dose of levothyroxine    Orders:  -     levothyroxine (SYNTHROID) 125 MCG tablet; Take 1 tablet (125 mcg total) by mouth before breakfast.  Dispense: 30 tablet; Refill: 11    8. Type 2 diabetes mellitus without complications  -     losartan (COZAAR) 25 MG tablet; Take 1 tablet (25 mg total) by mouth once daily.  Dispense: 90 tablet; Refill: 3    9. Type 2 diabetes mellitus without complication, with long-term current use of insulin  -     SITagliptan-metformin (JANUMET) 50-1,000 mg per tablet; Take 1 tablet by mouth 2 (two) times daily with meals.  Dispense: 180 tablet; Refill: 3    10. Hyperlipidemia, unspecified hyperlipidemia type  -     rosuvastatin (CRESTOR) 10 MG tablet; Take 1 tablet (10 mg total) by mouth every evening.  Dispense: 90 tablet; Refill: 3    11. Skin yeast infection  Assessment & Plan:  Prescribed diflucan 150 mg today with repeat in 3 days      12. Gastroesophageal reflux disease without esophagitis  Assessment & Plan:  Increasing Protonix to 40 mg BID           Follow up in about 3 months (around 4/3/2025) for Diabetes.    Future Appointments   Date Time Provider Department Center   3/24/2025  9:00 AM Stacy Chahal NP [facility]        Alena Mcqueen MD

## 2025-01-06 ENCOUNTER — TELEPHONE (OUTPATIENT)
Dept: FAMILY MEDICINE | Facility: CLINIC | Age: 50
End: 2025-01-06
Payer: MEDICARE

## 2025-01-06 ENCOUNTER — PATIENT MESSAGE (OUTPATIENT)
Dept: FAMILY MEDICINE | Facility: CLINIC | Age: 50
End: 2025-01-06
Payer: MEDICARE

## 2025-01-06 DIAGNOSIS — E86.0 DEHYDRATION: Primary | ICD-10-CM

## 2025-01-06 NOTE — TELEPHONE ENCOUNTER
Called patient.  confirmed. I let the patient know that Dr. Mcqueen did try to send the patient a message through the portal but it does not seem like he has read it.     I informed the patient that his sodium and chloride was low and it could be due to him being dehydrated. He was informed that Dr. Mcqueen did put orders in to get this done again. Patient states that he did have some labs done today at [facility] and he is currently not feeling well so he is going to check himself in the ER.     I informed the patient that he could let them know that his sodium and chloride was low and they could check this for him. Patient understood verbal orders.

## 2025-01-08 ENCOUNTER — TELEPHONE (OUTPATIENT)
Dept: FAMILY MEDICINE | Facility: CLINIC | Age: 50
End: 2025-01-08
Payer: MEDICARE

## 2025-01-08 DIAGNOSIS — Z93.2 HIGH OUTPUT ILEOSTOMY: Primary | ICD-10-CM

## 2025-01-08 DIAGNOSIS — R19.8 HIGH OUTPUT ILEOSTOMY: Primary | ICD-10-CM

## 2025-01-08 NOTE — TELEPHONE ENCOUNTER
Agustin Arguelles isn't listed in wound care for us so I did print out a referral that says [facility] treat. That can be faxed to [facility].

## 2025-01-08 NOTE — TELEPHONE ENCOUNTER
Returned Liat call. She states that around his ostomy bad it is really irritated. She is requesting the patient getting a referral for [facility] for ostomy management.     Requested if I get approval for this from provider to fax referral to [facility]

## 2025-01-08 NOTE — TELEPHONE ENCOUNTER
----- Message from [employer] sent at 1/7/2025  2:10 PM CST -----  Who Called: Sheila Hamilton    Caller is requesting assistance/information from provider's office.    Symptoms (please be specific): n/a   How long has patient had these symptoms:  n/a  List of preferred pharmacies on file (remove unneeded): [unfilled]  If different, enter pharmacy into here including location and phone number: n/a      Preferred Method of Contact: Phone Call  Patient's Preferred Phone Number on File: [phone]   Best Call Back Number, if different:  Additional Information: pt advice, [facility] [phone] or fax# [phone], called to discuss pt current medical status  of ileostomy bag and irration, please advise, thanks

## 2025-04-09 ENCOUNTER — OFFICE VISIT (OUTPATIENT)
Dept: FAMILY MEDICINE | Facility: CLINIC | Age: 50
End: 2025-04-09
Payer: MEDICARE

## 2025-04-09 ENCOUNTER — RESULTS FOLLOW-UP (OUTPATIENT)
Dept: FAMILY MEDICINE | Facility: CLINIC | Age: 50
End: 2025-04-09

## 2025-04-09 VITALS
BODY MASS INDEX: 28.92 KG/M2 | HEIGHT: 70 IN | DIASTOLIC BLOOD PRESSURE: 79 MMHG | HEART RATE: 68 BPM | WEIGHT: 202 LBS | TEMPERATURE: 99 F | SYSTOLIC BLOOD PRESSURE: 117 MMHG | OXYGEN SATURATION: 100 %

## 2025-04-09 DIAGNOSIS — E03.4 HYPOTHYROIDISM DUE TO ACQUIRED ATROPHY OF THYROID: ICD-10-CM

## 2025-04-09 DIAGNOSIS — F20.9 SCHIZOPHRENIA, UNSPECIFIED TYPE: ICD-10-CM

## 2025-04-09 DIAGNOSIS — E11.9 TYPE 2 DIABETES MELLITUS WITHOUT COMPLICATION, WITHOUT LONG-TERM CURRENT USE OF INSULIN: ICD-10-CM

## 2025-04-09 DIAGNOSIS — R30.0 DYSURIA: ICD-10-CM

## 2025-04-09 DIAGNOSIS — I10 PRIMARY HYPERTENSION: Primary | ICD-10-CM

## 2025-04-09 DIAGNOSIS — K21.9 GASTROESOPHAGEAL REFLUX DISEASE, UNSPECIFIED WHETHER ESOPHAGITIS PRESENT: ICD-10-CM

## 2025-04-09 DIAGNOSIS — Z93.2 HIGH OUTPUT ILEOSTOMY: ICD-10-CM

## 2025-04-09 DIAGNOSIS — E78.5 HYPERLIPIDEMIA, UNSPECIFIED HYPERLIPIDEMIA TYPE: ICD-10-CM

## 2025-04-09 DIAGNOSIS — R19.8 HIGH OUTPUT ILEOSTOMY: ICD-10-CM

## 2025-04-09 LAB
ALBUMIN SERPL-MCNC: 4 G/DL (ref 3.5–5)
ALBUMIN/GLOB SERPL: 1 RATIO (ref 1.1–2)
ALP SERPL-CCNC: 88 UNIT/L (ref 40–150)
ALT SERPL-CCNC: 40 UNIT/L (ref 0–55)
ANION GAP SERPL CALC-SCNC: 5 MEQ/L
AST SERPL-CCNC: 43 UNIT/L (ref 11–45)
BACTERIA #/AREA URNS AUTO: ABNORMAL /HPF
BASOPHILS # BLD AUTO: 0.08 X10(3)/MCL
BASOPHILS NFR BLD AUTO: 0.6 %
BILIRUB SERPL-MCNC: 0.4 MG/DL
BILIRUB UR QL STRIP.AUTO: NEGATIVE
BUN SERPL-MCNC: 16.8 MG/DL (ref 8.9–20.6)
CALCIUM SERPL-MCNC: 9.2 MG/DL (ref 8.4–10.2)
CHLORIDE SERPL-SCNC: 110 MMOL/L (ref 98–107)
CHOLEST SERPL-MCNC: 102 MG/DL
CHOLEST/HDLC SERPL: 4 {RATIO} (ref 0–5)
CLARITY UR: ABNORMAL
CO2 SERPL-SCNC: 23 MMOL/L (ref 22–29)
COLOR UR AUTO: YELLOW
CREAT SERPL-MCNC: 1.16 MG/DL (ref 0.72–1.25)
CREAT/UREA NIT SERPL: 14
EOSINOPHIL # BLD AUTO: 0.61 X10(3)/MCL (ref 0–0.9)
EOSINOPHIL NFR BLD AUTO: 4.9 %
ERYTHROCYTE [DISTWIDTH] IN BLOOD BY AUTOMATED COUNT: 14.3 % (ref 11.5–17)
EST. AVERAGE GLUCOSE BLD GHB EST-MCNC: 116.9 MG/DL
GFR SERPLBLD CREATININE-BSD FMLA CKD-EPI: >60 ML/MIN/1.73/M2
GLOBULIN SER-MCNC: 4.1 GM/DL (ref 2.4–3.5)
GLUCOSE SERPL-MCNC: 132 MG/DL (ref 74–100)
GLUCOSE UR QL STRIP: NORMAL
HBA1C MFR BLD: 5.7 %
HCT VFR BLD AUTO: 37.5 % (ref 42–52)
HDLC SERPL-MCNC: 29 MG/DL (ref 35–60)
HGB BLD-MCNC: 12.7 G/DL (ref 14–18)
HGB UR QL STRIP: ABNORMAL
IMM GRANULOCYTES # BLD AUTO: 0.2 X10(3)/MCL (ref 0–0.04)
IMM GRANULOCYTES NFR BLD AUTO: 1.6 %
KETONES UR QL STRIP: NEGATIVE
LDLC SERPL CALC-MCNC: 30 MG/DL (ref 50–140)
LEUKOCYTE ESTERASE UR QL STRIP: 500
LYMPHOCYTES # BLD AUTO: 2.8 X10(3)/MCL (ref 0.6–4.6)
LYMPHOCYTES NFR BLD AUTO: 22.6 %
MCH RBC QN AUTO: 28.9 PG (ref 27–31)
MCHC RBC AUTO-ENTMCNC: 33.9 G/DL (ref 33–36)
MCV RBC AUTO: 85.2 FL (ref 80–94)
MONOCYTES # BLD AUTO: 0.76 X10(3)/MCL (ref 0.1–1.3)
MONOCYTES NFR BLD AUTO: 6.1 %
MUCOUS THREADS URNS QL MICRO: ABNORMAL /LPF
NEUTROPHILS # BLD AUTO: 7.96 X10(3)/MCL (ref 2.1–9.2)
NEUTROPHILS NFR BLD AUTO: 64.2 %
NITRITE UR QL STRIP: NEGATIVE
NRBC BLD AUTO-RTO: 0 %
PH UR STRIP: 6 [PH]
PLATELET # BLD AUTO: 300 X10(3)/MCL (ref 130–400)
PMV BLD AUTO: 9.1 FL (ref 7.4–10.4)
POTASSIUM SERPL-SCNC: 4.2 MMOL/L (ref 3.5–5.1)
PROT SERPL-MCNC: 8.1 GM/DL (ref 6.4–8.3)
PROT UR QL STRIP: ABNORMAL
RBC # BLD AUTO: 4.4 X10(6)/MCL (ref 4.7–6.1)
RBC #/AREA URNS AUTO: ABNORMAL /HPF
SODIUM SERPL-SCNC: 138 MMOL/L (ref 136–145)
SP GR UR STRIP.AUTO: 1.02 (ref 1–1.03)
SQUAMOUS #/AREA URNS LPF: ABNORMAL /HPF
TRIGL SERPL-MCNC: 216 MG/DL (ref 34–140)
UROBILINOGEN UR STRIP-ACNC: NORMAL
VLDLC SERPL CALC-MCNC: 43 MG/DL
WBC # BLD AUTO: 12.41 X10(3)/MCL (ref 4.5–11.5)
WBC #/AREA URNS AUTO: >100 /HPF

## 2025-04-09 PROCEDURE — 83036 HEMOGLOBIN GLYCOSYLATED A1C: CPT

## 2025-04-09 PROCEDURE — 3008F BODY MASS INDEX DOCD: CPT | Mod: CPTII,,,

## 2025-04-09 PROCEDURE — 3066F NEPHROPATHY DOC TX: CPT | Mod: CPTII,,,

## 2025-04-09 PROCEDURE — 87077 CULTURE AEROBIC IDENTIFY: CPT

## 2025-04-09 PROCEDURE — 85025 COMPLETE CBC W/AUTO DIFF WBC: CPT

## 2025-04-09 PROCEDURE — 3044F HG A1C LEVEL LT 7.0%: CPT | Mod: CPTII,,,

## 2025-04-09 PROCEDURE — 80053 COMPREHEN METABOLIC PANEL: CPT

## 2025-04-09 PROCEDURE — 99213 OFFICE O/P EST LOW 20 MIN: CPT | Mod: PBBFAC,PN

## 2025-04-09 PROCEDURE — 1160F RVW MEDS BY RX/DR IN RCRD: CPT | Mod: CPTII,,,

## 2025-04-09 PROCEDURE — 99214 OFFICE O/P EST MOD 30 MIN: CPT | Mod: S$PBB,,,

## 2025-04-09 PROCEDURE — 4010F ACE/ARB THERAPY RXD/TAKEN: CPT | Mod: CPTII,,,

## 2025-04-09 PROCEDURE — 3074F SYST BP LT 130 MM HG: CPT | Mod: CPTII,,,

## 2025-04-09 PROCEDURE — 1159F MED LIST DOCD IN RCRD: CPT | Mod: CPTII,,,

## 2025-04-09 PROCEDURE — 81001 URINALYSIS AUTO W/SCOPE: CPT

## 2025-04-09 PROCEDURE — 36415 COLL VENOUS BLD VENIPUNCTURE: CPT

## 2025-04-09 PROCEDURE — 80061 LIPID PANEL: CPT

## 2025-04-09 PROCEDURE — 3078F DIAST BP <80 MM HG: CPT | Mod: CPTII,,,

## 2025-04-09 PROCEDURE — 3061F NEG MICROALBUMINURIA REV: CPT | Mod: CPTII,,,

## 2025-04-09 RX ORDER — LOPERAMIDE HYDROCHLORIDE 2 MG/1
2 CAPSULE ORAL 4 TIMES DAILY PRN
COMMUNITY
End: 2025-04-09 | Stop reason: SDUPTHER

## 2025-04-09 RX ORDER — CEPHALEXIN 500 MG/1
500 CAPSULE ORAL EVERY 6 HOURS
Qty: 28 CAPSULE | Refills: 0 | Status: SHIPPED | OUTPATIENT
Start: 2025-04-09 | End: 2025-04-16

## 2025-04-09 RX ORDER — ALBUTEROL SULFATE 90 UG/1
2 INHALANT RESPIRATORY (INHALATION) EVERY 6 HOURS PRN
Qty: 18 G | Refills: 6 | Status: SHIPPED | OUTPATIENT
Start: 2025-04-09

## 2025-04-09 RX ORDER — BUPROPION HYDROCHLORIDE 150 MG/1
150 TABLET, EXTENDED RELEASE ORAL 2 TIMES DAILY
Qty: 90 TABLET | Refills: 3 | Status: SHIPPED | OUTPATIENT
Start: 2025-04-09

## 2025-04-09 RX ORDER — DIPHENOXYLATE HYDROCHLORIDE AND ATROPINE SULFATE 2.5; .025 MG/1; MG/1
TABLET ORAL
COMMUNITY
End: 2025-04-09 | Stop reason: SDUPTHER

## 2025-04-09 RX ORDER — ROSUVASTATIN CALCIUM 10 MG/1
10 TABLET, COATED ORAL NIGHTLY
Qty: 90 TABLET | Refills: 3 | Status: SHIPPED | OUTPATIENT
Start: 2025-04-09

## 2025-04-09 RX ORDER — LOPERAMIDE HYDROCHLORIDE 2 MG/1
2 CAPSULE ORAL 4 TIMES DAILY PRN
Qty: 90 CAPSULE | Refills: 3 | Status: SHIPPED | OUTPATIENT
Start: 2025-04-09

## 2025-04-09 RX ORDER — LEVOTHYROXINE SODIUM 125 UG/1
125 TABLET ORAL
Qty: 90 TABLET | Refills: 3 | Status: SHIPPED | OUTPATIENT
Start: 2025-04-09 | End: 2026-04-09

## 2025-04-09 RX ORDER — AZELASTINE 1 MG/ML
1 SPRAY, METERED NASAL 2 TIMES DAILY
Qty: 30 ML | Refills: 3 | Status: SHIPPED | OUTPATIENT
Start: 2025-04-09

## 2025-04-09 RX ORDER — PANTOPRAZOLE SODIUM 40 MG/1
40 TABLET, DELAYED RELEASE ORAL 2 TIMES DAILY
Qty: 180 TABLET | Refills: 3 | Status: SHIPPED | OUTPATIENT
Start: 2025-04-09

## 2025-04-09 RX ORDER — LOSARTAN POTASSIUM 25 MG/1
25 TABLET ORAL DAILY
Qty: 90 TABLET | Refills: 3 | Status: SHIPPED | OUTPATIENT
Start: 2025-04-09 | End: 2026-04-09

## 2025-04-09 RX ORDER — FLUOXETINE HYDROCHLORIDE 20 MG/1
20 CAPSULE ORAL EVERY MORNING
COMMUNITY
End: 2025-04-09

## 2025-04-09 RX ORDER — CETIRIZINE HYDROCHLORIDE 10 MG/1
10 TABLET ORAL DAILY
Qty: 90 TABLET | Refills: 3 | Status: SHIPPED | OUTPATIENT
Start: 2025-04-09 | End: 2028-01-06

## 2025-04-09 RX ORDER — UPADACITINIB 45 MG/1
TABLET, EXTENDED RELEASE ORAL
COMMUNITY
Start: 2025-02-20

## 2025-04-09 RX ORDER — BUSPIRONE HYDROCHLORIDE 7.5 MG/1
7.5 TABLET ORAL 3 TIMES DAILY
Qty: 270 TABLET | Refills: 3 | Status: SHIPPED | OUTPATIENT
Start: 2025-04-09

## 2025-04-09 RX ORDER — OXCARBAZEPINE 300 MG/1
300 TABLET, FILM COATED ORAL 2 TIMES DAILY
Qty: 90 TABLET | Refills: 3 | Status: SHIPPED | OUTPATIENT
Start: 2025-04-09

## 2025-04-09 RX ORDER — CEPHALEXIN 500 MG/1
500 CAPSULE ORAL EVERY 6 HOURS
Qty: 28 CAPSULE | Refills: 0 | Status: SHIPPED | OUTPATIENT
Start: 2025-04-09 | End: 2025-04-09

## 2025-04-09 RX ORDER — DIPHENOXYLATE HYDROCHLORIDE AND ATROPINE SULFATE 2.5; .025 MG/1; MG/1
2 TABLET ORAL 4 TIMES DAILY PRN
Qty: 240 TABLET | Refills: 3 | Status: SHIPPED | OUTPATIENT
Start: 2025-04-09

## 2025-04-09 RX ORDER — TRAZODONE HYDROCHLORIDE 100 MG/1
100 TABLET ORAL NIGHTLY PRN
Qty: 90 TABLET | Refills: 3 | Status: SHIPPED | OUTPATIENT
Start: 2025-04-09

## 2025-04-09 RX ORDER — ARIPIPRAZOLE 10 MG/1
10 TABLET ORAL DAILY
Qty: 90 TABLET | Refills: 3 | Status: SHIPPED | OUTPATIENT
Start: 2025-04-09

## 2025-04-09 NOTE — PROGRESS NOTES
[facility]  Opal Kaye MD    Patient ID: [id number]     Chief Complaint: Follow-up (Patient here for follow up. Requesting to have all medications switched to [facility]. )      HPI:     Sheila Hamilton is a 49 y.o. male here today for follow up of chronic conditions.    Hypertension  Blood pressure is 117/79 in clinic today.  Patient is compliant with losartan 25 mg daily.    Type 2 diabetes  Patient is currently on Janumet  mg b.i.d. he was on Ozempic, but this was discontinued.  A1c 5.0 in January.    Patient was admitted on 01/13/2025 for an sepsis secondary to abdominal wall abscess associated with Crohn's disease.  Patient had a colon resection with ileostomy June 2024 by Dr. Emanuel.    Patient reports burning when he urinates since yesterday. He last had a UTI in January. Denied fever or back pain.    Past Medical History:   Diagnosis Date    Anxiety     Crohn's colitis     Depression     Diabetes mellitus, type 2     GERD (gastroesophageal reflux disease)     Hypothyroidism         Past Surgical History:   Procedure Laterality Date    BOWEL RESECTION      CHOLECYSTECTOMY      EGD, WITH CLOSED BIOPSY N/A 05/07/2024    Procedure: EGD with biopsy;  Surgeon: Lawrence Trent MD;  Location: [facility] ENDOSCOPY;  Service: Gastroenterology;  Laterality: N/A;    HERNIA REPAIR      ILEOSCOPY THROUGH STOMA N/A 05/07/2024    Procedure: ileoscopy;  Surgeon: Lawrence Trent MD;  Location: [facility] ENDOSCOPY;  Service: Gastroenterology;  Laterality: N/A;    ILEOSTOMY Left     Ostomy Removal  07/16/2024        Social History     Tobacco Use    Smoking status: Never     Passive exposure: Never    Smokeless tobacco: Never   Substance and Sexual Activity    Alcohol use: Not Currently    Drug use: Never    Sexual activity: Yes     Partners: Female        Current Outpatient Medications   Medication Instructions    albuterol (PROVENTIL/VENTOLIN HFA) 90 mcg/actuation inhaler 2  "puffs, Inhalation, Every 6 hours PRN    ARIPiprazole (ABILIFY) 10 mg, Oral, Daily    azelastine (ASTELIN) 137 mcg, Nasal, 2 times daily    blood sugar diagnostic Strp 100 each, Misc.(Non-Drug; Combo Route), 3 times daily    blood-glucose meter kit Use as instructed    buPROPion (WELLBUTRIN SR) 150 mg, Oral, 2 times daily    busPIRone (BUSPAR) 7.5 mg, Oral, 3 times daily    cetirizine (ZYRTEC) 10 mg, Oral, Daily    diphenoxylate-atropine 2.5-0.025 mg (LOMOTIL) 2.5-0.025 mg per tablet 2 tablets, Oral, 4 times daily PRN    EScitalopram oxalate (LEXAPRO) 10 mg    furosemide (LASIX) 40 mg, Daily    levothyroxine (SYNTHROID) 125 mcg, Oral, Before breakfast    loperamide (IMODIUM) 2 mg, Oral, 4 times daily PRN    losartan (COZAAR) 25 mg, Oral, Daily    OXcarbazepine (TRILEPTAL) 300 mg, Oral, 2 times daily    pantoprazole (PROTONIX) 40 mg, Oral, 2 times daily    RINVOQ 45 mg Tb24 take 1 tablet by mouth once daily for 12 weeks    rosuvastatin (CRESTOR) 10 mg, Oral, Nightly    SITagliptan-metformin (JANUMET) 50-1,000 mg per tablet 1 tablet, Oral, 2 times daily with meals    traZODone (DESYREL) 100 mg, Oral, Nightly PRN       Review of patient's allergies indicates:   Allergen Reactions    Bee venom protein (honey bee)     Insect venom         Patient Care Team:  Alena Mcqueen MD as PCP - General (Family Medicine)     Subjective:     Review of Systems    12 point review of systems conducted, negative except as stated in the history of present illness. See HPI for details.    Objective:     Visit Vitals  /79 (BP Location: Left arm, Patient Position: Sitting)   Pulse 68   Temp 98.5 °F (36.9 °C) (Oral)   Ht 5' 10" (1.778 m)   Wt 91.6 kg (202 lb)   SpO2 100%   BMI 28.98 kg/m²       Physical Exam  Vitals and nursing note reviewed.   Constitutional:       General: He is not in acute distress.     Appearance: Normal appearance. He is not ill-appearing or diaphoretic.   HENT:      Head: Normocephalic and atraumatic.      " Mouth/Throat:      Mouth: Mucous membranes are moist.      Pharynx: Oropharynx is clear.   Eyes:      Extraocular Movements: Extraocular movements intact.      Conjunctiva/sclera: Conjunctivae normal.   Cardiovascular:      Rate and Rhythm: Normal rate and regular rhythm.      Pulses: Normal pulses.           Dorsalis pedis pulses are 2+ on the right side and 2+ on the left side.        Posterior tibial pulses are 2+ on the right side and 2+ on the left side.      Heart sounds: No murmur heard.  Pulmonary:      Effort: Pulmonary effort is normal. No respiratory distress.      Breath sounds: Normal breath sounds. No wheezing, rhonchi or rales.   Abdominal:      Comments: Midline scar. Ileostomy LLQ   Musculoskeletal:      Right lower leg: No edema.      Left lower leg: No edema.      Right foot: No deformity.      Left foot: No deformity.   Feet:      Right foot:      Protective Sensation: 5 sites tested.  5 sites sensed.      Skin integrity: Skin integrity normal. No ulcer, blister, skin breakdown, erythema, warmth, callus, dry skin or fissure.      Toenail Condition: Right toenails are normal.      Left foot:      Protective Sensation: 5 sites tested.  3 sites sensed.      Skin integrity: Skin integrity normal. No ulcer, blister, skin breakdown, erythema, warmth, callus, dry skin or fissure.      Toenail Condition: Left toenails are normal.   Skin:     General: Skin is warm and dry.   Neurological:      General: No focal deficit present.      Mental Status: He is alert and oriented to person, place, and time. Mental status is at baseline.   Psychiatric:         Mood and Affect: Mood normal.         Labs Reviewed:     Chemistry:  Lab Results   Component Value Date     (L) 01/03/2025    K 4.5 01/03/2025    BUN 17.4 01/03/2025    CREATININE 1.23 01/03/2025    EGFRNORACEVR >60 01/03/2025    GLUCOSE 141 (H) 01/03/2025    CALCIUM 9.6 01/03/2025    ALKPHOS 70 01/30/2024    LABPROT 16.4 (H) 01/14/2025    ALBUMIN 4.1  01/30/2024    BILIDIR 0.4 04/22/2022    IBILI 0.60 04/22/2022    AST 27 01/30/2024    ALT 36 01/30/2024    TSH 3.151 10/14/2024    PYRKOG3HFKW 1.04 04/30/2024    PSA 1.81 09/28/2023        Lab Results   Component Value Date    HGBA1C 5.0 01/03/2025        Hematology:  Lab Results   Component Value Date    WBC 7.57 01/30/2024    HGB 6.4 (L) 11/30/2024    HCT 20.9 (L) 11/30/2024     01/30/2024       Lipid Panel:  Lab Results   Component Value Date    CHOL 85 01/30/2024    HDL 27 (L) 01/30/2024    LDL 30.00 (L) 01/30/2024    TRIG 142 (H) 01/30/2024    TOTALCHOLEST 3 01/30/2024        Urine:  Lab Results   Component Value Date    APPEARANCEUA Turbid (A) 09/28/2023    SGUA 1.021 09/28/2023    PROTEINUA Trace (A) 09/28/2023    KETONESUA Trace (A) 09/28/2023    LEUKOCYTESUR 500 (A) 09/28/2023    RBCUA 0-5 09/28/2023    WBCUA >100 (A) 09/28/2023    BACTERIA Trace (A) 09/28/2023    SQEPUA Trace (A) 09/28/2023    HYALINECASTS None Seen 09/28/2023    CREATRANDUR 264.9 (H) 01/03/2025        Assessment:       ICD-10-CM ICD-9-CM   1. Primary hypertension  I10 401.9   2. Type 2 diabetes mellitus without complication, without long-term current use of insulin  E11.9 250.00   3. Hypothyroidism due to acquired atrophy of thyroid  E03.4 244.8     246.8   4. Hyperlipidemia, unspecified hyperlipidemia type  E78.5 272.4   5. Gastroesophageal reflux disease, unspecified whether esophagitis present  K21.9 530.81   6. High output ileostomy  R19.8 787.99    Z93.2 V44.2   7. Schizophrenia, unspecified type  F20.9 295.90   8. Dysuria  R30.0 788.1        Plan:     1. Primary hypertension  Assessment & Plan:  Well controlled.   Hypertension Medications              furosemide (LASIX) 40 MG tablet Take 40 mg by mouth once daily.    losartan (COZAAR) 25 MG tablet Take 1 tablet (25 mg total) by mouth once daily.          AHA/ACC goal <130/80. JNC8 goal <140/90 (all ages if DM or CKD OR <60), <150/90 (>60 w/o CKD or DM)  Low Sodium Diet (DASH  Diet - Less than 2 grams of sodium per day).  Monitor blood pressure daily and log. Report consistent numbers greater than 140/90.  Maintain healthy weight with goal BMI <30. Exercise 30 minutes per day, 5 days per week.  Smoking cessation encouraged to aid in BP reduction.      Orders:  -     CBC Auto Differential  -     Comprehensive Metabolic Panel  -     losartan (COZAAR) 25 MG tablet; Take 1 tablet (25 mg total) by mouth once daily.  Dispense: 90 tablet; Refill: 3    2. Type 2 diabetes mellitus without complication, without long-term current use of insulin  Assessment & Plan:  A1c today. Foot exam done today    Lab Results   Component Value Date    HGBA1C 5.0 01/03/2025    HGBA1C 6.3 07/16/2024    HGBA1C 7.3 (H) 04/30/2024    HGBA1C 6.1 07/05/2022    LDL 30.00 (L) 01/30/2024    CREATININE 1.23 01/03/2025    CREATININE 0.7 (L) 12/23/2024      Diabetes Medications              SITagliptan-metformin (JANUMET) 50-1,000 mg per tablet Take 1 tablet by mouth 2 (two) times daily with meals.          On ACE and Statin according to guidelines.  Discussed caution with SGLT2s + diuretics as concomitant use can cause volume depletion. Discussed caution with DPP-Ivs and HF risk.  Follow ADA Diet. Avoid soda, sweets, and limit carbs (no more than 45-50 grams per meal).  Maintain healthy weight with goal BMI <30.  Exercise 5 times per week for 30 minutes per day.  Stressed importance of daily foot exams.  Up to date on DM retinal and foot exams.           Orders:  -     Lipid Panel  -     Hemoglobin A1C  -     SITagliptan-metformin (JANUMET) 50-1,000 mg per tablet; Take 1 tablet by mouth 2 (two) times daily with meals.  Dispense: 180 tablet; Refill: 3    3. Hypothyroidism due to acquired atrophy of thyroid  Assessment & Plan:  Continue Synthroid currrent dose    Orders:  -     levothyroxine (SYNTHROID) 125 MCG tablet; Take 1 tablet (125 mcg total) by mouth before breakfast.  Dispense: 90 tablet; Refill: 3    4. Hyperlipidemia,  unspecified hyperlipidemia type  -     rosuvastatin (CRESTOR) 10 MG tablet; Take 1 tablet (10 mg total) by mouth every evening.  Dispense: 90 tablet; Refill: 3    5. Gastroesophageal reflux disease, unspecified whether esophagitis present  -     pantoprazole (PROTONIX) 40 MG tablet; Take 1 tablet (40 mg total) by mouth 2 (two) times daily.  Dispense: 180 tablet; Refill: 3    6. High output ileostomy  -     diphenoxylate-atropine 2.5-0.025 mg (LOMOTIL) 2.5-0.025 mg per tablet; Take 2 tablets by mouth 4 (four) times daily as needed for Diarrhea.  Dispense: 240 tablet; Refill: 3  -     pantoprazole (PROTONIX) 40 MG tablet; Take 1 tablet (40 mg total) by mouth 2 (two) times daily.  Dispense: 180 tablet; Refill: 3    7. Schizophrenia, unspecified type  Assessment & Plan:  Well controlled on Abilify.       8. Dysuria  -     Urinalysis  -     Urine Culture High Risk    Other orders  -     albuterol (PROVENTIL/VENTOLIN HFA) 90 mcg/actuation inhaler; Inhale 2 puffs into the lungs every 6 (six) hours as needed for Wheezing.  Dispense: 18 g; Refill: 6  -     ARIPiprazole (ABILIFY) 10 MG Tab; Take 1 tablet (10 mg total) by mouth once daily.  Dispense: 90 tablet; Refill: 3  -     azelastine (ASTELIN) 137 mcg (0.1 %) nasal spray; 1 spray (137 mcg total) by Nasal route 2 (two) times daily.  Dispense: 30 mL; Refill: 3  -     buPROPion (WELLBUTRIN SR) 150 MG TBSR 12 hr tablet; Take 1 tablet (150 mg total) by mouth 2 (two) times daily.  Dispense: 90 tablet; Refill: 3  -     busPIRone (BUSPAR) 7.5 MG tablet; Take 1 tablet (7.5 mg total) by mouth 3 (three) times daily.  Dispense: 270 tablet; Refill: 3  -     cetirizine (ZYRTEC) 10 MG tablet; Take 1 tablet (10 mg total) by mouth once daily.  Dispense: 90 tablet; Refill: 3  -     loperamide (IMODIUM) 2 mg capsule; Take 1 capsule (2 mg total) by mouth 4 (four) times daily as needed for Diarrhea.  Dispense: 90 capsule; Refill: 3  -     OXcarbazepine (TRILEPTAL) 300 MG Tab; Take 1 tablet  (300 mg total) by mouth 2 (two) times daily.  Dispense: 90 tablet; Refill: 3  -     traZODone (DESYREL) 100 MG tablet; Take 1 tablet (100 mg total) by mouth nightly as needed for Insomnia.  Dispense: 90 tablet; Refill: 3         No follow-ups on file. In addition to their scheduled follow up, the patient has also been instructed to follow up on as needed basis.     No future appointments.       Opal Kaye MD

## 2025-04-09 NOTE — ASSESSMENT & PLAN NOTE
A1c today. Foot exam done today    Lab Results   Component Value Date    HGBA1C 5.0 01/03/2025    HGBA1C 6.3 07/16/2024    HGBA1C 7.3 (H) 04/30/2024    HGBA1C 6.1 07/05/2022    LDL 30.00 (L) 01/30/2024    CREATININE 1.23 01/03/2025    CREATININE 0.7 (L) 12/23/2024      Diabetes Medications              SITagliptan-metformin (JANUMET) 50-1,000 mg per tablet Take 1 tablet by mouth 2 (two) times daily with meals.          On ACE and Statin according to guidelines.  Discussed caution with SGLT2s + diuretics as concomitant use can cause volume depletion. Discussed caution with DPP-Ivs and HF risk.  Follow ADA Diet. Avoid soda, sweets, and limit carbs (no more than 45-50 grams per meal).  Maintain healthy weight with goal BMI <30.  Exercise 5 times per week for 30 minutes per day.  Stressed importance of daily foot exams.  Up to date on DM retinal and foot exams.

## 2025-04-11 ENCOUNTER — PATIENT MESSAGE (OUTPATIENT)
Dept: FAMILY MEDICINE | Facility: CLINIC | Age: 50
End: 2025-04-11
Payer: MEDICARE

## 2025-04-11 NOTE — TELEPHONE ENCOUNTER
Called patient.  confirmed. Patient notified of UTI. He would like to have the medication sent to [facility] in [place] on [address]

## 2025-04-12 LAB — BACTERIA UR CULT: ABNORMAL

## 2025-05-28 ENCOUNTER — OFFICE VISIT (OUTPATIENT)
Dept: FAMILY MEDICINE | Facility: CLINIC | Age: 50
End: 2025-05-28
Payer: MEDICARE

## 2025-05-28 VITALS
TEMPERATURE: 98 F | OXYGEN SATURATION: 99 % | HEIGHT: 70 IN | WEIGHT: 212 LBS | RESPIRATION RATE: 18 BRPM | SYSTOLIC BLOOD PRESSURE: 118 MMHG | BODY MASS INDEX: 30.35 KG/M2 | HEART RATE: 76 BPM | DIASTOLIC BLOOD PRESSURE: 78 MMHG

## 2025-05-28 DIAGNOSIS — I10 PRIMARY HYPERTENSION: Primary | ICD-10-CM

## 2025-05-28 DIAGNOSIS — E78.5 HYPERLIPIDEMIA, UNSPECIFIED HYPERLIPIDEMIA TYPE: ICD-10-CM

## 2025-05-28 DIAGNOSIS — Z93.2 HIGH OUTPUT ILEOSTOMY: ICD-10-CM

## 2025-05-28 DIAGNOSIS — E11.9 TYPE 2 DIABETES MELLITUS WITHOUT COMPLICATION, WITHOUT LONG-TERM CURRENT USE OF INSULIN: ICD-10-CM

## 2025-05-28 DIAGNOSIS — E03.4 HYPOTHYROIDISM DUE TO ACQUIRED ATROPHY OF THYROID: ICD-10-CM

## 2025-05-28 DIAGNOSIS — K21.9 GASTROESOPHAGEAL REFLUX DISEASE, UNSPECIFIED WHETHER ESOPHAGITIS PRESENT: ICD-10-CM

## 2025-05-28 DIAGNOSIS — F20.9 SCHIZOPHRENIA, UNSPECIFIED TYPE: ICD-10-CM

## 2025-05-28 DIAGNOSIS — R19.8 HIGH OUTPUT ILEOSTOMY: ICD-10-CM

## 2025-05-28 PROBLEM — E66.01 MORBID (SEVERE) OBESITY DUE TO EXCESS CALORIES: Status: ACTIVE | Noted: 2025-05-28

## 2025-05-28 PROBLEM — E66.01 MORBID (SEVERE) OBESITY DUE TO EXCESS CALORIES: Status: RESOLVED | Noted: 2025-05-28 | Resolved: 2025-05-28

## 2025-05-28 PROCEDURE — 1160F RVW MEDS BY RX/DR IN RCRD: CPT | Mod: CPTII,,,

## 2025-05-28 PROCEDURE — 3066F NEPHROPATHY DOC TX: CPT | Mod: CPTII,,,

## 2025-05-28 PROCEDURE — 3008F BODY MASS INDEX DOCD: CPT | Mod: CPTII,,,

## 2025-05-28 PROCEDURE — 99214 OFFICE O/P EST MOD 30 MIN: CPT | Mod: S$PBB,,,

## 2025-05-28 PROCEDURE — 3044F HG A1C LEVEL LT 7.0%: CPT | Mod: CPTII,,,

## 2025-05-28 PROCEDURE — 4010F ACE/ARB THERAPY RXD/TAKEN: CPT | Mod: CPTII,,,

## 2025-05-28 PROCEDURE — 3074F SYST BP LT 130 MM HG: CPT | Mod: CPTII,,,

## 2025-05-28 PROCEDURE — 3078F DIAST BP <80 MM HG: CPT | Mod: CPTII,,,

## 2025-05-28 PROCEDURE — 3061F NEG MICROALBUMINURIA REV: CPT | Mod: CPTII,,,

## 2025-05-28 PROCEDURE — 1159F MED LIST DOCD IN RCRD: CPT | Mod: CPTII,,,

## 2025-05-28 PROCEDURE — 99213 OFFICE O/P EST LOW 20 MIN: CPT | Mod: PBBFAC,PN

## 2025-05-28 RX ORDER — LOPERAMIDE HYDROCHLORIDE 2 MG/1
2 CAPSULE ORAL 3 TIMES DAILY PRN
Qty: 180 CAPSULE | Refills: 3 | Status: SHIPPED | OUTPATIENT
Start: 2025-05-28

## 2025-05-28 RX ORDER — ESCITALOPRAM OXALATE 10 MG/1
10 TABLET ORAL DAILY
Qty: 90 TABLET | Refills: 3 | Status: SHIPPED | OUTPATIENT
Start: 2025-05-28

## 2025-05-28 RX ORDER — ARIPIPRAZOLE 10 MG/1
10 TABLET ORAL DAILY
Qty: 90 TABLET | Refills: 3 | Status: SHIPPED | OUTPATIENT
Start: 2025-05-28

## 2025-05-28 RX ORDER — AZELASTINE 1 MG/ML
1 SPRAY, METERED NASAL 2 TIMES DAILY
Qty: 30 ML | Refills: 3 | Status: SHIPPED | OUTPATIENT
Start: 2025-05-28

## 2025-05-28 RX ORDER — LEVOTHYROXINE SODIUM 125 UG/1
125 TABLET ORAL
Qty: 90 TABLET | Refills: 3 | Status: SHIPPED | OUTPATIENT
Start: 2025-05-28 | End: 2026-05-28

## 2025-05-28 RX ORDER — ROSUVASTATIN CALCIUM 10 MG/1
10 TABLET, COATED ORAL NIGHTLY
Qty: 90 TABLET | Refills: 3 | Status: SHIPPED | OUTPATIENT
Start: 2025-05-28

## 2025-05-28 RX ORDER — BUPROPION HYDROCHLORIDE 150 MG/1
150 TABLET, EXTENDED RELEASE ORAL 2 TIMES DAILY
Qty: 180 TABLET | Refills: 3 | Status: SHIPPED | OUTPATIENT
Start: 2025-05-28

## 2025-05-28 RX ORDER — CETIRIZINE HYDROCHLORIDE 10 MG/1
10 TABLET ORAL DAILY
Qty: 90 TABLET | Refills: 3 | Status: SHIPPED | OUTPATIENT
Start: 2025-05-28 | End: 2028-02-24

## 2025-05-28 RX ORDER — BUSPIRONE HYDROCHLORIDE 7.5 MG/1
7.5 TABLET ORAL 3 TIMES DAILY
Qty: 270 TABLET | Refills: 3 | Status: SHIPPED | OUTPATIENT
Start: 2025-05-28

## 2025-05-28 RX ORDER — OXCARBAZEPINE 300 MG/1
300 TABLET, FILM COATED ORAL 2 TIMES DAILY
Qty: 180 TABLET | Refills: 3 | Status: SHIPPED | OUTPATIENT
Start: 2025-05-28

## 2025-05-28 RX ORDER — FUROSEMIDE 40 MG/1
40 TABLET ORAL DAILY
Qty: 90 TABLET | Refills: 3 | Status: SHIPPED | OUTPATIENT
Start: 2025-05-28

## 2025-05-28 RX ORDER — INSULIN PUMP SYRINGE, 3 ML
EACH MISCELLANEOUS
Qty: 1 EACH | Refills: 0 | Status: SHIPPED | OUTPATIENT
Start: 2025-05-28 | End: 2026-05-28

## 2025-05-28 RX ORDER — LOSARTAN POTASSIUM 25 MG/1
25 TABLET ORAL DAILY
Qty: 90 TABLET | Refills: 3 | Status: SHIPPED | OUTPATIENT
Start: 2025-05-28 | End: 2026-05-28

## 2025-05-28 RX ORDER — TRAZODONE HYDROCHLORIDE 100 MG/1
100 TABLET ORAL NIGHTLY PRN
Qty: 90 TABLET | Refills: 3 | Status: SHIPPED | OUTPATIENT
Start: 2025-05-28

## 2025-05-28 RX ORDER — PANTOPRAZOLE SODIUM 40 MG/1
40 TABLET, DELAYED RELEASE ORAL 2 TIMES DAILY
Qty: 180 TABLET | Refills: 3 | Status: SHIPPED | OUTPATIENT
Start: 2025-05-28

## 2025-05-28 RX ORDER — ALBUTEROL SULFATE 90 UG/1
2 INHALANT RESPIRATORY (INHALATION) EVERY 6 HOURS PRN
Qty: 18 G | Refills: 6 | Status: SHIPPED | OUTPATIENT
Start: 2025-05-28

## 2025-05-28 RX ORDER — DIPHENOXYLATE HYDROCHLORIDE AND ATROPINE SULFATE 2.5; .025 MG/1; MG/1
2 TABLET ORAL 4 TIMES DAILY PRN
Qty: 240 TABLET | Refills: 3 | Status: SHIPPED | OUTPATIENT
Start: 2025-05-28

## 2025-05-28 NOTE — ASSESSMENT & PLAN NOTE
LDL at goal of less than 70 for diabetics.  Triglycerides elevated, but patient was not fasting.  Continue Crestor 10 mg daily.  Lab Results   Component Value Date    LDL 30.00 (L) 04/09/2025    TRIG 216 (H) 04/09/2025    HDL 29 (L) 04/09/2025    TOTALCHOLEST 4 04/09/2025     Educated on importance of dietary modifications. Follow a low cholesterol, low saturated fat diet with less that 200mg of cholesterol a day.  Avoid fried foods and high saturated fats (high saturated fats less than 7% of calories).  Increase dietary fiber.  Regular exercise can reduce LDL and raise HDL. Stressed importance of physical activity 5 times per week for 30 minutes per day.

## 2025-05-28 NOTE — ASSESSMENT & PLAN NOTE
At goal A1c of less than 7.  Continue Janumet.    Lab Results   Component Value Date    HGBA1C 5.7 04/09/2025    HGBA1C 5.0 01/03/2025    HGBA1C 6.3 07/16/2024    HGBA1C 6.1 07/05/2022    LDL 30.00 (L) 04/09/2025    CREATININE 1.16 04/09/2025    CREATININE 0.7 (L) 12/23/2024      Diabetes Medications              SITagliptan-metformin (JANUMET) 50-1,000 mg per tablet Take 1 tablet by mouth 2 (two) times daily with meals.          On ACE and Statin according to guidelines.  Discussed caution with SGLT2s + diuretics as concomitant use can cause volume depletion. Discussed caution with DPP-Ivs and HF risk.  Follow ADA Diet. Avoid soda, sweets, and limit carbs (no more than 45-50 grams per meal).  Maintain healthy weight with goal BMI <30.  Exercise 5 times per week for 30 minutes per day.  Stressed importance of daily foot exams.  Up to date on DM retinal and foot exams.

## 2025-05-28 NOTE — ASSESSMENT & PLAN NOTE
Normal thyroid studies 7 months ago.  Needs TSH with next set of labs.  Continue current dose of Synthroid

## 2025-05-28 NOTE — ASSESSMENT & PLAN NOTE
Well controlled.   Hypertension Medications              furosemide (LASIX) 40 MG tablet Take 40 mg by mouth once daily.    losartan (COZAAR) 25 MG tablet Take 1 tablet (25 mg total) by mouth once daily.          AHA/ACC goal <130/80. JNC8 goal <140/90 (all ages if DM or CKD OR <60), <150/90 (>60 w/o CKD or DM)  Low Sodium Diet (DASH Diet - Less than 2 grams of sodium per day).  Monitor blood pressure daily and log. Report consistent numbers greater than 140/90.  Maintain healthy weight with goal BMI <30. Exercise 30 minutes per day, 5 days per week.  Smoking cessation encouraged to aid in BP reduction.

## 2025-05-28 NOTE — PROGRESS NOTES
[facility]  Opal Kaye MD    Patient ID: [id number]     Chief Complaint: Follow-up (Pt is here for follow up. Pt has no complaints today)      HPI:     Sheila Hamilton is a 49 y.o. male with hypertension, type 2 diabetes, hypothyroidism, hypogonadism, schizophrenia here today for of chronic conditions.    Type 2 diabetes  Patient is compliant with Janumet.  A1c 1 month ago was 5.7.      Hypertension  Blood pressure 118/78 in clinic today.  Patient is compliant with losartan 25 mg daily.    Hyperlipidemia  LDL 30, triglyceride 216, but patient was not fasting.    Past Medical History:   Diagnosis Date    Anxiety     Crohn's colitis     Depression     Diabetes mellitus, type 2     GERD (gastroesophageal reflux disease)     Hypothyroidism         Past Surgical History:   Procedure Laterality Date    BOWEL RESECTION      CHOLECYSTECTOMY      EGD, WITH CLOSED BIOPSY N/A 05/07/2024    Procedure: EGD with biopsy;  Surgeon: Lawrence Trent MD;  Location: [facility] ENDOSCOPY;  Service: Gastroenterology;  Laterality: N/A;    HERNIA REPAIR      ILEOSCOPY THROUGH STOMA N/A 05/07/2024    Procedure: ileoscopy;  Surgeon: Lawrence Trent MD;  Location: [facility] ENDOSCOPY;  Service: Gastroenterology;  Laterality: N/A;    ILEOSTOMY Left     Ostomy Removal  07/16/2024        Social History     Tobacco Use    Smoking status: Never     Passive exposure: Never    Smokeless tobacco: Never   Substance and Sexual Activity    Alcohol use: Not Currently    Drug use: Never    Sexual activity: Yes     Partners: Female        Current Outpatient Medications   Medication Instructions    albuterol (PROVENTIL/VENTOLIN HFA) 90 mcg/actuation inhaler 2 puffs, Inhalation, Every 6 hours PRN    ARIPiprazole (ABILIFY) 10 mg, Oral, Daily    azelastine (ASTELIN) 137 mcg, Nasal, 2 times daily    blood sugar diagnostic Strp 100 each, Misc.(Non-Drug; Combo Route), 3 times daily    blood-glucose meter kit Use as instructed     "buPROPion (WELLBUTRIN SR) 150 mg, Oral, 2 times daily    busPIRone (BUSPAR) 7.5 mg, Oral, 3 times daily    cetirizine (ZYRTEC) 10 mg, Oral, Daily    diphenoxylate-atropine 2.5-0.025 mg (LOMOTIL) 2.5-0.025 mg per tablet 2 tablets, Oral, 4 times daily PRN    EScitalopram oxalate (LEXAPRO) 10 mg, Oral, Daily    furosemide (LASIX) 40 mg, Oral, Daily    levothyroxine (SYNTHROID) 125 mcg, Oral, Before breakfast    loperamide (IMODIUM) 2 mg, Oral, 3 times daily PRN    losartan (COZAAR) 25 mg, Oral, Daily    OXcarbazepine (TRILEPTAL) 300 mg, Oral, 2 times daily    pantoprazole (PROTONIX) 40 mg, Oral, 2 times daily    RINVOQ 45 mg Tb24 take 1 tablet by mouth once daily for 12 weeks    rosuvastatin (CRESTOR) 10 mg, Oral, Nightly    SITagliptan-metformin (JANUMET) 50-1,000 mg per tablet 1 tablet, Oral, 2 times daily with meals    traZODone (DESYREL) 100 mg, Oral, Nightly PRN       Review of patient's allergies indicates:   Allergen Reactions    Bee venom protein (honey bee)     Insect venom         No care team member to display     Subjective:     Review of Systems    12 point review of systems conducted, negative except as stated in the history of present illness. See HPI for details.    Objective:     Visit Vitals  /78 (BP Location: Left arm, Patient Position: Sitting)   Pulse 76   Temp 98.4 °F (36.9 °C) (Oral)   Resp 18   Ht 5' 10" (1.778 m)   Wt 96.2 kg (212 lb)   SpO2 99%   BMI 30.42 kg/m²       Physical Exam  Vitals and nursing note reviewed.   Constitutional:       General: He is not in acute distress.     Appearance: Normal appearance. He is not ill-appearing or diaphoretic.   HENT:      Head: Normocephalic and atraumatic.      Mouth/Throat:      Mouth: Mucous membranes are moist.      Pharynx: Oropharynx is clear.   Eyes:      Extraocular Movements: Extraocular movements intact.      Conjunctiva/sclera: Conjunctivae normal.   Cardiovascular:      Rate and Rhythm: Normal rate and regular rhythm.      Pulses: " Normal pulses.      Heart sounds: No murmur heard.  Pulmonary:      Effort: Pulmonary effort is normal. No respiratory distress.      Breath sounds: Normal breath sounds. No wheezing, rhonchi or rales.   Musculoskeletal:      Right lower leg: No edema.      Left lower leg: No edema.   Skin:     General: Skin is warm and dry.   Neurological:      General: No focal deficit present.      Mental Status: He is alert and oriented to person, place, and time. Mental status is at baseline.   Psychiatric:         Mood and Affect: Mood normal.         Labs Reviewed:     Chemistry:  Lab Results   Component Value Date     04/09/2025    K 4.2 04/09/2025    BUN 16.8 04/09/2025    CREATININE 1.16 04/09/2025    EGFRNORACEVR >60 04/09/2025    CALCIUM 9.2 04/09/2025    ALKPHOS 88 04/09/2025    LABPROT 16.4 (H) 01/14/2025    ALBUMIN 4.0 04/09/2025    BILIDIR 0.4 04/22/2022    IBILI 0.60 04/22/2022    AST 43 04/09/2025    ALT 40 04/09/2025    TSH 3.151 10/14/2024    VMTPGN2YRJU 1.04 04/30/2024    PSA 1.81 09/28/2023        Lab Results   Component Value Date    HGBA1C 5.7 04/09/2025        Hematology:  Lab Results   Component Value Date    WBC 12.41 (H) 04/09/2025    HGB 12.7 (L) 04/09/2025    HCT 37.5 (L) 04/09/2025     04/09/2025       Lipid Panel:  Lab Results   Component Value Date    CHOL 102 04/09/2025    HDL 29 (L) 04/09/2025    LDL 30.00 (L) 04/09/2025    TRIG 216 (H) 04/09/2025    TOTALCHOLEST 4 04/09/2025        Urine:  Lab Results   Component Value Date    APPEARANCEUA Extra Turbid (A) 04/09/2025    SGUA 1.025 04/09/2025    PROTEINUA 2+ (A) 04/09/2025    KETONESUA Negative 04/09/2025    LEUKOCYTESUR 500 (A) 04/09/2025    RBCUA 0-5 04/09/2025    WBCUA >100 (A) 04/09/2025    BACTERIA Rare 04/09/2025    SQEPUA Rare 04/09/2025    HYALINECASTS None Seen 09/28/2023    CREATRANDUR 264.9 (H) 01/03/2025        Assessment:       ICD-10-CM ICD-9-CM   1. Primary hypertension  I10 401.9   2. Gastroesophageal reflux disease,  unspecified whether esophagitis present  K21.9 530.81   3. High output ileostomy  R19.8 787.99    Z93.2 V44.2   4. Hypothyroidism due to acquired atrophy of thyroid  E03.4 244.8     246.8   5. Hyperlipidemia, unspecified hyperlipidemia type  E78.5 272.4   6. Type 2 diabetes mellitus without complication, without long-term current use of insulin  E11.9 250.00   7. Schizophrenia, unspecified type  F20.9 295.90        Plan:     1. Primary hypertension  Assessment & Plan:  Well controlled.   Hypertension Medications              furosemide (LASIX) 40 MG tablet Take 40 mg by mouth once daily.    losartan (COZAAR) 25 MG tablet Take 1 tablet (25 mg total) by mouth once daily.          AHA/ACC goal <130/80. JNC8 goal <140/90 (all ages if DM or CKD OR <60), <150/90 (>60 w/o CKD or DM)  Low Sodium Diet (DASH Diet - Less than 2 grams of sodium per day).  Monitor blood pressure daily and log. Report consistent numbers greater than 140/90.  Maintain healthy weight with goal BMI <30. Exercise 30 minutes per day, 5 days per week.  Smoking cessation encouraged to aid in BP reduction.      Orders:  -     losartan (COZAAR) 25 MG tablet; Take 1 tablet (25 mg total) by mouth once daily.  Dispense: 90 tablet; Refill: 3    2. Gastroesophageal reflux disease, unspecified whether esophagitis present  -     pantoprazole (PROTONIX) 40 MG tablet; Take 1 tablet (40 mg total) by mouth 2 (two) times daily.  Dispense: 180 tablet; Refill: 3    3. High output ileostomy  Assessment & Plan:  Continue Lomotil and Imodium as prescribed by GI for high output ileostomy    Orders:  -     diphenoxylate-atropine 2.5-0.025 mg (LOMOTIL) 2.5-0.025 mg per tablet; Take 2 tablets by mouth 4 (four) times daily as needed for Diarrhea.  Dispense: 240 tablet; Refill: 3  -     loperamide (IMODIUM) 2 mg capsule; Take 1 capsule (2 mg total) by mouth 3 (three) times daily as needed for Diarrhea.  Dispense: 180 capsule; Refill: 3    4. Hypothyroidism due to acquired  atrophy of thyroid  Assessment & Plan:  Normal thyroid studies 7 months ago.  Needs TSH with next set of labs.  Continue current dose of Synthroid    Orders:  -     levothyroxine (SYNTHROID) 125 MCG tablet; Take 1 tablet (125 mcg total) by mouth before breakfast.  Dispense: 90 tablet; Refill: 3    5. Hyperlipidemia, unspecified hyperlipidemia type  Assessment & Plan:  LDL at goal of less than 70 for diabetics.  Triglycerides elevated, but patient was not fasting.  Continue Crestor 10 mg daily.  Lab Results   Component Value Date    LDL 30.00 (L) 04/09/2025    TRIG 216 (H) 04/09/2025    HDL 29 (L) 04/09/2025    TOTALCHOLEST 4 04/09/2025     Educated on importance of dietary modifications. Follow a low cholesterol, low saturated fat diet with less that 200mg of cholesterol a day.  Avoid fried foods and high saturated fats (high saturated fats less than 7% of calories).  Increase dietary fiber.  Regular exercise can reduce LDL and raise HDL. Stressed importance of physical activity 5 times per week for 30 minutes per day.       Orders:  -     rosuvastatin (CRESTOR) 10 MG tablet; Take 1 tablet (10 mg total) by mouth every evening.  Dispense: 90 tablet; Refill: 3    6. Type 2 diabetes mellitus without complication, without long-term current use of insulin  Assessment & Plan:  At goal A1c of less than 7.  Continue Janumet.    Lab Results   Component Value Date    HGBA1C 5.7 04/09/2025    HGBA1C 5.0 01/03/2025    HGBA1C 6.3 07/16/2024    HGBA1C 6.1 07/05/2022    LDL 30.00 (L) 04/09/2025    CREATININE 1.16 04/09/2025    CREATININE 0.7 (L) 12/23/2024      Diabetes Medications              SITagliptan-metformin (JANUMET) 50-1,000 mg per tablet Take 1 tablet by mouth 2 (two) times daily with meals.          On ACE and Statin according to guidelines.  Discussed caution with SGLT2s + diuretics as concomitant use can cause volume depletion. Discussed caution with DPP-Ivs and HF risk.  Follow ADA Diet. Avoid soda, sweets, and  limit carbs (no more than 45-50 grams per meal).  Maintain healthy weight with goal BMI <30.  Exercise 5 times per week for 30 minutes per day.  Stressed importance of daily foot exams.  Up to date on DM retinal and foot exams.           Orders:  -     SITagliptan-metformin (JANUMET) 50-1,000 mg per tablet; Take 1 tablet by mouth 2 (two) times daily with meals.  Dispense: 180 tablet; Refill: 3    7. Schizophrenia, unspecified type  Overview:  Trileptal 300 mg b.i.d.  Abilify 10 mg daily  Lexapro 10 mg daily  Wellbutrin 150 mg b.i.d.  BuSpar 7.5 mg TID  Trazodone 100 mg nightly PRN for insomnia    Assessment & Plan:  Stable, followed by Psychiatry.  Medications refilled per request today.    Orders:  -     busPIRone (BUSPAR) 7.5 MG tablet; Take 1 tablet (7.5 mg total) by mouth 3 (three) times daily.  Dispense: 270 tablet; Refill: 3  -     OXcarbazepine (TRILEPTAL) 300 MG Tab; Take 1 tablet (300 mg total) by mouth 2 (two) times daily.  Dispense: 180 tablet; Refill: 3  -     buPROPion (WELLBUTRIN SR) 150 MG TBSR 12 hr tablet; Take 1 tablet (150 mg total) by mouth 2 (two) times daily.  Dispense: 180 tablet; Refill: 3  -     traZODone (DESYREL) 100 MG tablet; Take 1 tablet (100 mg total) by mouth nightly as needed for Insomnia.  Dispense: 90 tablet; Refill: 3  -     ARIPiprazole (ABILIFY) 10 MG Tab; Take 1 tablet (10 mg total) by mouth once daily.  Dispense: 90 tablet; Refill: 3    Other orders  -     furosemide (LASIX) 40 MG tablet; Take 1 tablet (40 mg total) by mouth once daily.  Dispense: 90 tablet; Refill: 3  -     albuterol (PROVENTIL/VENTOLIN HFA) 90 mcg/actuation inhaler; Inhale 2 puffs into the lungs every 6 (six) hours as needed for Wheezing.  Dispense: 18 g; Refill: 6  -     cetirizine (ZYRTEC) 10 MG tablet; Take 1 tablet (10 mg total) by mouth once daily.  Dispense: 90 tablet; Refill: 3  -     blood-glucose meter kit; Use as instructed  Dispense: 1 each; Refill: 0  -     EScitalopram oxalate (LEXAPRO) 10 MG  tablet; Take 1 tablet (10 mg total) by mouth once daily.  Dispense: 90 tablet; Refill: 3  -     azelastine (ASTELIN) 137 mcg (0.1 %) nasal spray; 1 spray (137 mcg total) by Nasal route 2 (two) times daily.  Dispense: 30 mL; Refill: 3         Follow up in about 3 months (around 8/28/2025). In addition to their scheduled follow up, the patient has also been instructed to follow up on as needed basis.     No future appointments.       Opal Kaye MD

## (undated) DEVICE — SOL IRRI STRL WATER 1000ML

## (undated) DEVICE — FORCEP BIOPSY RADIAL JW 4

## (undated) DEVICE — COLLECTION SPECIMEN NEPTUNE

## (undated) DEVICE — CONTAINER SPECIMEN SCREW 4OZ

## (undated) DEVICE — ADAPTER DUAL NSL LUER M-M 7FT

## (undated) DEVICE — TUBING O2 FEMALE CONN 13FT

## (undated) DEVICE — KIT SURGICAL COLON .25 1.1OZ

## (undated) DEVICE — BLOCK BLOX BITE DENT RIM 54FR

## (undated) DEVICE — UNDERPAD PROTECT PLUS 17X24IN

## (undated) DEVICE — KIT CANIST SUCTION 1200CC

## (undated) DEVICE — TIP SUCTION YANKAUER